# Patient Record
Sex: FEMALE | Race: WHITE | NOT HISPANIC OR LATINO
[De-identification: names, ages, dates, MRNs, and addresses within clinical notes are randomized per-mention and may not be internally consistent; named-entity substitution may affect disease eponyms.]

---

## 2016-02-01 RX ORDER — METOPROLOL TARTRATE 50 MG
1 TABLET ORAL
Qty: 0 | Refills: 0 | DISCHARGE
Start: 2016-02-01

## 2017-03-06 ENCOUNTER — APPOINTMENT (OUTPATIENT)
Dept: DERMATOLOGY | Facility: CLINIC | Age: 82
End: 2017-03-06

## 2017-03-21 ENCOUNTER — APPOINTMENT (OUTPATIENT)
Dept: UROGYNECOLOGY | Facility: CLINIC | Age: 82
End: 2017-03-21

## 2017-03-21 ENCOUNTER — RESULT CHARGE (OUTPATIENT)
Age: 82
End: 2017-03-21

## 2017-03-21 VITALS
SYSTOLIC BLOOD PRESSURE: 173 MMHG | HEIGHT: 60 IN | WEIGHT: 147 LBS | BODY MASS INDEX: 28.86 KG/M2 | DIASTOLIC BLOOD PRESSURE: 72 MMHG

## 2017-06-12 ENCOUNTER — APPOINTMENT (OUTPATIENT)
Dept: UROGYNECOLOGY | Facility: CLINIC | Age: 82
End: 2017-06-12

## 2017-06-12 VITALS
HEIGHT: 62 IN | BODY MASS INDEX: 27.05 KG/M2 | SYSTOLIC BLOOD PRESSURE: 172 MMHG | WEIGHT: 147 LBS | DIASTOLIC BLOOD PRESSURE: 71 MMHG

## 2017-06-12 LAB
BILIRUB UR QL STRIP: NEGATIVE
CLARITY UR: CLEAR
COLLECTION METHOD: NORMAL
GLUCOSE UR-MCNC: NEGATIVE
HCG UR QL: 1 EU/DL
HGB UR QL STRIP.AUTO: NORMAL
KETONES UR-MCNC: NEGATIVE
LEUKOCYTE ESTERASE UR QL STRIP: NORMAL
NITRITE UR QL STRIP: NEGATIVE
PH UR STRIP: 6
PROT UR STRIP-MCNC: NEGATIVE
SP GR UR STRIP: 1.01

## 2017-09-06 ENCOUNTER — APPOINTMENT (OUTPATIENT)
Dept: DERMATOLOGY | Facility: CLINIC | Age: 82
End: 2017-09-06
Payer: MEDICARE

## 2017-09-06 PROCEDURE — 17003 DESTRUCT PREMALG LES 2-14: CPT

## 2017-09-06 PROCEDURE — 17000 DESTRUCT PREMALG LESION: CPT

## 2017-09-06 PROCEDURE — 99214 OFFICE O/P EST MOD 30 MIN: CPT | Mod: 25

## 2017-09-12 ENCOUNTER — APPOINTMENT (OUTPATIENT)
Dept: UROGYNECOLOGY | Facility: CLINIC | Age: 82
End: 2017-09-12
Payer: MEDICARE

## 2017-09-12 LAB
BILIRUB UR QL STRIP: NORMAL
CLARITY UR: CLEAR
COLLECTION METHOD: NORMAL
GLUCOSE UR-MCNC: NEGATIVE
HCG UR QL: 0.2 EU/DL
HGB UR QL STRIP.AUTO: NORMAL
KETONES UR-MCNC: NEGATIVE
LEUKOCYTE ESTERASE UR QL STRIP: NORMAL
NITRITE UR QL STRIP: NEGATIVE
PH UR STRIP: 6
PROT UR STRIP-MCNC: NEGATIVE
SP GR UR STRIP: 1.01

## 2017-09-12 PROCEDURE — 51798 US URINE CAPACITY MEASURE: CPT

## 2017-09-12 PROCEDURE — 81003 URINALYSIS AUTO W/O SCOPE: CPT | Mod: QW

## 2017-09-12 PROCEDURE — 99213 OFFICE O/P EST LOW 20 MIN: CPT

## 2017-09-19 ENCOUNTER — APPOINTMENT (OUTPATIENT)
Dept: CARDIOTHORACIC SURGERY | Facility: CLINIC | Age: 82
End: 2017-09-19

## 2017-12-12 ENCOUNTER — APPOINTMENT (OUTPATIENT)
Dept: UROGYNECOLOGY | Facility: CLINIC | Age: 82
End: 2017-12-12

## 2017-12-21 ENCOUNTER — APPOINTMENT (OUTPATIENT)
Dept: CARDIOLOGY | Facility: CLINIC | Age: 82
End: 2017-12-21

## 2018-02-28 ENCOUNTER — APPOINTMENT (OUTPATIENT)
Dept: UROGYNECOLOGY | Facility: CLINIC | Age: 83
End: 2018-02-28
Payer: COMMERCIAL

## 2018-02-28 VITALS
WEIGHT: 147 LBS | SYSTOLIC BLOOD PRESSURE: 137 MMHG | HEIGHT: 62 IN | BODY MASS INDEX: 27.05 KG/M2 | DIASTOLIC BLOOD PRESSURE: 77 MMHG

## 2018-02-28 PROCEDURE — 51798 US URINE CAPACITY MEASURE: CPT

## 2018-02-28 PROCEDURE — 99213 OFFICE O/P EST LOW 20 MIN: CPT

## 2018-03-06 ENCOUNTER — APPOINTMENT (OUTPATIENT)
Dept: DERMATOLOGY | Facility: CLINIC | Age: 83
End: 2018-03-06

## 2018-03-29 ENCOUNTER — APPOINTMENT (OUTPATIENT)
Dept: CARDIOLOGY | Facility: CLINIC | Age: 83
End: 2018-03-29
Payer: MEDICARE

## 2018-03-29 PROCEDURE — 93280 PM DEVICE PROGR EVAL DUAL: CPT

## 2018-03-30 ENCOUNTER — APPOINTMENT (OUTPATIENT)
Dept: CARDIOLOGY | Facility: CLINIC | Age: 83
End: 2018-03-30
Payer: MEDICARE

## 2018-03-30 VITALS
HEART RATE: 60 BPM | WEIGHT: 119 LBS | BODY MASS INDEX: 23.36 KG/M2 | HEIGHT: 60 IN | RESPIRATION RATE: 16 BRPM | DIASTOLIC BLOOD PRESSURE: 64 MMHG | SYSTOLIC BLOOD PRESSURE: 106 MMHG

## 2018-03-30 PROCEDURE — 99214 OFFICE O/P EST MOD 30 MIN: CPT

## 2018-03-30 PROCEDURE — 93000 ELECTROCARDIOGRAM COMPLETE: CPT

## 2018-03-30 RX ORDER — NYSTATIN 100000 1/G
100000 POWDER TOPICAL
Qty: 60 | Refills: 0 | Status: COMPLETED | COMMUNITY
Start: 2018-03-04

## 2018-03-30 RX ORDER — AMOXICILLIN 250 MG/1
250 CAPSULE ORAL
Qty: 30 | Refills: 0 | Status: COMPLETED | COMMUNITY
Start: 2017-10-30

## 2018-03-30 RX ORDER — ALBUTEROL SULFATE 90 UG/1
108 (90 BASE) AEROSOL, METERED RESPIRATORY (INHALATION)
Qty: 85 | Refills: 0 | Status: COMPLETED | COMMUNITY
Start: 2018-03-04

## 2018-03-30 RX ORDER — LEVETIRACETAM 100 MG/ML
100 SOLUTION ORAL
Qty: 300 | Refills: 0 | Status: COMPLETED | COMMUNITY
Start: 2018-03-04

## 2018-05-21 ENCOUNTER — APPOINTMENT (OUTPATIENT)
Dept: ELECTROPHYSIOLOGY | Facility: CLINIC | Age: 83
End: 2018-05-21

## 2018-05-23 ENCOUNTER — APPOINTMENT (OUTPATIENT)
Dept: UROGYNECOLOGY | Facility: CLINIC | Age: 83
End: 2018-05-23

## 2018-06-07 ENCOUNTER — RECORD ABSTRACTING (OUTPATIENT)
Age: 83
End: 2018-06-07

## 2018-06-07 RX ORDER — AZITHROMYCIN 250 MG/1
250 TABLET, FILM COATED ORAL
Qty: 6 | Refills: 0 | Status: COMPLETED | COMMUNITY
Start: 2018-04-03

## 2018-06-07 RX ORDER — LEVETIRACETAM 500 MG/1
500 TABLET, FILM COATED ORAL
Qty: 60 | Refills: 0 | Status: COMPLETED | COMMUNITY
Start: 2018-04-26

## 2018-06-07 RX ORDER — LOPERAMIDE HYDROCHLORIDE 2 MG/1
2 CAPSULE ORAL
Qty: 2 | Refills: 0 | Status: COMPLETED | COMMUNITY
Start: 2018-04-08

## 2018-06-07 RX ORDER — ATORVASTATIN CALCIUM 20 MG/1
20 TABLET, FILM COATED ORAL
Qty: 90 | Refills: 0 | Status: COMPLETED | COMMUNITY
Start: 2018-05-21

## 2018-06-07 RX ORDER — MIRTAZAPINE 7.5 MG/1
7.5 TABLET, FILM COATED ORAL
Qty: 30 | Refills: 0 | Status: COMPLETED | COMMUNITY
Start: 2018-04-27

## 2018-06-07 RX ORDER — ONDANSETRON 8 MG/1
8 TABLET ORAL
Qty: 3 | Refills: 0 | Status: COMPLETED | COMMUNITY
Start: 2018-04-08

## 2018-06-08 ENCOUNTER — APPOINTMENT (OUTPATIENT)
Dept: CARDIOLOGY | Facility: CLINIC | Age: 83
End: 2018-06-08
Payer: MEDICARE

## 2018-06-08 ENCOUNTER — APPOINTMENT (OUTPATIENT)
Dept: DERMATOLOGY | Facility: CLINIC | Age: 83
End: 2018-06-08

## 2018-06-08 VITALS
HEART RATE: 78 BPM | DIASTOLIC BLOOD PRESSURE: 82 MMHG | BODY MASS INDEX: 23.56 KG/M2 | HEIGHT: 60 IN | WEIGHT: 120 LBS | RESPIRATION RATE: 16 BRPM | SYSTOLIC BLOOD PRESSURE: 149 MMHG

## 2018-06-08 PROCEDURE — 93000 ELECTROCARDIOGRAM COMPLETE: CPT

## 2018-06-08 PROCEDURE — 99214 OFFICE O/P EST MOD 30 MIN: CPT

## 2018-09-28 ENCOUNTER — APPOINTMENT (OUTPATIENT)
Dept: CARDIOLOGY | Facility: CLINIC | Age: 83
End: 2018-09-28
Payer: MEDICARE

## 2018-09-28 VITALS
BODY MASS INDEX: 25.13 KG/M2 | DIASTOLIC BLOOD PRESSURE: 74 MMHG | RESPIRATION RATE: 16 BRPM | SYSTOLIC BLOOD PRESSURE: 120 MMHG | HEART RATE: 60 BPM | WEIGHT: 128 LBS | HEIGHT: 60 IN

## 2018-09-28 DIAGNOSIS — Z86.39 PERSONAL HISTORY OF OTHER ENDOCRINE, NUTRITIONAL AND METABOLIC DISEASE: ICD-10-CM

## 2018-09-28 DIAGNOSIS — Z86.79 PERSONAL HISTORY OF OTHER DISEASES OF THE CIRCULATORY SYSTEM: ICD-10-CM

## 2018-09-28 PROCEDURE — 99214 OFFICE O/P EST MOD 30 MIN: CPT

## 2018-09-28 PROCEDURE — 93280 PM DEVICE PROGR EVAL DUAL: CPT

## 2018-09-28 PROCEDURE — 93000 ELECTROCARDIOGRAM COMPLETE: CPT

## 2018-09-28 RX ORDER — MIRTAZAPINE 15 MG/1
15 TABLET, FILM COATED ORAL
Refills: 0 | Status: COMPLETED | COMMUNITY

## 2018-09-28 RX ORDER — LEVOTHYROXINE SODIUM 0.17 MG/1
TABLET ORAL DAILY
Refills: 0 | Status: DISCONTINUED | COMMUNITY
Start: 2017-04-17 | End: 2018-09-28

## 2018-09-28 RX ORDER — CHROMIUM 200 MCG
1000 TABLET ORAL DAILY
Refills: 0 | Status: ACTIVE | COMMUNITY

## 2018-09-28 RX ORDER — ASPIRIN 81 MG
81 TABLET, DELAYED RELEASE (ENTERIC COATED) ORAL DAILY
Refills: 3 | Status: ACTIVE | COMMUNITY

## 2018-09-28 RX ORDER — LEVETIRACETAM 100 MG/ML
100 SOLUTION ORAL
Refills: 0 | Status: COMPLETED | COMMUNITY

## 2018-10-05 ENCOUNTER — OTHER (OUTPATIENT)
Age: 83
End: 2018-10-05

## 2018-10-05 ENCOUNTER — APPOINTMENT (OUTPATIENT)
Dept: UROGYNECOLOGY | Facility: CLINIC | Age: 83
End: 2018-10-05

## 2018-10-08 ENCOUNTER — APPOINTMENT (OUTPATIENT)
Dept: UROGYNECOLOGY | Facility: CLINIC | Age: 83
End: 2018-10-08
Payer: MEDICARE

## 2018-10-08 ENCOUNTER — RESULT REVIEW (OUTPATIENT)
Age: 83
End: 2018-10-08

## 2018-10-08 VITALS
HEIGHT: 60 IN | DIASTOLIC BLOOD PRESSURE: 78 MMHG | BODY MASS INDEX: 25.13 KG/M2 | SYSTOLIC BLOOD PRESSURE: 148 MMHG | WEIGHT: 128 LBS

## 2018-10-08 LAB — BACTERIA UR CULT: NORMAL

## 2018-10-08 PROCEDURE — 99213 OFFICE O/P EST LOW 20 MIN: CPT | Mod: 25

## 2018-10-08 PROCEDURE — 51701 INSERT BLADDER CATHETER: CPT

## 2018-10-09 ENCOUNTER — RESULT REVIEW (OUTPATIENT)
Age: 83
End: 2018-10-09

## 2018-10-09 LAB
APPEARANCE: CLEAR
BACTERIA: NEGATIVE
BILIRUBIN URINE: NEGATIVE
BLOOD URINE: NEGATIVE
COLOR: YELLOW
GLUCOSE QUALITATIVE U: NEGATIVE MG/DL
HYALINE CASTS: 1 /LPF
KETONES URINE: NEGATIVE
LEUKOCYTE ESTERASE URINE: NEGATIVE
MICROSCOPIC-UA: NORMAL
NITRITE URINE: NEGATIVE
PH URINE: 5.5
PROTEIN URINE: NEGATIVE MG/DL
RED BLOOD CELLS URINE: 2 /HPF
SPECIFIC GRAVITY URINE: 1.02
SQUAMOUS EPITHELIAL CELLS: 0 /HPF
UROBILINOGEN URINE: NEGATIVE MG/DL
WHITE BLOOD CELLS URINE: 0 /HPF

## 2018-10-10 ENCOUNTER — RESULT REVIEW (OUTPATIENT)
Age: 83
End: 2018-10-10

## 2018-10-10 LAB — BACTERIA UR CULT: NORMAL

## 2018-10-18 LAB
APPEARANCE: CLEAR
BACTERIA: NEGATIVE
BILIRUBIN URINE: NEGATIVE
BLOOD URINE: ABNORMAL
COLOR: YELLOW
GLUCOSE QUALITATIVE U: NEGATIVE MG/DL
HYALINE CASTS: 0 /LPF
KETONES URINE: NEGATIVE
LEUKOCYTE ESTERASE URINE: ABNORMAL
MICROSCOPIC-UA: NORMAL
NITRITE URINE: NEGATIVE
PH URINE: 7.5
PROTEIN URINE: NEGATIVE MG/DL
RED BLOOD CELLS URINE: 3 /HPF
SPECIFIC GRAVITY URINE: 1.01
SQUAMOUS EPITHELIAL CELLS: 1 /HPF
UROBILINOGEN URINE: NEGATIVE MG/DL
WHITE BLOOD CELLS URINE: 133 /HPF

## 2018-12-03 ENCOUNTER — RX RENEWAL (OUTPATIENT)
Age: 83
End: 2018-12-03

## 2019-01-01 ENCOUNTER — RESULT REVIEW (OUTPATIENT)
Age: 84
End: 2019-01-01

## 2019-01-01 ENCOUNTER — RX RENEWAL (OUTPATIENT)
Age: 84
End: 2019-01-01

## 2019-01-01 ENCOUNTER — APPOINTMENT (OUTPATIENT)
Dept: UROGYNECOLOGY | Facility: CLINIC | Age: 84
End: 2019-01-01

## 2019-01-01 ENCOUNTER — APPOINTMENT (OUTPATIENT)
Dept: CARDIOLOGY | Facility: CLINIC | Age: 84
End: 2019-01-01
Payer: MEDICARE

## 2019-01-01 ENCOUNTER — APPOINTMENT (OUTPATIENT)
Dept: UROGYNECOLOGY | Facility: CLINIC | Age: 84
End: 2019-01-01
Payer: MEDICARE

## 2019-01-01 ENCOUNTER — APPOINTMENT (OUTPATIENT)
Dept: DERMATOLOGY | Facility: CLINIC | Age: 84
End: 2019-01-01
Payer: MEDICARE

## 2019-01-01 ENCOUNTER — NON-APPOINTMENT (OUTPATIENT)
Age: 84
End: 2019-01-01

## 2019-01-01 ENCOUNTER — INPATIENT (INPATIENT)
Facility: HOSPITAL | Age: 84
LOS: 13 days | Discharge: HOME | DRG: 291 | End: 2019-12-24
Attending: INTERNAL MEDICINE | Admitting: HOSPITALIST
Payer: MEDICARE

## 2019-01-01 ENCOUNTER — TRANSCRIPTION ENCOUNTER (OUTPATIENT)
Age: 84
End: 2019-01-01

## 2019-01-01 ENCOUNTER — APPOINTMENT (OUTPATIENT)
Dept: CARDIOLOGY | Facility: CLINIC | Age: 84
End: 2019-01-01

## 2019-01-01 ENCOUNTER — RESULT CHARGE (OUTPATIENT)
Age: 84
End: 2019-01-01

## 2019-01-01 ENCOUNTER — MEDICATION RENEWAL (OUTPATIENT)
Age: 84
End: 2019-01-01

## 2019-01-01 VITALS
WEIGHT: 150 LBS | RESPIRATION RATE: 12 BRPM | HEIGHT: 60 IN | SYSTOLIC BLOOD PRESSURE: 133 MMHG | HEART RATE: 92 BPM | BODY MASS INDEX: 29.45 KG/M2 | DIASTOLIC BLOOD PRESSURE: 80 MMHG

## 2019-01-01 VITALS
BODY MASS INDEX: 30.43 KG/M2 | HEIGHT: 60 IN | WEIGHT: 155 LBS | DIASTOLIC BLOOD PRESSURE: 75 MMHG | SYSTOLIC BLOOD PRESSURE: 142 MMHG

## 2019-01-01 VITALS
DIASTOLIC BLOOD PRESSURE: 85 MMHG | SYSTOLIC BLOOD PRESSURE: 118 MMHG | TEMPERATURE: 98 F | HEART RATE: 89 BPM | RESPIRATION RATE: 18 BRPM | OXYGEN SATURATION: 93 %

## 2019-01-01 VITALS
OXYGEN SATURATION: 94 % | RESPIRATION RATE: 16 BRPM | TEMPERATURE: 98 F | HEART RATE: 80 BPM | DIASTOLIC BLOOD PRESSURE: 68 MMHG | SYSTOLIC BLOOD PRESSURE: 120 MMHG

## 2019-01-01 VITALS
WEIGHT: 144 LBS | HEIGHT: 60 IN | DIASTOLIC BLOOD PRESSURE: 75 MMHG | SYSTOLIC BLOOD PRESSURE: 146 MMHG | BODY MASS INDEX: 28.27 KG/M2

## 2019-01-01 DIAGNOSIS — Z95.0 PRESENCE OF CARDIAC PACEMAKER: Chronic | ICD-10-CM

## 2019-01-01 DIAGNOSIS — R53.82 CHRONIC FATIGUE, UNSPECIFIED: ICD-10-CM

## 2019-01-01 DIAGNOSIS — E78.5 HYPERLIPIDEMIA, UNSPECIFIED: ICD-10-CM

## 2019-01-01 DIAGNOSIS — N39.0 URINARY TRACT INFECTION, SITE NOT SPECIFIED: ICD-10-CM

## 2019-01-01 DIAGNOSIS — I25.10 ATHEROSCLEROTIC HEART DISEASE OF NATIVE CORONARY ARTERY W/OUT ANGINA PECTORIS: ICD-10-CM

## 2019-01-01 DIAGNOSIS — I65.29 OCCLUSION AND STENOSIS OF UNSPECIFIED CAROTID ARTERY: ICD-10-CM

## 2019-01-01 DIAGNOSIS — I50.9 HEART FAILURE, UNSPECIFIED: ICD-10-CM

## 2019-01-01 DIAGNOSIS — I48.91 UNSPECIFIED ATRIAL FIBRILLATION: ICD-10-CM

## 2019-01-01 DIAGNOSIS — R39.9 UNSPECIFIED SYMPTOMS AND SIGNS INVOLVING THE GENITOURINARY SYSTEM: ICD-10-CM

## 2019-01-01 DIAGNOSIS — I35.0 NONRHEUMATIC AORTIC (VALVE) STENOSIS: ICD-10-CM

## 2019-01-01 DIAGNOSIS — H26.9 UNSPECIFIED CATARACT: Chronic | ICD-10-CM

## 2019-01-01 DIAGNOSIS — Z29.9 ENCOUNTER FOR PROPHYLACTIC MEASURES, UNSPECIFIED: ICD-10-CM

## 2019-01-01 DIAGNOSIS — Z95.2 PRESENCE OF PROSTHETIC HEART VALVE: Chronic | ICD-10-CM

## 2019-01-01 LAB
-  AMPICILLIN/SULBACTAM: SIGNIFICANT CHANGE UP
-  CEFAZOLIN: SIGNIFICANT CHANGE UP
-  DAPTOMYCIN: SIGNIFICANT CHANGE UP
-  GENTAMICIN: SIGNIFICANT CHANGE UP
-  LINEZOLID: SIGNIFICANT CHANGE UP
-  OXACILLIN: SIGNIFICANT CHANGE UP
-  PENICILLIN: SIGNIFICANT CHANGE UP
-  RIFAMPIN: SIGNIFICANT CHANGE UP
-  TETRACYCLINE: SIGNIFICANT CHANGE UP
-  TRIMETHOPRIM/SULFAMETHOXAZOLE: SIGNIFICANT CHANGE UP
-  VANCOMYCIN: SIGNIFICANT CHANGE UP
ALBUMIN SERPL ELPH-MCNC: 2.9 G/DL — LOW (ref 3.3–5.2)
ALBUMIN SERPL ELPH-MCNC: 3 G/DL — LOW (ref 3.3–5.2)
ALP SERPL-CCNC: 85 U/L — SIGNIFICANT CHANGE UP (ref 40–120)
ALP SERPL-CCNC: 90 U/L — SIGNIFICANT CHANGE UP (ref 40–120)
ALT FLD-CCNC: 20 U/L — SIGNIFICANT CHANGE UP
ALT FLD-CCNC: 25 U/L — SIGNIFICANT CHANGE UP
ANION GAP SERPL CALC-SCNC: 10 MMOL/L — SIGNIFICANT CHANGE UP (ref 5–17)
ANION GAP SERPL CALC-SCNC: 12 MMOL/L — SIGNIFICANT CHANGE UP (ref 5–17)
ANION GAP SERPL CALC-SCNC: 13 MMOL/L — SIGNIFICANT CHANGE UP (ref 5–17)
ANION GAP SERPL CALC-SCNC: 14 MMOL/L — SIGNIFICANT CHANGE UP (ref 5–17)
ANION GAP SERPL CALC-SCNC: 15 MMOL/L — SIGNIFICANT CHANGE UP (ref 5–17)
ANION GAP SERPL CALC-SCNC: 17 MMOL/L — SIGNIFICANT CHANGE UP (ref 5–17)
ANION GAP SERPL CALC-SCNC: 17 MMOL/L — SIGNIFICANT CHANGE UP (ref 5–17)
ANION GAP SERPL CALC-SCNC: 18 MMOL/L — HIGH (ref 5–17)
ANION GAP SERPL CALC-SCNC: 18 MMOL/L — HIGH (ref 5–17)
APPEARANCE UR: ABNORMAL
APPEARANCE: CLEAR
APPEARANCE: CLEAR
APTT BLD: 29 SEC — SIGNIFICANT CHANGE UP (ref 27.5–36.3)
APTT BLD: 30.2 SEC — SIGNIFICANT CHANGE UP (ref 27.5–36.3)
AST SERPL-CCNC: 30 U/L — SIGNIFICANT CHANGE UP
AST SERPL-CCNC: 41 U/L — HIGH
BACTERIA # UR AUTO: ABNORMAL
BACTERIA UR CULT: NORMAL
BACTERIA UR CULT: NORMAL
BACTERIA: NEGATIVE
BACTERIA: NEGATIVE
BASOPHILS # BLD AUTO: 0.01 K/UL — SIGNIFICANT CHANGE UP (ref 0–0.2)
BASOPHILS # BLD AUTO: 0.03 K/UL — SIGNIFICANT CHANGE UP (ref 0–0.2)
BASOPHILS # BLD AUTO: 0.04 K/UL — SIGNIFICANT CHANGE UP (ref 0–0.2)
BASOPHILS # BLD AUTO: 0.05 K/UL — SIGNIFICANT CHANGE UP (ref 0–0.2)
BASOPHILS # BLD AUTO: 0.06 K/UL — SIGNIFICANT CHANGE UP (ref 0–0.2)
BASOPHILS # BLD AUTO: 0.2 K/UL — SIGNIFICANT CHANGE UP (ref 0–0.2)
BASOPHILS NFR BLD AUTO: 0.1 % — SIGNIFICANT CHANGE UP (ref 0–2)
BASOPHILS NFR BLD AUTO: 0.3 % — SIGNIFICANT CHANGE UP (ref 0–2)
BASOPHILS NFR BLD AUTO: 0.4 % — SIGNIFICANT CHANGE UP (ref 0–2)
BASOPHILS NFR BLD AUTO: 0.5 % — SIGNIFICANT CHANGE UP (ref 0–2)
BASOPHILS NFR BLD AUTO: 0.6 % — SIGNIFICANT CHANGE UP (ref 0–2)
BASOPHILS NFR BLD AUTO: 1.7 % — SIGNIFICANT CHANGE UP (ref 0–2)
BILIRUB SERPL-MCNC: 0.7 MG/DL — SIGNIFICANT CHANGE UP (ref 0.4–2)
BILIRUB SERPL-MCNC: 0.8 MG/DL — SIGNIFICANT CHANGE UP (ref 0.4–2)
BILIRUB UR QL STRIP: NEGATIVE
BILIRUB UR-MCNC: NEGATIVE — SIGNIFICANT CHANGE UP
BILIRUBIN URINE: NEGATIVE
BILIRUBIN URINE: NEGATIVE
BLOOD URINE: ABNORMAL
BLOOD URINE: NEGATIVE
BUN SERPL-MCNC: 16 MG/DL — SIGNIFICANT CHANGE UP (ref 8–20)
BUN SERPL-MCNC: 17 MG/DL — SIGNIFICANT CHANGE UP (ref 8–20)
BUN SERPL-MCNC: 22 MG/DL — HIGH (ref 8–20)
BUN SERPL-MCNC: 23 MG/DL — HIGH (ref 8–20)
BUN SERPL-MCNC: 24 MG/DL — HIGH (ref 8–20)
BUN SERPL-MCNC: 26 MG/DL — HIGH (ref 8–20)
BUN SERPL-MCNC: 27 MG/DL — HIGH (ref 8–20)
BUN SERPL-MCNC: 37 MG/DL — HIGH (ref 8–20)
CALCIUM SERPL-MCNC: 8.8 MG/DL — SIGNIFICANT CHANGE UP (ref 8.6–10.2)
CALCIUM SERPL-MCNC: 8.9 MG/DL — SIGNIFICANT CHANGE UP (ref 8.6–10.2)
CALCIUM SERPL-MCNC: 9 MG/DL — SIGNIFICANT CHANGE UP (ref 8.6–10.2)
CALCIUM SERPL-MCNC: 9.1 MG/DL — SIGNIFICANT CHANGE UP (ref 8.6–10.2)
CALCIUM SERPL-MCNC: 9.2 MG/DL — SIGNIFICANT CHANGE UP (ref 8.6–10.2)
CALCIUM SERPL-MCNC: 9.2 MG/DL — SIGNIFICANT CHANGE UP (ref 8.6–10.2)
CALCIUM SERPL-MCNC: 9.3 MG/DL — SIGNIFICANT CHANGE UP (ref 8.6–10.2)
CALCIUM SERPL-MCNC: 9.4 MG/DL — SIGNIFICANT CHANGE UP (ref 8.6–10.2)
CALCIUM SERPL-MCNC: 9.4 MG/DL — SIGNIFICANT CHANGE UP (ref 8.6–10.2)
CHLORIDE SERPL-SCNC: 89 MMOL/L — LOW (ref 98–107)
CHLORIDE SERPL-SCNC: 90 MMOL/L — LOW (ref 98–107)
CHLORIDE SERPL-SCNC: 91 MMOL/L — LOW (ref 98–107)
CHLORIDE SERPL-SCNC: 91 MMOL/L — LOW (ref 98–107)
CHLORIDE SERPL-SCNC: 92 MMOL/L — LOW (ref 98–107)
CHLORIDE SERPL-SCNC: 92 MMOL/L — LOW (ref 98–107)
CHLORIDE SERPL-SCNC: 95 MMOL/L — LOW (ref 98–107)
CHLORIDE SERPL-SCNC: 95 MMOL/L — LOW (ref 98–107)
CHLORIDE SERPL-SCNC: 96 MMOL/L — LOW (ref 98–107)
CHLORIDE SERPL-SCNC: 98 MMOL/L — SIGNIFICANT CHANGE UP (ref 98–107)
CLARITY UR: CLEAR
CO2 SERPL-SCNC: 23 MMOL/L — SIGNIFICANT CHANGE UP (ref 22–29)
CO2 SERPL-SCNC: 26 MMOL/L — SIGNIFICANT CHANGE UP (ref 22–29)
CO2 SERPL-SCNC: 27 MMOL/L — SIGNIFICANT CHANGE UP (ref 22–29)
CO2 SERPL-SCNC: 28 MMOL/L — SIGNIFICANT CHANGE UP (ref 22–29)
CO2 SERPL-SCNC: 29 MMOL/L — SIGNIFICANT CHANGE UP (ref 22–29)
CO2 SERPL-SCNC: 30 MMOL/L — HIGH (ref 22–29)
CO2 SERPL-SCNC: 31 MMOL/L — HIGH (ref 22–29)
CO2 SERPL-SCNC: 33 MMOL/L — HIGH (ref 22–29)
COLLECTION METHOD: NORMAL
COLOR SPEC: YELLOW — SIGNIFICANT CHANGE UP
COLOR: YELLOW
COLOR: YELLOW
CREAT SERPL-MCNC: 0.76 MG/DL — SIGNIFICANT CHANGE UP (ref 0.5–1.3)
CREAT SERPL-MCNC: 0.78 MG/DL — SIGNIFICANT CHANGE UP (ref 0.5–1.3)
CREAT SERPL-MCNC: 0.82 MG/DL — SIGNIFICANT CHANGE UP (ref 0.5–1.3)
CREAT SERPL-MCNC: 0.84 MG/DL — SIGNIFICANT CHANGE UP (ref 0.5–1.3)
CREAT SERPL-MCNC: 0.88 MG/DL — SIGNIFICANT CHANGE UP (ref 0.5–1.3)
CREAT SERPL-MCNC: 0.89 MG/DL — SIGNIFICANT CHANGE UP (ref 0.5–1.3)
CREAT SERPL-MCNC: 0.91 MG/DL — SIGNIFICANT CHANGE UP (ref 0.5–1.3)
CREAT SERPL-MCNC: 0.96 MG/DL — SIGNIFICANT CHANGE UP (ref 0.5–1.3)
CREAT SERPL-MCNC: 0.98 MG/DL — SIGNIFICANT CHANGE UP (ref 0.5–1.3)
CREAT SERPL-MCNC: 1.03 MG/DL — SIGNIFICANT CHANGE UP (ref 0.5–1.3)
CULTURE RESULTS: SIGNIFICANT CHANGE UP
DIFF PNL FLD: ABNORMAL
DIGOXIN SERPL-MCNC: 1.8 NG/ML — SIGNIFICANT CHANGE UP (ref 0.8–2)
EOSINOPHIL # BLD AUTO: 0 K/UL — SIGNIFICANT CHANGE UP (ref 0–0.5)
EOSINOPHIL # BLD AUTO: 0.01 K/UL — SIGNIFICANT CHANGE UP (ref 0–0.5)
EOSINOPHIL # BLD AUTO: 0.16 K/UL — SIGNIFICANT CHANGE UP (ref 0–0.5)
EOSINOPHIL # BLD AUTO: 0.2 K/UL — SIGNIFICANT CHANGE UP (ref 0–0.5)
EOSINOPHIL # BLD AUTO: 0.32 K/UL — SIGNIFICANT CHANGE UP (ref 0–0.5)
EOSINOPHIL # BLD AUTO: 0.6 K/UL — HIGH (ref 0–0.5)
EOSINOPHIL NFR BLD AUTO: 0 % — SIGNIFICANT CHANGE UP (ref 0–6)
EOSINOPHIL NFR BLD AUTO: 0.1 % — SIGNIFICANT CHANGE UP (ref 0–6)
EOSINOPHIL NFR BLD AUTO: 1.6 % — SIGNIFICANT CHANGE UP (ref 0–6)
EOSINOPHIL NFR BLD AUTO: 1.8 % — SIGNIFICANT CHANGE UP (ref 0–6)
EOSINOPHIL NFR BLD AUTO: 3.4 % — SIGNIFICANT CHANGE UP (ref 0–6)
EOSINOPHIL NFR BLD AUTO: 5.2 % — SIGNIFICANT CHANGE UP (ref 0–6)
EPI CELLS # UR: SIGNIFICANT CHANGE UP
GIANT PLATELETS BLD QL SMEAR: PRESENT — SIGNIFICANT CHANGE UP
GLUCOSE QUALITATIVE U: NEGATIVE
GLUCOSE QUALITATIVE U: NEGATIVE
GLUCOSE SERPL-MCNC: 110 MG/DL — SIGNIFICANT CHANGE UP (ref 70–115)
GLUCOSE SERPL-MCNC: 121 MG/DL — HIGH (ref 70–115)
GLUCOSE SERPL-MCNC: 156 MG/DL — HIGH (ref 70–115)
GLUCOSE SERPL-MCNC: 81 MG/DL — SIGNIFICANT CHANGE UP (ref 70–115)
GLUCOSE SERPL-MCNC: 86 MG/DL — SIGNIFICANT CHANGE UP (ref 70–115)
GLUCOSE SERPL-MCNC: 90 MG/DL — SIGNIFICANT CHANGE UP (ref 70–115)
GLUCOSE SERPL-MCNC: 93 MG/DL — SIGNIFICANT CHANGE UP (ref 70–115)
GLUCOSE SERPL-MCNC: 95 MG/DL — SIGNIFICANT CHANGE UP (ref 70–115)
GLUCOSE SERPL-MCNC: 96 MG/DL — SIGNIFICANT CHANGE UP (ref 70–115)
GLUCOSE SERPL-MCNC: 97 MG/DL — SIGNIFICANT CHANGE UP (ref 70–115)
GLUCOSE UR QL: NEGATIVE — SIGNIFICANT CHANGE UP
GLUCOSE UR-MCNC: NEGATIVE
HCG UR QL: 1 EU/DL
HCT VFR BLD CALC: 42.2 % — SIGNIFICANT CHANGE UP (ref 34.5–45)
HCT VFR BLD CALC: 42.3 % — SIGNIFICANT CHANGE UP (ref 34.5–45)
HCT VFR BLD CALC: 42.5 % — SIGNIFICANT CHANGE UP (ref 34.5–45)
HCT VFR BLD CALC: 43.1 % — SIGNIFICANT CHANGE UP (ref 34.5–45)
HCT VFR BLD CALC: 45.9 % — HIGH (ref 34.5–45)
HCT VFR BLD CALC: 46.6 % — HIGH (ref 34.5–45)
HCT VFR BLD CALC: 48.4 % — HIGH (ref 34.5–45)
HCT VFR BLD CALC: 48.8 % — HIGH (ref 34.5–45)
HCT VFR BLD CALC: 51 % — HIGH (ref 34.5–45)
HGB BLD-MCNC: 13.6 G/DL — SIGNIFICANT CHANGE UP (ref 11.5–15.5)
HGB BLD-MCNC: 13.7 G/DL — SIGNIFICANT CHANGE UP (ref 11.5–15.5)
HGB BLD-MCNC: 13.7 G/DL — SIGNIFICANT CHANGE UP (ref 11.5–15.5)
HGB BLD-MCNC: 13.9 G/DL — SIGNIFICANT CHANGE UP (ref 11.5–15.5)
HGB BLD-MCNC: 14.9 G/DL — SIGNIFICANT CHANGE UP (ref 11.5–15.5)
HGB BLD-MCNC: 15.1 G/DL — SIGNIFICANT CHANGE UP (ref 11.5–15.5)
HGB BLD-MCNC: 15.5 G/DL — SIGNIFICANT CHANGE UP (ref 11.5–15.5)
HGB BLD-MCNC: 15.7 G/DL — HIGH (ref 11.5–15.5)
HGB BLD-MCNC: 16.5 G/DL — HIGH (ref 11.5–15.5)
HGB UR QL STRIP.AUTO: 1
HYALINE CASTS: 0 /LPF
HYALINE CASTS: 2 /LPF
IMM GRANULOCYTES NFR BLD AUTO: 0.6 % — SIGNIFICANT CHANGE UP (ref 0–1.5)
IMM GRANULOCYTES NFR BLD AUTO: 0.7 % — SIGNIFICANT CHANGE UP (ref 0–1.5)
IMM GRANULOCYTES NFR BLD AUTO: 0.7 % — SIGNIFICANT CHANGE UP (ref 0–1.5)
IMM GRANULOCYTES NFR BLD AUTO: 1.1 % — SIGNIFICANT CHANGE UP (ref 0–1.5)
IMM GRANULOCYTES NFR BLD AUTO: 2.5 % — HIGH (ref 0–1.5)
INR BLD: 1.27 RATIO — HIGH (ref 0.88–1.16)
INR BLD: 1.3 RATIO — HIGH (ref 0.88–1.16)
KETONES UR-MCNC: NEGATIVE
KETONES UR-MCNC: NEGATIVE — SIGNIFICANT CHANGE UP
KETONES URINE: NEGATIVE
KETONES URINE: NEGATIVE
LACTATE BLDV-MCNC: 2.2 MMOL/L — HIGH (ref 0.5–2)
LACTATE SERPL-SCNC: 1.5 MMOL/L — SIGNIFICANT CHANGE UP (ref 0.5–2)
LEUKOCYTE ESTERASE UR QL STRIP: NEGATIVE
LEUKOCYTE ESTERASE UR-ACNC: ABNORMAL
LEUKOCYTE ESTERASE URINE: NEGATIVE
LEUKOCYTE ESTERASE URINE: NEGATIVE
LIDOCAIN IGE QN: 9 U/L — LOW (ref 22–51)
LYMPHOCYTES # BLD AUTO: 0.63 K/UL — LOW (ref 1–3.3)
LYMPHOCYTES # BLD AUTO: 0.9 K/UL — LOW (ref 1–3.3)
LYMPHOCYTES # BLD AUTO: 0.92 K/UL — LOW (ref 1–3.3)
LYMPHOCYTES # BLD AUTO: 1.22 K/UL — SIGNIFICANT CHANGE UP (ref 1–3.3)
LYMPHOCYTES # BLD AUTO: 1.84 K/UL — SIGNIFICANT CHANGE UP (ref 1–3.3)
LYMPHOCYTES # BLD AUTO: 11.2 % — LOW (ref 13–44)
LYMPHOCYTES # BLD AUTO: 19.4 % — SIGNIFICANT CHANGE UP (ref 13–44)
LYMPHOCYTES # BLD AUTO: 2.14 K/UL — SIGNIFICANT CHANGE UP (ref 1–3.3)
LYMPHOCYTES # BLD AUTO: 22.1 % — SIGNIFICANT CHANGE UP (ref 13–44)
LYMPHOCYTES # BLD AUTO: 5.4 % — LOW (ref 13–44)
LYMPHOCYTES # BLD AUTO: 7.7 % — LOW (ref 13–44)
LYMPHOCYTES # BLD AUTO: 7.8 % — LOW (ref 13–44)
MAGNESIUM SERPL-MCNC: 1.7 MG/DL — SIGNIFICANT CHANGE UP (ref 1.6–2.6)
MAGNESIUM SERPL-MCNC: 1.8 MG/DL — SIGNIFICANT CHANGE UP (ref 1.6–2.6)
MAGNESIUM SERPL-MCNC: 1.8 MG/DL — SIGNIFICANT CHANGE UP (ref 1.6–2.6)
MAGNESIUM SERPL-MCNC: 1.9 MG/DL — SIGNIFICANT CHANGE UP (ref 1.6–2.6)
MAGNESIUM SERPL-MCNC: 1.9 MG/DL — SIGNIFICANT CHANGE UP (ref 1.8–2.6)
MAGNESIUM SERPL-MCNC: 2 MG/DL — SIGNIFICANT CHANGE UP (ref 1.8–2.6)
MANUAL SMEAR VERIFICATION: SIGNIFICANT CHANGE UP
MCHC RBC-ENTMCNC: 28.9 PG — SIGNIFICANT CHANGE UP (ref 27–34)
MCHC RBC-ENTMCNC: 29.1 PG — SIGNIFICANT CHANGE UP (ref 27–34)
MCHC RBC-ENTMCNC: 29.4 PG — SIGNIFICANT CHANGE UP (ref 27–34)
MCHC RBC-ENTMCNC: 29.5 PG — SIGNIFICANT CHANGE UP (ref 27–34)
MCHC RBC-ENTMCNC: 29.7 PG — SIGNIFICANT CHANGE UP (ref 27–34)
MCHC RBC-ENTMCNC: 29.9 PG — SIGNIFICANT CHANGE UP (ref 27–34)
MCHC RBC-ENTMCNC: 32 GM/DL — SIGNIFICANT CHANGE UP (ref 32–36)
MCHC RBC-ENTMCNC: 32 GM/DL — SIGNIFICANT CHANGE UP (ref 32–36)
MCHC RBC-ENTMCNC: 32.2 GM/DL — SIGNIFICANT CHANGE UP (ref 32–36)
MCHC RBC-ENTMCNC: 32.3 GM/DL — SIGNIFICANT CHANGE UP (ref 32–36)
MCHC RBC-ENTMCNC: 32.4 GM/DL — SIGNIFICANT CHANGE UP (ref 32–36)
MCHC RBC-ENTMCNC: 32.5 GM/DL — SIGNIFICANT CHANGE UP (ref 32–36)
MCHC RBC-ENTMCNC: 32.9 GM/DL — SIGNIFICANT CHANGE UP (ref 32–36)
MCV RBC AUTO: 90.3 FL — SIGNIFICANT CHANGE UP (ref 80–100)
MCV RBC AUTO: 90.5 FL — SIGNIFICANT CHANGE UP (ref 80–100)
MCV RBC AUTO: 90.8 FL — SIGNIFICANT CHANGE UP (ref 80–100)
MCV RBC AUTO: 90.9 FL — SIGNIFICANT CHANGE UP (ref 80–100)
MCV RBC AUTO: 91.2 FL — SIGNIFICANT CHANGE UP (ref 80–100)
MCV RBC AUTO: 91.9 FL — SIGNIFICANT CHANGE UP (ref 80–100)
MCV RBC AUTO: 92.1 FL — SIGNIFICANT CHANGE UP (ref 80–100)
MCV RBC AUTO: 92.4 FL — SIGNIFICANT CHANGE UP (ref 80–100)
MCV RBC AUTO: 92.7 FL — SIGNIFICANT CHANGE UP (ref 80–100)
METAMYELOCYTES # FLD: 0.9 % — HIGH (ref 0–0)
METHOD TYPE: SIGNIFICANT CHANGE UP
MICROSCOPIC-UA: NORMAL
MICROSCOPIC-UA: NORMAL
MONOCYTES # BLD AUTO: 0.27 K/UL — SIGNIFICANT CHANGE UP (ref 0–0.9)
MONOCYTES # BLD AUTO: 0.71 K/UL — SIGNIFICANT CHANGE UP (ref 0–0.9)
MONOCYTES # BLD AUTO: 0.88 K/UL — SIGNIFICANT CHANGE UP (ref 0–0.9)
MONOCYTES # BLD AUTO: 1 K/UL — HIGH (ref 0–0.9)
MONOCYTES # BLD AUTO: 1.17 K/UL — HIGH (ref 0–0.9)
MONOCYTES # BLD AUTO: 1.27 K/UL — HIGH (ref 0–0.9)
MONOCYTES NFR BLD AUTO: 12.1 % — SIGNIFICANT CHANGE UP (ref 2–14)
MONOCYTES NFR BLD AUTO: 13.4 % — SIGNIFICANT CHANGE UP (ref 2–14)
MONOCYTES NFR BLD AUTO: 2.3 % — SIGNIFICANT CHANGE UP (ref 2–14)
MONOCYTES NFR BLD AUTO: 5.9 % — SIGNIFICANT CHANGE UP (ref 2–14)
MONOCYTES NFR BLD AUTO: 8.1 % — SIGNIFICANT CHANGE UP (ref 2–14)
MONOCYTES NFR BLD AUTO: 8.7 % — SIGNIFICANT CHANGE UP (ref 2–14)
MRSA PCR RESULT.: DETECTED
MYELOCYTES NFR BLD: 0.9 % — HIGH (ref 0–0)
NEUTROPHILS # BLD AUTO: 10.22 K/UL — HIGH (ref 1.8–7.4)
NEUTROPHILS # BLD AUTO: 10.63 K/UL — HIGH (ref 1.8–7.4)
NEUTROPHILS # BLD AUTO: 5.75 K/UL — SIGNIFICANT CHANGE UP (ref 1.8–7.4)
NEUTROPHILS # BLD AUTO: 6.07 K/UL — SIGNIFICANT CHANGE UP (ref 1.8–7.4)
NEUTROPHILS # BLD AUTO: 8.09 K/UL — HIGH (ref 1.8–7.4)
NEUTROPHILS # BLD AUTO: 8.47 K/UL — HIGH (ref 1.8–7.4)
NEUTROPHILS NFR BLD AUTO: 60.7 % — SIGNIFICANT CHANGE UP (ref 43–77)
NEUTROPHILS NFR BLD AUTO: 62.6 % — SIGNIFICANT CHANGE UP (ref 43–77)
NEUTROPHILS NFR BLD AUTO: 65.2 % — SIGNIFICANT CHANGE UP (ref 43–77)
NEUTROPHILS NFR BLD AUTO: 77.8 % — HIGH (ref 43–77)
NEUTROPHILS NFR BLD AUTO: 85.4 % — HIGH (ref 43–77)
NEUTROPHILS NFR BLD AUTO: 91.5 % — HIGH (ref 43–77)
NEUTS BAND # BLD: 5.2 % — SIGNIFICANT CHANGE UP (ref 0–8)
NITRITE UR QL STRIP: NEGATIVE
NITRITE UR-MCNC: POSITIVE
NITRITE URINE: NEGATIVE
NITRITE URINE: NEGATIVE
NT-PROBNP SERPL-SCNC: 2169 PG/ML — HIGH (ref 0–300)
NT-PROBNP SERPL-SCNC: 3756 PG/ML — HIGH (ref 0–300)
NT-PROBNP SERPL-SCNC: 6824 PG/ML — HIGH (ref 0–300)
ORGANISM # SPEC MICROSCOPIC CNT: SIGNIFICANT CHANGE UP
ORGANISM # SPEC MICROSCOPIC CNT: SIGNIFICANT CHANGE UP
PH UR STRIP: 5.5
PH UR: 6.5 — SIGNIFICANT CHANGE UP (ref 5–8)
PH URINE: 6
PH URINE: 6
PHOSPHATE SERPL-MCNC: 2.9 MG/DL — SIGNIFICANT CHANGE UP (ref 2.4–4.7)
PHOSPHATE SERPL-MCNC: 3.2 MG/DL — SIGNIFICANT CHANGE UP (ref 2.4–4.7)
PHOSPHATE SERPL-MCNC: 3.3 MG/DL — SIGNIFICANT CHANGE UP (ref 2.4–4.7)
PLAT MORPH BLD: NORMAL — SIGNIFICANT CHANGE UP
PLATELET # BLD AUTO: 261 K/UL — SIGNIFICANT CHANGE UP (ref 150–400)
PLATELET # BLD AUTO: 274 K/UL — SIGNIFICANT CHANGE UP (ref 150–400)
PLATELET # BLD AUTO: 285 K/UL — SIGNIFICANT CHANGE UP (ref 150–400)
PLATELET # BLD AUTO: 318 K/UL — SIGNIFICANT CHANGE UP (ref 150–400)
PLATELET # BLD AUTO: 326 K/UL — SIGNIFICANT CHANGE UP (ref 150–400)
PLATELET # BLD AUTO: 333 K/UL — SIGNIFICANT CHANGE UP (ref 150–400)
PLATELET # BLD AUTO: 334 K/UL — SIGNIFICANT CHANGE UP (ref 150–400)
PLATELET # BLD AUTO: 342 K/UL — SIGNIFICANT CHANGE UP (ref 150–400)
PLATELET # BLD AUTO: 383 K/UL — SIGNIFICANT CHANGE UP (ref 150–400)
POTASSIUM SERPL-MCNC: 3.2 MMOL/L — LOW (ref 3.5–5.3)
POTASSIUM SERPL-MCNC: 3.5 MMOL/L — SIGNIFICANT CHANGE UP (ref 3.5–5.3)
POTASSIUM SERPL-MCNC: 3.6 MMOL/L — SIGNIFICANT CHANGE UP (ref 3.5–5.3)
POTASSIUM SERPL-MCNC: 3.6 MMOL/L — SIGNIFICANT CHANGE UP (ref 3.5–5.3)
POTASSIUM SERPL-MCNC: 3.7 MMOL/L — SIGNIFICANT CHANGE UP (ref 3.5–5.3)
POTASSIUM SERPL-MCNC: 3.9 MMOL/L — SIGNIFICANT CHANGE UP (ref 3.5–5.3)
POTASSIUM SERPL-MCNC: 4 MMOL/L — SIGNIFICANT CHANGE UP (ref 3.5–5.3)
POTASSIUM SERPL-MCNC: 4.1 MMOL/L — SIGNIFICANT CHANGE UP (ref 3.5–5.3)
POTASSIUM SERPL-MCNC: 4.2 MMOL/L — SIGNIFICANT CHANGE UP (ref 3.5–5.3)
POTASSIUM SERPL-MCNC: 4.4 MMOL/L — SIGNIFICANT CHANGE UP (ref 3.5–5.3)
POTASSIUM SERPL-MCNC: 4.5 MMOL/L — SIGNIFICANT CHANGE UP (ref 3.5–5.3)
POTASSIUM SERPL-MCNC: 4.5 MMOL/L — SIGNIFICANT CHANGE UP (ref 3.5–5.3)
POTASSIUM SERPL-SCNC: 3.2 MMOL/L — LOW (ref 3.5–5.3)
POTASSIUM SERPL-SCNC: 3.5 MMOL/L — SIGNIFICANT CHANGE UP (ref 3.5–5.3)
POTASSIUM SERPL-SCNC: 3.6 MMOL/L — SIGNIFICANT CHANGE UP (ref 3.5–5.3)
POTASSIUM SERPL-SCNC: 3.6 MMOL/L — SIGNIFICANT CHANGE UP (ref 3.5–5.3)
POTASSIUM SERPL-SCNC: 3.7 MMOL/L — SIGNIFICANT CHANGE UP (ref 3.5–5.3)
POTASSIUM SERPL-SCNC: 3.9 MMOL/L — SIGNIFICANT CHANGE UP (ref 3.5–5.3)
POTASSIUM SERPL-SCNC: 4 MMOL/L — SIGNIFICANT CHANGE UP (ref 3.5–5.3)
POTASSIUM SERPL-SCNC: 4.1 MMOL/L — SIGNIFICANT CHANGE UP (ref 3.5–5.3)
POTASSIUM SERPL-SCNC: 4.2 MMOL/L — SIGNIFICANT CHANGE UP (ref 3.5–5.3)
POTASSIUM SERPL-SCNC: 4.4 MMOL/L — SIGNIFICANT CHANGE UP (ref 3.5–5.3)
POTASSIUM SERPL-SCNC: 4.5 MMOL/L — SIGNIFICANT CHANGE UP (ref 3.5–5.3)
POTASSIUM SERPL-SCNC: 4.5 MMOL/L — SIGNIFICANT CHANGE UP (ref 3.5–5.3)
PROCALCITONIN SERPL-MCNC: 0.11 NG/ML — HIGH (ref 0.02–0.1)
PROMYELOCYTES # FLD: 0.9 % — HIGH (ref 0–0)
PROT SERPL-MCNC: 8.1 G/DL — SIGNIFICANT CHANGE UP (ref 6.6–8.7)
PROT SERPL-MCNC: 8.3 G/DL — SIGNIFICANT CHANGE UP (ref 6.6–8.7)
PROT UR STRIP-MCNC: NEGATIVE
PROT UR-MCNC: 30 MG/DL
PROTEIN URINE: NEGATIVE
PROTEIN URINE: NORMAL
PROTHROM AB SERPL-ACNC: 14.7 SEC — HIGH (ref 10–12.9)
PROTHROM AB SERPL-ACNC: 15.1 SEC — HIGH (ref 10–12.9)
RAPID RVP RESULT: SIGNIFICANT CHANGE UP
RBC # BLD: 4.58 M/UL — SIGNIFICANT CHANGE UP (ref 3.8–5.2)
RBC # BLD: 4.65 M/UL — SIGNIFICANT CHANGE UP (ref 3.8–5.2)
RBC # BLD: 4.66 M/UL — SIGNIFICANT CHANGE UP (ref 3.8–5.2)
RBC # BLD: 4.68 M/UL — SIGNIFICANT CHANGE UP (ref 3.8–5.2)
RBC # BLD: 5.05 M/UL — SIGNIFICANT CHANGE UP (ref 3.8–5.2)
RBC # BLD: 5.16 M/UL — SIGNIFICANT CHANGE UP (ref 3.8–5.2)
RBC # BLD: 5.22 M/UL — HIGH (ref 3.8–5.2)
RBC # BLD: 5.39 M/UL — HIGH (ref 3.8–5.2)
RBC # BLD: 5.55 M/UL — HIGH (ref 3.8–5.2)
RBC # FLD: 14.4 % — SIGNIFICANT CHANGE UP (ref 10.3–14.5)
RBC # FLD: 14.5 % — SIGNIFICANT CHANGE UP (ref 10.3–14.5)
RBC # FLD: 14.6 % — HIGH (ref 10.3–14.5)
RBC # FLD: 14.7 % — HIGH (ref 10.3–14.5)
RBC # FLD: 14.7 % — HIGH (ref 10.3–14.5)
RBC # FLD: 14.8 % — HIGH (ref 10.3–14.5)
RBC # FLD: 14.9 % — HIGH (ref 10.3–14.5)
RBC BLD AUTO: NORMAL — SIGNIFICANT CHANGE UP
RBC CASTS # UR COMP ASSIST: ABNORMAL /HPF (ref 0–4)
RED BLOOD CELLS URINE: 0 /HPF
RED BLOOD CELLS URINE: 3 /HPF
S AUREUS DNA NOSE QL NAA+PROBE: DETECTED
SODIUM SERPL-SCNC: 132 MMOL/L — LOW (ref 135–145)
SODIUM SERPL-SCNC: 134 MMOL/L — LOW (ref 135–145)
SODIUM SERPL-SCNC: 134 MMOL/L — LOW (ref 135–145)
SODIUM SERPL-SCNC: 135 MMOL/L — SIGNIFICANT CHANGE UP (ref 135–145)
SODIUM SERPL-SCNC: 136 MMOL/L — SIGNIFICANT CHANGE UP (ref 135–145)
SODIUM SERPL-SCNC: 137 MMOL/L — SIGNIFICANT CHANGE UP (ref 135–145)
SODIUM SERPL-SCNC: 138 MMOL/L — SIGNIFICANT CHANGE UP (ref 135–145)
SODIUM SERPL-SCNC: 138 MMOL/L — SIGNIFICANT CHANGE UP (ref 135–145)
SODIUM SERPL-SCNC: 139 MMOL/L — SIGNIFICANT CHANGE UP (ref 135–145)
SODIUM SERPL-SCNC: 140 MMOL/L — SIGNIFICANT CHANGE UP (ref 135–145)
SP GR SPEC: 1.01 — SIGNIFICANT CHANGE UP (ref 1.01–1.02)
SP GR UR STRIP: 1.01
SPECIFIC GRAVITY URINE: 1.01
SPECIFIC GRAVITY URINE: 1.02
SPECIMEN SOURCE: SIGNIFICANT CHANGE UP
SQUAMOUS EPITHELIAL CELLS: 1 /HPF
SQUAMOUS EPITHELIAL CELLS: 1 /HPF
T4 AB SER-ACNC: 8 UG/DL — SIGNIFICANT CHANGE UP (ref 4.5–12)
TROPONIN T SERPL-MCNC: <0.01 NG/ML — SIGNIFICANT CHANGE UP (ref 0–0.06)
TSH SERPL-MCNC: 2.21 UIU/ML — SIGNIFICANT CHANGE UP (ref 0.27–4.2)
UROBILINOGEN FLD QL: NEGATIVE — SIGNIFICANT CHANGE UP
UROBILINOGEN URINE: NORMAL
UROBILINOGEN URINE: NORMAL
VANCOMYCIN FLD-MCNC: 14 UG/ML — SIGNIFICANT CHANGE UP
VANCOMYCIN TROUGH SERPL-MCNC: 23.6 UG/ML — HIGH (ref 10–20)
VARIANT LYMPHS # BLD: 3.5 % — SIGNIFICANT CHANGE UP (ref 0–6)
WBC # BLD: 10.48 K/UL — SIGNIFICANT CHANGE UP (ref 3.8–10.5)
WBC # BLD: 10.63 K/UL — HIGH (ref 3.8–10.5)
WBC # BLD: 10.89 K/UL — HIGH (ref 3.8–10.5)
WBC # BLD: 11.49 K/UL — HIGH (ref 3.8–10.5)
WBC # BLD: 11.62 K/UL — HIGH (ref 3.8–10.5)
WBC # BLD: 11.96 K/UL — HIGH (ref 3.8–10.5)
WBC # BLD: 15.22 K/UL — HIGH (ref 3.8–10.5)
WBC # BLD: 9.48 K/UL — SIGNIFICANT CHANGE UP (ref 3.8–10.5)
WBC # BLD: 9.7 K/UL — SIGNIFICANT CHANGE UP (ref 3.8–10.5)
WBC # FLD AUTO: 10.48 K/UL — SIGNIFICANT CHANGE UP (ref 3.8–10.5)
WBC # FLD AUTO: 10.63 K/UL — HIGH (ref 3.8–10.5)
WBC # FLD AUTO: 10.89 K/UL — HIGH (ref 3.8–10.5)
WBC # FLD AUTO: 11.49 K/UL — HIGH (ref 3.8–10.5)
WBC # FLD AUTO: 11.62 K/UL — HIGH (ref 3.8–10.5)
WBC # FLD AUTO: 11.96 K/UL — HIGH (ref 3.8–10.5)
WBC # FLD AUTO: 15.22 K/UL — HIGH (ref 3.8–10.5)
WBC # FLD AUTO: 9.48 K/UL — SIGNIFICANT CHANGE UP (ref 3.8–10.5)
WBC # FLD AUTO: 9.7 K/UL — SIGNIFICANT CHANGE UP (ref 3.8–10.5)
WBC UR QL: >50
WHITE BLOOD CELLS URINE: 0 /HPF
WHITE BLOOD CELLS URINE: 1 /HPF

## 2019-01-01 PROCEDURE — 93970 EXTREMITY STUDY: CPT | Mod: 26

## 2019-01-01 PROCEDURE — 80048 BASIC METABOLIC PNL TOTAL CA: CPT

## 2019-01-01 PROCEDURE — 99232 SBSQ HOSP IP/OBS MODERATE 35: CPT

## 2019-01-01 PROCEDURE — 87798 DETECT AGENT NOS DNA AMP: CPT

## 2019-01-01 PROCEDURE — 99214 OFFICE O/P EST MOD 30 MIN: CPT

## 2019-01-01 PROCEDURE — 80162 ASSAY OF DIGOXIN TOTAL: CPT

## 2019-01-01 PROCEDURE — 71045 X-RAY EXAM CHEST 1 VIEW: CPT | Mod: 26

## 2019-01-01 PROCEDURE — 71250 CT THORAX DX C-: CPT | Mod: 26

## 2019-01-01 PROCEDURE — 81001 URINALYSIS AUTO W/SCOPE: CPT

## 2019-01-01 PROCEDURE — 87186 SC STD MICRODIL/AGAR DIL: CPT

## 2019-01-01 PROCEDURE — 97116 GAIT TRAINING THERAPY: CPT

## 2019-01-01 PROCEDURE — 17003 DESTRUCT PREMALG LES 2-14: CPT

## 2019-01-01 PROCEDURE — 99233 SBSQ HOSP IP/OBS HIGH 50: CPT

## 2019-01-01 PROCEDURE — 99223 1ST HOSP IP/OBS HIGH 75: CPT

## 2019-01-01 PROCEDURE — 81003 URINALYSIS AUTO W/O SCOPE: CPT | Mod: QW

## 2019-01-01 PROCEDURE — 99222 1ST HOSP IP/OBS MODERATE 55: CPT

## 2019-01-01 PROCEDURE — 93306 TTE W/DOPPLER COMPLETE: CPT

## 2019-01-01 PROCEDURE — 84100 ASSAY OF PHOSPHORUS: CPT

## 2019-01-01 PROCEDURE — 96375 TX/PRO/DX INJ NEW DRUG ADDON: CPT

## 2019-01-01 PROCEDURE — 83735 ASSAY OF MAGNESIUM: CPT

## 2019-01-01 PROCEDURE — 84443 ASSAY THYROID STIM HORMONE: CPT

## 2019-01-01 PROCEDURE — 36415 COLL VENOUS BLD VENIPUNCTURE: CPT

## 2019-01-01 PROCEDURE — 93880 EXTRACRANIAL BILAT STUDY: CPT

## 2019-01-01 PROCEDURE — 83880 ASSAY OF NATRIURETIC PEPTIDE: CPT

## 2019-01-01 PROCEDURE — 92610 EVALUATE SWALLOWING FUNCTION: CPT

## 2019-01-01 PROCEDURE — 80053 COMPREHEN METABOLIC PANEL: CPT

## 2019-01-01 PROCEDURE — 97163 PT EVAL HIGH COMPLEX 45 MIN: CPT

## 2019-01-01 PROCEDURE — 84484 ASSAY OF TROPONIN QUANT: CPT

## 2019-01-01 PROCEDURE — 93970 EXTREMITY STUDY: CPT

## 2019-01-01 PROCEDURE — 99213 OFFICE O/P EST LOW 20 MIN: CPT | Mod: 25

## 2019-01-01 PROCEDURE — 76775 US EXAM ABDO BACK WALL LIM: CPT

## 2019-01-01 PROCEDURE — 85730 THROMBOPLASTIN TIME PARTIAL: CPT

## 2019-01-01 PROCEDURE — 96374 THER/PROPH/DIAG INJ IV PUSH: CPT

## 2019-01-01 PROCEDURE — 87040 BLOOD CULTURE FOR BACTERIA: CPT

## 2019-01-01 PROCEDURE — 80202 ASSAY OF VANCOMYCIN: CPT

## 2019-01-01 PROCEDURE — 85027 COMPLETE CBC AUTOMATED: CPT

## 2019-01-01 PROCEDURE — 87633 RESP VIRUS 12-25 TARGETS: CPT

## 2019-01-01 PROCEDURE — 83690 ASSAY OF LIPASE: CPT

## 2019-01-01 PROCEDURE — 83605 ASSAY OF LACTIC ACID: CPT

## 2019-01-01 PROCEDURE — 51701 INSERT BLADDER CATHETER: CPT

## 2019-01-01 PROCEDURE — 93000 ELECTROCARDIOGRAM COMPLETE: CPT | Mod: 59

## 2019-01-01 PROCEDURE — 85610 PROTHROMBIN TIME: CPT

## 2019-01-01 PROCEDURE — 93005 ELECTROCARDIOGRAM TRACING: CPT

## 2019-01-01 PROCEDURE — 87486 CHLMYD PNEUM DNA AMP PROBE: CPT

## 2019-01-01 PROCEDURE — 99285 EMERGENCY DEPT VISIT HI MDM: CPT | Mod: 25

## 2019-01-01 PROCEDURE — 93288 INTERROG EVL PM/LDLS PM IP: CPT

## 2019-01-01 PROCEDURE — 94640 AIRWAY INHALATION TREATMENT: CPT

## 2019-01-01 PROCEDURE — 87641 MR-STAPH DNA AMP PROBE: CPT

## 2019-01-01 PROCEDURE — 93306 TTE W/DOPPLER COMPLETE: CPT | Mod: 26

## 2019-01-01 PROCEDURE — 87581 M.PNEUMON DNA AMP PROBE: CPT

## 2019-01-01 PROCEDURE — 93010 ELECTROCARDIOGRAM REPORT: CPT

## 2019-01-01 PROCEDURE — 71250 CT THORAX DX C-: CPT

## 2019-01-01 PROCEDURE — 87640 STAPH A DNA AMP PROBE: CPT

## 2019-01-01 PROCEDURE — 71045 X-RAY EXAM CHEST 1 VIEW: CPT

## 2019-01-01 PROCEDURE — 94760 N-INVAS EAR/PLS OXIMETRY 1: CPT

## 2019-01-01 PROCEDURE — 17000 DESTRUCT PREMALG LESION: CPT

## 2019-01-01 PROCEDURE — 99239 HOSP IP/OBS DSCHRG MGMT >30: CPT

## 2019-01-01 PROCEDURE — 76775 US EXAM ABDO BACK WALL LIM: CPT | Mod: 26

## 2019-01-01 PROCEDURE — 99285 EMERGENCY DEPT VISIT HI MDM: CPT

## 2019-01-01 PROCEDURE — 84436 ASSAY OF TOTAL THYROXINE: CPT

## 2019-01-01 PROCEDURE — 97110 THERAPEUTIC EXERCISES: CPT

## 2019-01-01 PROCEDURE — 87086 URINE CULTURE/COLONY COUNT: CPT

## 2019-01-01 PROCEDURE — 97530 THERAPEUTIC ACTIVITIES: CPT

## 2019-01-01 PROCEDURE — 84145 PROCALCITONIN (PCT): CPT

## 2019-01-01 RX ORDER — MINERAL OIL
133 OIL (ML) MISCELLANEOUS ONCE
Refills: 0 | Status: COMPLETED | OUTPATIENT
Start: 2019-01-01 | End: 2019-01-01

## 2019-01-01 RX ORDER — FUROSEMIDE 40 MG
1 TABLET ORAL
Qty: 0 | Refills: 0 | DISCHARGE
Start: 2019-01-01

## 2019-01-01 RX ORDER — SENNA PLUS 8.6 MG/1
2 TABLET ORAL AT BEDTIME
Refills: 0 | Status: DISCONTINUED | OUTPATIENT
Start: 2019-01-01 | End: 2019-01-01

## 2019-01-01 RX ORDER — FUROSEMIDE 40 MG
40 TABLET ORAL ONCE
Refills: 0 | Status: COMPLETED | OUTPATIENT
Start: 2019-01-01 | End: 2019-01-01

## 2019-01-01 RX ORDER — ONDANSETRON 8 MG/1
4 TABLET, FILM COATED ORAL ONCE
Refills: 0 | Status: COMPLETED | OUTPATIENT
Start: 2019-01-01 | End: 2019-01-01

## 2019-01-01 RX ORDER — POTASSIUM CHLORIDE 20 MEQ
40 PACKET (EA) ORAL ONCE
Refills: 0 | Status: COMPLETED | OUTPATIENT
Start: 2019-01-01 | End: 2019-01-01

## 2019-01-01 RX ORDER — METOPROLOL SUCCINATE 50 MG/1
50 TABLET, EXTENDED RELEASE ORAL DAILY
Qty: 90 | Refills: 3 | Status: ACTIVE | COMMUNITY
Start: 2018-03-04 | End: 1900-01-01

## 2019-01-01 RX ORDER — POLYETHYLENE GLYCOL 3350 17 G/17G
17 POWDER, FOR SOLUTION ORAL DAILY
Refills: 0 | Status: DISCONTINUED | OUTPATIENT
Start: 2019-01-01 | End: 2019-01-01

## 2019-01-01 RX ORDER — SODIUM CHLORIDE 9 MG/ML
250 INJECTION INTRAMUSCULAR; INTRAVENOUS; SUBCUTANEOUS ONCE
Refills: 0 | Status: COMPLETED | OUTPATIENT
Start: 2019-01-01 | End: 2019-01-01

## 2019-01-01 RX ORDER — CHLORHEXIDINE GLUCONATE, 0.12% ORAL RINSE 1.2 MG/ML
0.12 SOLUTION DENTAL
Qty: 473 | Refills: 0 | Status: ACTIVE | COMMUNITY
Start: 2019-01-01

## 2019-01-01 RX ORDER — FUROSEMIDE 40 MG
40 TABLET ORAL
Refills: 0 | Status: DISCONTINUED | OUTPATIENT
Start: 2019-01-01 | End: 2019-01-01

## 2019-01-01 RX ORDER — POTASSIUM CHLORIDE 20 MEQ
1 PACKET (EA) ORAL
Qty: 0 | Refills: 0 | DISCHARGE
Start: 2019-01-01

## 2019-01-01 RX ORDER — METOPROLOL TARTRATE 50 MG
25 TABLET ORAL AT BEDTIME
Refills: 0 | Status: DISCONTINUED | OUTPATIENT
Start: 2019-01-01 | End: 2019-01-01

## 2019-01-01 RX ORDER — DIGOXIN 250 MCG
0.12 TABLET ORAL ONCE
Refills: 0 | Status: COMPLETED | OUTPATIENT
Start: 2019-01-01 | End: 2019-01-01

## 2019-01-01 RX ORDER — METOPROLOL TARTRATE 50 MG
25 TABLET ORAL ONCE
Refills: 0 | Status: COMPLETED | OUTPATIENT
Start: 2019-01-01 | End: 2019-01-01

## 2019-01-01 RX ORDER — METOPROLOL TARTRATE 50 MG
50 TABLET ORAL ONCE
Refills: 0 | Status: COMPLETED | OUTPATIENT
Start: 2019-01-01 | End: 2019-01-01

## 2019-01-01 RX ORDER — POLYETHYLENE GLYCOL 3350 17 G/17G
17 POWDER, FOR SOLUTION ORAL ONCE
Refills: 0 | Status: COMPLETED | OUTPATIENT
Start: 2019-01-01 | End: 2019-01-01

## 2019-01-01 RX ORDER — AZITHROMYCIN 500 MG/1
500 TABLET, FILM COATED ORAL DAILY
Refills: 0 | Status: DISCONTINUED | OUTPATIENT
Start: 2019-01-01 | End: 2019-01-01

## 2019-01-01 RX ORDER — METOPROLOL TARTRATE 50 MG
1 TABLET ORAL
Qty: 0 | Refills: 0 | DISCHARGE
Start: 2019-01-01

## 2019-01-01 RX ORDER — BUDESONIDE AND FORMOTEROL FUMARATE DIHYDRATE 160; 4.5 UG/1; UG/1
2 AEROSOL RESPIRATORY (INHALATION)
Refills: 0 | Status: DISCONTINUED | OUTPATIENT
Start: 2019-01-01 | End: 2019-01-01

## 2019-01-01 RX ORDER — VANCOMYCIN HCL 1 G
1000 VIAL (EA) INTRAVENOUS ONCE
Refills: 0 | Status: COMPLETED | OUTPATIENT
Start: 2019-01-01 | End: 2019-01-01

## 2019-01-01 RX ORDER — METOPROLOL TARTRATE 50 MG
25 TABLET ORAL EVERY 8 HOURS
Refills: 0 | Status: DISCONTINUED | OUTPATIENT
Start: 2019-01-01 | End: 2019-01-01

## 2019-01-01 RX ORDER — LEVETIRACETAM 250 MG/1
500 TABLET, FILM COATED ORAL
Refills: 0 | Status: DISCONTINUED | OUTPATIENT
Start: 2019-01-01 | End: 2019-01-01

## 2019-01-01 RX ORDER — FUROSEMIDE 40 MG
20 TABLET ORAL DAILY
Refills: 0 | Status: DISCONTINUED | OUTPATIENT
Start: 2019-01-01 | End: 2019-01-01

## 2019-01-01 RX ORDER — CHOLECALCIFEROL (VITAMIN D3) 125 MCG
1000 CAPSULE ORAL DAILY
Refills: 0 | Status: DISCONTINUED | OUTPATIENT
Start: 2019-01-01 | End: 2019-01-01

## 2019-01-01 RX ORDER — DIGOXIN 250 MCG
0.5 TABLET ORAL ONCE
Refills: 0 | Status: COMPLETED | OUTPATIENT
Start: 2019-01-01 | End: 2019-01-01

## 2019-01-01 RX ORDER — METOPROLOL TARTRATE 50 MG
50 TABLET ORAL DAILY
Refills: 0 | Status: DISCONTINUED | OUTPATIENT
Start: 2019-01-01 | End: 2019-01-01

## 2019-01-01 RX ORDER — VANCOMYCIN HCL 1 G
750 VIAL (EA) INTRAVENOUS EVERY 12 HOURS
Refills: 0 | Status: DISCONTINUED | OUTPATIENT
Start: 2019-01-01 | End: 2019-01-01

## 2019-01-01 RX ORDER — CEFTRIAXONE 500 MG/1
INJECTION, POWDER, FOR SOLUTION INTRAMUSCULAR; INTRAVENOUS
Refills: 0 | Status: DISCONTINUED | OUTPATIENT
Start: 2019-01-01 | End: 2019-01-01

## 2019-01-01 RX ORDER — SENNA PLUS 8.6 MG/1
2 TABLET ORAL
Qty: 0 | Refills: 0 | DISCHARGE
Start: 2019-01-01

## 2019-01-01 RX ORDER — DILTIAZEM HCL 120 MG
10 CAPSULE, EXT RELEASE 24 HR ORAL ONCE
Refills: 0 | Status: COMPLETED | OUTPATIENT
Start: 2019-01-01 | End: 2019-01-01

## 2019-01-01 RX ORDER — FUROSEMIDE 40 MG
1 TABLET ORAL
Qty: 0 | Refills: 0 | DISCHARGE

## 2019-01-01 RX ORDER — FUROSEMIDE 40 MG
40 TABLET ORAL DAILY
Refills: 0 | Status: DISCONTINUED | OUTPATIENT
Start: 2019-01-01 | End: 2019-01-01

## 2019-01-01 RX ORDER — METOPROLOL TARTRATE 50 MG
5 TABLET ORAL ONCE
Refills: 0 | Status: COMPLETED | OUTPATIENT
Start: 2019-01-01 | End: 2019-01-01

## 2019-01-01 RX ORDER — CEFTRIAXONE 500 MG/1
1000 INJECTION, POWDER, FOR SOLUTION INTRAMUSCULAR; INTRAVENOUS EVERY 24 HOURS
Refills: 0 | Status: DISCONTINUED | OUTPATIENT
Start: 2019-01-01 | End: 2019-01-01

## 2019-01-01 RX ORDER — CEFTRIAXONE 500 MG/1
1000 INJECTION, POWDER, FOR SOLUTION INTRAMUSCULAR; INTRAVENOUS ONCE
Refills: 0 | Status: COMPLETED | OUTPATIENT
Start: 2019-01-01 | End: 2019-01-01

## 2019-01-01 RX ORDER — FUROSEMIDE 40 MG
20 TABLET ORAL ONCE
Refills: 0 | Status: COMPLETED | OUTPATIENT
Start: 2019-01-01 | End: 2019-01-01

## 2019-01-01 RX ORDER — MUPIROCIN 20 MG/G
1 OINTMENT TOPICAL
Refills: 0 | Status: COMPLETED | OUTPATIENT
Start: 2019-01-01 | End: 2019-01-01

## 2019-01-01 RX ORDER — POTASSIUM CHLORIDE 20 MEQ
20 PACKET (EA) ORAL DAILY
Refills: 0 | Status: DISCONTINUED | OUTPATIENT
Start: 2019-01-01 | End: 2019-01-01

## 2019-01-01 RX ORDER — MAGNESIUM HYDROXIDE 400 MG/1
30 TABLET, CHEWABLE ORAL DAILY
Refills: 0 | Status: DISCONTINUED | OUTPATIENT
Start: 2019-01-01 | End: 2019-01-01

## 2019-01-01 RX ORDER — IPRATROPIUM/ALBUTEROL SULFATE 18-103MCG
3 AEROSOL WITH ADAPTER (GRAM) INHALATION EVERY 6 HOURS
Refills: 0 | Status: COMPLETED | OUTPATIENT
Start: 2019-01-01 | End: 2019-01-01

## 2019-01-01 RX ORDER — HEPARIN SODIUM 5000 [USP'U]/ML
5000 INJECTION INTRAVENOUS; SUBCUTANEOUS EVERY 12 HOURS
Refills: 0 | Status: DISCONTINUED | OUTPATIENT
Start: 2019-01-01 | End: 2019-01-01

## 2019-01-01 RX ORDER — FLUTICASONE FUROATE AND VILANTEROL TRIFENATATE 200; 25 UG/1; UG/1
200-25 POWDER RESPIRATORY (INHALATION)
Qty: 60 | Refills: 0 | Status: ACTIVE | COMMUNITY
Start: 2019-03-21

## 2019-01-01 RX ORDER — INFLUENZA VIRUS VACCINE 15; 15; 15; 15 UG/.5ML; UG/.5ML; UG/.5ML; UG/.5ML
0.5 SUSPENSION INTRAMUSCULAR ONCE
Refills: 0 | Status: COMPLETED | OUTPATIENT
Start: 2019-01-01 | End: 2019-01-01

## 2019-01-01 RX ORDER — FOLIC ACID 0.8 MG
1 TABLET ORAL DAILY
Refills: 0 | Status: DISCONTINUED | OUTPATIENT
Start: 2019-01-01 | End: 2019-01-01

## 2019-01-01 RX ORDER — ASCORBIC ACID 60 MG
500 TABLET,CHEWABLE ORAL DAILY
Refills: 0 | Status: DISCONTINUED | OUTPATIENT
Start: 2019-01-01 | End: 2019-01-01

## 2019-01-01 RX ORDER — VANCOMYCIN HCL 1 G
500 VIAL (EA) INTRAVENOUS EVERY 12 HOURS
Refills: 0 | Status: DISCONTINUED | OUTPATIENT
Start: 2019-01-01 | End: 2019-01-01

## 2019-01-01 RX ORDER — DIGOXIN 250 MCG
1 TABLET ORAL
Qty: 0 | Refills: 0 | DISCHARGE
Start: 2019-01-01

## 2019-01-01 RX ORDER — DIGOXIN 250 MCG
0.12 TABLET ORAL DAILY
Refills: 0 | Status: DISCONTINUED | OUTPATIENT
Start: 2019-01-01 | End: 2019-01-01

## 2019-01-01 RX ORDER — ASPIRIN/CALCIUM CARB/MAGNESIUM 324 MG
81 TABLET ORAL DAILY
Refills: 0 | Status: DISCONTINUED | OUTPATIENT
Start: 2019-01-01 | End: 2019-01-01

## 2019-01-01 RX ORDER — ATORVASTATIN CALCIUM 80 MG/1
20 TABLET, FILM COATED ORAL AT BEDTIME
Refills: 0 | Status: DISCONTINUED | OUTPATIENT
Start: 2019-01-01 | End: 2019-01-01

## 2019-01-01 RX ORDER — POTASSIUM CHLORIDE 20 MEQ
10 PACKET (EA) ORAL DAILY
Refills: 0 | Status: DISCONTINUED | OUTPATIENT
Start: 2019-01-01 | End: 2019-01-01

## 2019-01-01 RX ORDER — SACCHAROMYCES BOULARDII 250 MG
250 POWDER IN PACKET (EA) ORAL
Refills: 0 | Status: DISCONTINUED | OUTPATIENT
Start: 2019-01-01 | End: 2019-01-01

## 2019-01-01 RX ORDER — BENZOCAINE AND MENTHOL 5; 1 G/100ML; G/100ML
1 LIQUID ORAL EVERY 4 HOURS
Refills: 0 | Status: DISCONTINUED | OUTPATIENT
Start: 2019-01-01 | End: 2019-01-01

## 2019-01-01 RX ORDER — SODIUM CHLORIDE 9 MG/ML
500 INJECTION, SOLUTION INTRAVENOUS
Refills: 0 | Status: COMPLETED | OUTPATIENT
Start: 2019-01-01 | End: 2019-01-01

## 2019-01-01 RX ORDER — LEVETIRACETAM 500 MG/1
500 TABLET, FILM COATED ORAL DAILY
Qty: 14 | Refills: 0 | Status: ACTIVE | COMMUNITY
Start: 1900-01-01 | End: 1900-01-01

## 2019-01-01 RX ORDER — LEVOFLOXACIN 250 MG/1
250 TABLET, FILM COATED ORAL
Qty: 7 | Refills: 0 | Status: ACTIVE | COMMUNITY
Start: 2019-01-01

## 2019-01-01 RX ADMIN — Medication 250 MILLIGRAM(S): at 16:02

## 2019-01-01 RX ADMIN — Medication 50 MILLIGRAM(S): at 05:09

## 2019-01-01 RX ADMIN — Medication 0.12 MILLIGRAM(S): at 11:16

## 2019-01-01 RX ADMIN — Medication 1000 UNIT(S): at 09:49

## 2019-01-01 RX ADMIN — Medication 1000 UNIT(S): at 12:24

## 2019-01-01 RX ADMIN — HEPARIN SODIUM 5000 UNIT(S): 5000 INJECTION INTRAVENOUS; SUBCUTANEOUS at 21:43

## 2019-01-01 RX ADMIN — Medication 1000 UNIT(S): at 11:43

## 2019-01-01 RX ADMIN — LEVETIRACETAM 500 MILLIGRAM(S): 250 TABLET, FILM COATED ORAL at 17:33

## 2019-01-01 RX ADMIN — Medication 100 MILLIGRAM(S): at 06:22

## 2019-01-01 RX ADMIN — HEPARIN SODIUM 5000 UNIT(S): 5000 INJECTION INTRAVENOUS; SUBCUTANEOUS at 22:33

## 2019-01-01 RX ADMIN — HEPARIN SODIUM 5000 UNIT(S): 5000 INJECTION INTRAVENOUS; SUBCUTANEOUS at 16:01

## 2019-01-01 RX ADMIN — HEPARIN SODIUM 5000 UNIT(S): 5000 INJECTION INTRAVENOUS; SUBCUTANEOUS at 21:02

## 2019-01-01 RX ADMIN — Medication 3 MILLILITER(S): at 17:35

## 2019-01-01 RX ADMIN — Medication 10 MILLIEQUIVALENT(S): at 11:16

## 2019-01-01 RX ADMIN — Medication 1000 UNIT(S): at 12:41

## 2019-01-01 RX ADMIN — Medication 40 MILLIGRAM(S): at 06:45

## 2019-01-01 RX ADMIN — MAGNESIUM HYDROXIDE 30 MILLILITER(S): 400 TABLET, CHEWABLE ORAL at 18:08

## 2019-01-01 RX ADMIN — Medication 500 MILLIGRAM(S): at 12:17

## 2019-01-01 RX ADMIN — Medication 20 MILLIGRAM(S): at 17:28

## 2019-01-01 RX ADMIN — Medication 25 MILLIGRAM(S): at 22:51

## 2019-01-01 RX ADMIN — LEVETIRACETAM 500 MILLIGRAM(S): 250 TABLET, FILM COATED ORAL at 17:21

## 2019-01-01 RX ADMIN — MUPIROCIN 1 APPLICATION(S): 20 OINTMENT TOPICAL at 18:35

## 2019-01-01 RX ADMIN — LEVETIRACETAM 500 MILLIGRAM(S): 250 TABLET, FILM COATED ORAL at 06:30

## 2019-01-01 RX ADMIN — Medication 250 MILLIGRAM(S): at 05:54

## 2019-01-01 RX ADMIN — ATORVASTATIN CALCIUM 20 MILLIGRAM(S): 80 TABLET, FILM COATED ORAL at 22:39

## 2019-01-01 RX ADMIN — ATORVASTATIN CALCIUM 20 MILLIGRAM(S): 80 TABLET, FILM COATED ORAL at 22:42

## 2019-01-01 RX ADMIN — HEPARIN SODIUM 5000 UNIT(S): 5000 INJECTION INTRAVENOUS; SUBCUTANEOUS at 21:26

## 2019-01-01 RX ADMIN — Medication 81 MILLIGRAM(S): at 12:06

## 2019-01-01 RX ADMIN — Medication 25 MILLIGRAM(S): at 22:42

## 2019-01-01 RX ADMIN — Medication 25 MILLIGRAM(S): at 22:49

## 2019-01-01 RX ADMIN — LEVETIRACETAM 500 MILLIGRAM(S): 250 TABLET, FILM COATED ORAL at 17:28

## 2019-01-01 RX ADMIN — Medication 1 MILLIGRAM(S): at 11:50

## 2019-01-01 RX ADMIN — LEVETIRACETAM 500 MILLIGRAM(S): 250 TABLET, FILM COATED ORAL at 17:38

## 2019-01-01 RX ADMIN — Medication 1000 UNIT(S): at 11:30

## 2019-01-01 RX ADMIN — BUDESONIDE AND FORMOTEROL FUMARATE DIHYDRATE 2 PUFF(S): 160; 4.5 AEROSOL RESPIRATORY (INHALATION) at 08:37

## 2019-01-01 RX ADMIN — ATORVASTATIN CALCIUM 20 MILLIGRAM(S): 80 TABLET, FILM COATED ORAL at 21:02

## 2019-01-01 RX ADMIN — SENNA PLUS 2 TABLET(S): 8.6 TABLET ORAL at 22:39

## 2019-01-01 RX ADMIN — Medication 250 MILLIGRAM(S): at 21:01

## 2019-01-01 RX ADMIN — SENNA PLUS 2 TABLET(S): 8.6 TABLET ORAL at 22:33

## 2019-01-01 RX ADMIN — LEVETIRACETAM 500 MILLIGRAM(S): 250 TABLET, FILM COATED ORAL at 18:08

## 2019-01-01 RX ADMIN — HEPARIN SODIUM 5000 UNIT(S): 5000 INJECTION INTRAVENOUS; SUBCUTANEOUS at 05:34

## 2019-01-01 RX ADMIN — Medication 40 MILLIGRAM(S): at 06:30

## 2019-01-01 RX ADMIN — HEPARIN SODIUM 5000 UNIT(S): 5000 INJECTION INTRAVENOUS; SUBCUTANEOUS at 22:04

## 2019-01-01 RX ADMIN — Medication 1 MILLIGRAM(S): at 12:41

## 2019-01-01 RX ADMIN — Medication 40 MILLIEQUIVALENT(S): at 16:00

## 2019-01-01 RX ADMIN — Medication 20 MILLIGRAM(S): at 06:06

## 2019-01-01 RX ADMIN — Medication 40 MILLIGRAM(S): at 06:42

## 2019-01-01 RX ADMIN — Medication 25 MILLIGRAM(S): at 14:15

## 2019-01-01 RX ADMIN — LEVETIRACETAM 500 MILLIGRAM(S): 250 TABLET, FILM COATED ORAL at 17:06

## 2019-01-01 RX ADMIN — LEVETIRACETAM 500 MILLIGRAM(S): 250 TABLET, FILM COATED ORAL at 17:42

## 2019-01-01 RX ADMIN — SODIUM CHLORIDE 80 MILLILITER(S): 9 INJECTION, SOLUTION INTRAVENOUS at 11:50

## 2019-01-01 RX ADMIN — Medication 250 MILLIGRAM(S): at 17:42

## 2019-01-01 RX ADMIN — Medication 1000 UNIT(S): at 14:08

## 2019-01-01 RX ADMIN — Medication 20 MILLIGRAM(S): at 05:53

## 2019-01-01 RX ADMIN — Medication 500 MILLIGRAM(S): at 11:16

## 2019-01-01 RX ADMIN — LEVETIRACETAM 500 MILLIGRAM(S): 250 TABLET, FILM COATED ORAL at 05:54

## 2019-01-01 RX ADMIN — Medication 1 MILLIGRAM(S): at 11:30

## 2019-01-01 RX ADMIN — Medication 250 MILLIGRAM(S): at 06:30

## 2019-01-01 RX ADMIN — Medication 40 MILLIGRAM(S): at 06:22

## 2019-01-01 RX ADMIN — Medication 500 MILLIGRAM(S): at 12:06

## 2019-01-01 RX ADMIN — Medication 20 MILLIEQUIVALENT(S): at 12:24

## 2019-01-01 RX ADMIN — Medication 20 MILLIGRAM(S): at 05:06

## 2019-01-01 RX ADMIN — Medication 0.12 MILLIGRAM(S): at 06:16

## 2019-01-01 RX ADMIN — Medication 81 MILLIGRAM(S): at 08:58

## 2019-01-01 RX ADMIN — Medication 81 MILLIGRAM(S): at 14:08

## 2019-01-01 RX ADMIN — LEVETIRACETAM 500 MILLIGRAM(S): 250 TABLET, FILM COATED ORAL at 17:14

## 2019-01-01 RX ADMIN — Medication 500 MILLIGRAM(S): at 13:12

## 2019-01-01 RX ADMIN — Medication 50 MILLIGRAM(S): at 09:47

## 2019-01-01 RX ADMIN — Medication 100 MILLIGRAM(S): at 17:28

## 2019-01-01 RX ADMIN — HEPARIN SODIUM 5000 UNIT(S): 5000 INJECTION INTRAVENOUS; SUBCUTANEOUS at 22:42

## 2019-01-01 RX ADMIN — Medication 100 MILLIGRAM(S): at 06:11

## 2019-01-01 RX ADMIN — Medication 40 MILLIGRAM(S): at 18:27

## 2019-01-01 RX ADMIN — Medication 25 MILLIGRAM(S): at 07:44

## 2019-01-01 RX ADMIN — LEVETIRACETAM 500 MILLIGRAM(S): 250 TABLET, FILM COATED ORAL at 06:24

## 2019-01-01 RX ADMIN — Medication 50 MILLIGRAM(S): at 12:32

## 2019-01-01 RX ADMIN — ONDANSETRON 4 MILLIGRAM(S): 8 TABLET, FILM COATED ORAL at 22:38

## 2019-01-01 RX ADMIN — ATORVASTATIN CALCIUM 20 MILLIGRAM(S): 80 TABLET, FILM COATED ORAL at 21:26

## 2019-01-01 RX ADMIN — CEFTRIAXONE 100 MILLIGRAM(S): 500 INJECTION, POWDER, FOR SOLUTION INTRAMUSCULAR; INTRAVENOUS at 08:15

## 2019-01-01 RX ADMIN — Medication 20 MILLIGRAM(S): at 06:24

## 2019-01-01 RX ADMIN — Medication 10 MILLIEQUIVALENT(S): at 12:41

## 2019-01-01 RX ADMIN — LEVETIRACETAM 500 MILLIGRAM(S): 250 TABLET, FILM COATED ORAL at 05:57

## 2019-01-01 RX ADMIN — Medication 250 MILLIGRAM(S): at 06:06

## 2019-01-01 RX ADMIN — ATORVASTATIN CALCIUM 20 MILLIGRAM(S): 80 TABLET, FILM COATED ORAL at 21:43

## 2019-01-01 RX ADMIN — Medication 5 MILLIGRAM(S): at 18:22

## 2019-01-01 RX ADMIN — Medication 250 MILLIGRAM(S): at 17:21

## 2019-01-01 RX ADMIN — Medication 250 MILLIGRAM(S): at 18:08

## 2019-01-01 RX ADMIN — BENZOCAINE AND MENTHOL 1 LOZENGE: 5; 1 LIQUID ORAL at 01:15

## 2019-01-01 RX ADMIN — HEPARIN SODIUM 5000 UNIT(S): 5000 INJECTION INTRAVENOUS; SUBCUTANEOUS at 22:39

## 2019-01-01 RX ADMIN — LEVETIRACETAM 500 MILLIGRAM(S): 250 TABLET, FILM COATED ORAL at 16:03

## 2019-01-01 RX ADMIN — ATORVASTATIN CALCIUM 20 MILLIGRAM(S): 80 TABLET, FILM COATED ORAL at 22:49

## 2019-01-01 RX ADMIN — Medication 81 MILLIGRAM(S): at 09:49

## 2019-01-01 RX ADMIN — Medication 1 MILLIGRAM(S): at 09:49

## 2019-01-01 RX ADMIN — Medication 500 MILLIGRAM(S): at 09:49

## 2019-01-01 RX ADMIN — LEVETIRACETAM 500 MILLIGRAM(S): 250 TABLET, FILM COATED ORAL at 16:00

## 2019-01-01 RX ADMIN — ATORVASTATIN CALCIUM 20 MILLIGRAM(S): 80 TABLET, FILM COATED ORAL at 23:10

## 2019-01-01 RX ADMIN — Medication 250 MILLIGRAM(S): at 06:11

## 2019-01-01 RX ADMIN — ATORVASTATIN CALCIUM 20 MILLIGRAM(S): 80 TABLET, FILM COATED ORAL at 22:33

## 2019-01-01 RX ADMIN — Medication 1 MILLIGRAM(S): at 14:08

## 2019-01-01 RX ADMIN — MUPIROCIN 1 APPLICATION(S): 20 OINTMENT TOPICAL at 17:42

## 2019-01-01 RX ADMIN — Medication 500 MILLIGRAM(S): at 08:33

## 2019-01-01 RX ADMIN — Medication 0.12 MILLIGRAM(S): at 11:43

## 2019-01-01 RX ADMIN — Medication 20 MILLIEQUIVALENT(S): at 11:30

## 2019-01-01 RX ADMIN — ATORVASTATIN CALCIUM 20 MILLIGRAM(S): 80 TABLET, FILM COATED ORAL at 22:05

## 2019-01-01 RX ADMIN — Medication 1 MILLIGRAM(S): at 11:43

## 2019-01-01 RX ADMIN — LEVETIRACETAM 500 MILLIGRAM(S): 250 TABLET, FILM COATED ORAL at 18:48

## 2019-01-01 RX ADMIN — Medication 500 MILLIGRAM(S): at 14:08

## 2019-01-01 RX ADMIN — MUPIROCIN 1 APPLICATION(S): 20 OINTMENT TOPICAL at 06:11

## 2019-01-01 RX ADMIN — Medication 250 MILLIGRAM(S): at 06:17

## 2019-01-01 RX ADMIN — Medication 81 MILLIGRAM(S): at 12:24

## 2019-01-01 RX ADMIN — MUPIROCIN 1 APPLICATION(S): 20 OINTMENT TOPICAL at 06:53

## 2019-01-01 RX ADMIN — ATORVASTATIN CALCIUM 20 MILLIGRAM(S): 80 TABLET, FILM COATED ORAL at 21:27

## 2019-01-01 RX ADMIN — Medication 1 MILLIGRAM(S): at 08:32

## 2019-01-01 RX ADMIN — SENNA PLUS 2 TABLET(S): 8.6 TABLET ORAL at 21:01

## 2019-01-01 RX ADMIN — Medication 81 MILLIGRAM(S): at 08:32

## 2019-01-01 RX ADMIN — Medication 20 MILLIEQUIVALENT(S): at 08:58

## 2019-01-01 RX ADMIN — Medication 0.12 MILLIGRAM(S): at 13:49

## 2019-01-01 RX ADMIN — Medication 250 MILLIGRAM(S): at 06:21

## 2019-01-01 RX ADMIN — HEPARIN SODIUM 5000 UNIT(S): 5000 INJECTION INTRAVENOUS; SUBCUTANEOUS at 23:10

## 2019-01-01 RX ADMIN — Medication 25 MILLIGRAM(S): at 14:53

## 2019-01-01 RX ADMIN — LEVETIRACETAM 500 MILLIGRAM(S): 250 TABLET, FILM COATED ORAL at 09:46

## 2019-01-01 RX ADMIN — ATORVASTATIN CALCIUM 20 MILLIGRAM(S): 80 TABLET, FILM COATED ORAL at 00:21

## 2019-01-01 RX ADMIN — HEPARIN SODIUM 5000 UNIT(S): 5000 INJECTION INTRAVENOUS; SUBCUTANEOUS at 12:07

## 2019-01-01 RX ADMIN — Medication 500 MILLIGRAM(S): at 11:51

## 2019-01-01 RX ADMIN — Medication 3 MILLILITER(S): at 20:43

## 2019-01-01 RX ADMIN — Medication 50 MILLIGRAM(S): at 20:06

## 2019-01-01 RX ADMIN — Medication 250 MILLIGRAM(S): at 12:37

## 2019-01-01 RX ADMIN — ATORVASTATIN CALCIUM 20 MILLIGRAM(S): 80 TABLET, FILM COATED ORAL at 22:26

## 2019-01-01 RX ADMIN — Medication 250 MILLIGRAM(S): at 18:48

## 2019-01-01 RX ADMIN — Medication 500 MILLIGRAM(S): at 12:41

## 2019-01-01 RX ADMIN — Medication 1000 UNIT(S): at 11:50

## 2019-01-01 RX ADMIN — BENZOCAINE AND MENTHOL 1 LOZENGE: 5; 1 LIQUID ORAL at 06:22

## 2019-01-01 RX ADMIN — LEVETIRACETAM 500 MILLIGRAM(S): 250 TABLET, FILM COATED ORAL at 06:21

## 2019-01-01 RX ADMIN — Medication 40 MILLIGRAM(S): at 06:20

## 2019-01-01 RX ADMIN — Medication 1000 UNIT(S): at 12:06

## 2019-01-01 RX ADMIN — POLYETHYLENE GLYCOL 3350 17 GRAM(S): 17 POWDER, FOR SOLUTION ORAL at 22:39

## 2019-01-01 RX ADMIN — Medication 81 MILLIGRAM(S): at 11:30

## 2019-01-01 RX ADMIN — LEVETIRACETAM 500 MILLIGRAM(S): 250 TABLET, FILM COATED ORAL at 05:06

## 2019-01-01 RX ADMIN — SODIUM CHLORIDE 250 MILLILITER(S): 9 INJECTION INTRAMUSCULAR; INTRAVENOUS; SUBCUTANEOUS at 04:43

## 2019-01-01 RX ADMIN — Medication 100 MILLIGRAM(S): at 17:38

## 2019-01-01 RX ADMIN — Medication 81 MILLIGRAM(S): at 10:00

## 2019-01-01 RX ADMIN — Medication 500 MILLIGRAM(S): at 08:58

## 2019-01-01 RX ADMIN — Medication 40 MILLIGRAM(S): at 16:00

## 2019-01-01 RX ADMIN — LEVETIRACETAM 500 MILLIGRAM(S): 250 TABLET, FILM COATED ORAL at 18:27

## 2019-01-01 RX ADMIN — Medication 20 MILLIGRAM(S): at 11:51

## 2019-01-01 RX ADMIN — HEPARIN SODIUM 5000 UNIT(S): 5000 INJECTION INTRAVENOUS; SUBCUTANEOUS at 11:16

## 2019-01-01 RX ADMIN — POLYETHYLENE GLYCOL 3350 17 GRAM(S): 17 POWDER, FOR SOLUTION ORAL at 08:58

## 2019-01-01 RX ADMIN — Medication 1 MILLIGRAM(S): at 11:16

## 2019-01-01 RX ADMIN — Medication 500 MILLIGRAM(S): at 11:43

## 2019-01-01 RX ADMIN — Medication 500 MILLIGRAM(S): at 10:01

## 2019-01-01 RX ADMIN — Medication 3 MILLILITER(S): at 08:13

## 2019-01-01 RX ADMIN — Medication 20 MILLIGRAM(S): at 05:57

## 2019-01-01 RX ADMIN — Medication 10 MILLIGRAM(S): at 04:37

## 2019-01-01 RX ADMIN — SENNA PLUS 2 TABLET(S): 8.6 TABLET ORAL at 22:49

## 2019-01-01 RX ADMIN — Medication 1 MILLIGRAM(S): at 08:58

## 2019-01-01 RX ADMIN — Medication 500 MILLIGRAM(S): at 12:24

## 2019-01-01 RX ADMIN — HEPARIN SODIUM 5000 UNIT(S): 5000 INJECTION INTRAVENOUS; SUBCUTANEOUS at 10:45

## 2019-01-01 RX ADMIN — HEPARIN SODIUM 5000 UNIT(S): 5000 INJECTION INTRAVENOUS; SUBCUTANEOUS at 13:12

## 2019-01-01 RX ADMIN — MUPIROCIN 1 APPLICATION(S): 20 OINTMENT TOPICAL at 18:29

## 2019-01-01 RX ADMIN — HEPARIN SODIUM 5000 UNIT(S): 5000 INJECTION INTRAVENOUS; SUBCUTANEOUS at 00:22

## 2019-01-01 RX ADMIN — Medication 20 MILLIEQUIVALENT(S): at 09:49

## 2019-01-01 RX ADMIN — Medication 1 MILLIGRAM(S): at 13:12

## 2019-01-01 RX ADMIN — Medication 100 MILLIGRAM(S): at 06:21

## 2019-01-01 RX ADMIN — LEVETIRACETAM 500 MILLIGRAM(S): 250 TABLET, FILM COATED ORAL at 06:17

## 2019-01-01 RX ADMIN — LEVETIRACETAM 500 MILLIGRAM(S): 250 TABLET, FILM COATED ORAL at 06:22

## 2019-01-01 RX ADMIN — HEPARIN SODIUM 5000 UNIT(S): 5000 INJECTION INTRAVENOUS; SUBCUTANEOUS at 05:57

## 2019-01-01 RX ADMIN — Medication 100 MILLIGRAM(S): at 17:42

## 2019-01-01 RX ADMIN — Medication 20 MILLIEQUIVALENT(S): at 11:50

## 2019-01-01 RX ADMIN — HEPARIN SODIUM 5000 UNIT(S): 5000 INJECTION INTRAVENOUS; SUBCUTANEOUS at 11:30

## 2019-01-01 RX ADMIN — Medication 81 MILLIGRAM(S): at 12:41

## 2019-01-01 RX ADMIN — Medication 1000 UNIT(S): at 08:58

## 2019-01-01 RX ADMIN — Medication 133 MILLILITER(S): at 09:48

## 2019-01-01 RX ADMIN — Medication 20 MILLIEQUIVALENT(S): at 12:07

## 2019-01-01 RX ADMIN — SENNA PLUS 2 TABLET(S): 8.6 TABLET ORAL at 23:01

## 2019-01-01 RX ADMIN — BENZOCAINE AND MENTHOL 1 LOZENGE: 5; 1 LIQUID ORAL at 06:17

## 2019-01-01 RX ADMIN — Medication 81 MILLIGRAM(S): at 12:17

## 2019-01-01 RX ADMIN — Medication 1 MILLIGRAM(S): at 12:17

## 2019-01-01 RX ADMIN — Medication 20 MILLIEQUIVALENT(S): at 12:17

## 2019-01-01 RX ADMIN — Medication 100 MILLIGRAM(S): at 18:08

## 2019-01-01 RX ADMIN — Medication 250 MILLIGRAM(S): at 17:28

## 2019-01-01 RX ADMIN — AZITHROMYCIN 500 MILLIGRAM(S): 500 TABLET, FILM COATED ORAL at 11:50

## 2019-01-01 RX ADMIN — LEVETIRACETAM 500 MILLIGRAM(S): 250 TABLET, FILM COATED ORAL at 06:06

## 2019-01-01 RX ADMIN — Medication 250 MILLIGRAM(S): at 14:35

## 2019-01-01 RX ADMIN — HEPARIN SODIUM 5000 UNIT(S): 5000 INJECTION INTRAVENOUS; SUBCUTANEOUS at 18:27

## 2019-01-01 RX ADMIN — Medication 50 MILLIGRAM(S): at 06:24

## 2019-01-01 RX ADMIN — Medication 20 MILLIGRAM(S): at 06:33

## 2019-01-01 RX ADMIN — Medication 250 MILLIGRAM(S): at 17:33

## 2019-01-01 RX ADMIN — Medication 3 MILLILITER(S): at 21:50

## 2019-01-01 RX ADMIN — Medication 500 MILLIGRAM(S): at 11:30

## 2019-01-01 RX ADMIN — HEPARIN SODIUM 5000 UNIT(S): 5000 INJECTION INTRAVENOUS; SUBCUTANEOUS at 23:01

## 2019-01-01 RX ADMIN — Medication 40 MILLIGRAM(S): at 21:58

## 2019-01-01 RX ADMIN — Medication 10 MILLIEQUIVALENT(S): at 13:12

## 2019-01-01 RX ADMIN — Medication 250 MILLIGRAM(S): at 05:06

## 2019-01-01 RX ADMIN — Medication 1 MILLIGRAM(S): at 12:07

## 2019-01-01 RX ADMIN — Medication 1000 UNIT(S): at 12:18

## 2019-01-01 RX ADMIN — MUPIROCIN 1 APPLICATION(S): 20 OINTMENT TOPICAL at 06:21

## 2019-01-01 RX ADMIN — Medication 25 MILLIGRAM(S): at 13:19

## 2019-01-01 RX ADMIN — CEFTRIAXONE 100 MILLIGRAM(S): 500 INJECTION, POWDER, FOR SOLUTION INTRAMUSCULAR; INTRAVENOUS at 08:34

## 2019-01-01 RX ADMIN — Medication 50 MILLIGRAM(S): at 06:22

## 2019-01-01 RX ADMIN — Medication 1000 UNIT(S): at 13:12

## 2019-01-01 RX ADMIN — Medication 0.12 MILLIGRAM(S): at 03:05

## 2019-01-01 RX ADMIN — Medication 250 MILLIGRAM(S): at 17:06

## 2019-01-01 RX ADMIN — Medication 250 MILLIGRAM(S): at 06:24

## 2019-01-01 RX ADMIN — Medication 50 MILLIGRAM(S): at 06:30

## 2019-01-01 RX ADMIN — HEPARIN SODIUM 5000 UNIT(S): 5000 INJECTION INTRAVENOUS; SUBCUTANEOUS at 22:49

## 2019-01-01 RX ADMIN — Medication 1000 UNIT(S): at 08:33

## 2019-01-01 RX ADMIN — HEPARIN SODIUM 5000 UNIT(S): 5000 INJECTION INTRAVENOUS; SUBCUTANEOUS at 09:48

## 2019-01-01 RX ADMIN — Medication 40 MILLIGRAM(S): at 16:01

## 2019-01-01 RX ADMIN — Medication 10 MILLIGRAM(S): at 16:22

## 2019-01-01 RX ADMIN — Medication 100 MILLIGRAM(S): at 06:17

## 2019-01-01 RX ADMIN — LEVETIRACETAM 500 MILLIGRAM(S): 250 TABLET, FILM COATED ORAL at 05:36

## 2019-01-01 RX ADMIN — Medication 250 MILLIGRAM(S): at 17:14

## 2019-01-01 RX ADMIN — Medication 81 MILLIGRAM(S): at 13:12

## 2019-01-01 RX ADMIN — MUPIROCIN 1 APPLICATION(S): 20 OINTMENT TOPICAL at 22:04

## 2019-01-01 RX ADMIN — HEPARIN SODIUM 5000 UNIT(S): 5000 INJECTION INTRAVENOUS; SUBCUTANEOUS at 09:46

## 2019-01-01 RX ADMIN — HEPARIN SODIUM 5000 UNIT(S): 5000 INJECTION INTRAVENOUS; SUBCUTANEOUS at 10:01

## 2019-01-01 RX ADMIN — Medication 10 MILLIEQUIVALENT(S): at 10:00

## 2019-01-01 RX ADMIN — ONDANSETRON 4 MILLIGRAM(S): 8 TABLET, FILM COATED ORAL at 07:58

## 2019-01-01 RX ADMIN — Medication 40 MILLIGRAM(S): at 05:35

## 2019-01-01 RX ADMIN — ATORVASTATIN CALCIUM 20 MILLIGRAM(S): 80 TABLET, FILM COATED ORAL at 23:01

## 2019-01-01 RX ADMIN — Medication 250 MILLIGRAM(S): at 09:47

## 2019-01-01 RX ADMIN — Medication 1 MILLIGRAM(S): at 12:24

## 2019-01-01 RX ADMIN — Medication 1000 UNIT(S): at 17:06

## 2019-01-01 RX ADMIN — LEVETIRACETAM 500 MILLIGRAM(S): 250 TABLET, FILM COATED ORAL at 06:11

## 2019-01-01 RX ADMIN — Medication 10 MILLIEQUIVALENT(S): at 11:43

## 2019-01-01 RX ADMIN — Medication 10 MILLIEQUIVALENT(S): at 14:05

## 2019-01-01 RX ADMIN — Medication 25 MILLIGRAM(S): at 16:12

## 2019-01-01 RX ADMIN — Medication 10 MILLIGRAM(S): at 22:25

## 2019-01-01 RX ADMIN — Medication 1 MILLIGRAM(S): at 10:00

## 2019-01-01 RX ADMIN — Medication 0.5 MILLIGRAM(S): at 19:05

## 2019-01-01 RX ADMIN — SENNA PLUS 2 TABLET(S): 8.6 TABLET ORAL at 22:05

## 2019-01-01 RX ADMIN — Medication 50 MILLIGRAM(S): at 05:36

## 2019-01-01 RX ADMIN — Medication 25 MILLIGRAM(S): at 00:22

## 2019-01-01 RX ADMIN — POLYETHYLENE GLYCOL 3350 17 GRAM(S): 17 POWDER, FOR SOLUTION ORAL at 09:53

## 2019-01-01 RX ADMIN — Medication 0.12 MILLIGRAM(S): at 09:46

## 2019-01-01 RX ADMIN — Medication 250 MILLIGRAM(S): at 17:38

## 2019-01-01 RX ADMIN — Medication 3 MILLILITER(S): at 08:42

## 2019-01-01 RX ADMIN — SENNA PLUS 2 TABLET(S): 8.6 TABLET ORAL at 22:42

## 2019-01-01 RX ADMIN — HEPARIN SODIUM 5000 UNIT(S): 5000 INJECTION INTRAVENOUS; SUBCUTANEOUS at 12:18

## 2019-01-01 RX ADMIN — Medication 100 MILLIGRAM(S): at 07:13

## 2019-01-01 RX ADMIN — Medication 40 MILLIGRAM(S): at 17:13

## 2019-01-01 RX ADMIN — Medication 81 MILLIGRAM(S): at 11:51

## 2019-01-01 RX ADMIN — Medication 81 MILLIGRAM(S): at 11:43

## 2019-01-01 RX ADMIN — Medication 81 MILLIGRAM(S): at 11:15

## 2019-01-01 RX ADMIN — HEPARIN SODIUM 5000 UNIT(S): 5000 INJECTION INTRAVENOUS; SUBCUTANEOUS at 11:43

## 2019-01-01 RX ADMIN — Medication 3 MILLILITER(S): at 03:40

## 2019-01-01 RX ADMIN — Medication 1000 UNIT(S): at 10:00

## 2019-01-01 RX ADMIN — Medication 40 MILLIGRAM(S): at 13:49

## 2019-01-01 RX ADMIN — HEPARIN SODIUM 5000 UNIT(S): 5000 INJECTION INTRAVENOUS; SUBCUTANEOUS at 08:59

## 2019-02-01 ENCOUNTER — NON-APPOINTMENT (OUTPATIENT)
Age: 84
End: 2019-02-01

## 2019-02-01 ENCOUNTER — APPOINTMENT (OUTPATIENT)
Dept: CARDIOLOGY | Facility: CLINIC | Age: 84
End: 2019-02-01
Payer: MEDICARE

## 2019-02-01 VITALS
HEART RATE: 60 BPM | BODY MASS INDEX: 27.48 KG/M2 | HEIGHT: 60 IN | SYSTOLIC BLOOD PRESSURE: 136 MMHG | RESPIRATION RATE: 12 BRPM | DIASTOLIC BLOOD PRESSURE: 84 MMHG | WEIGHT: 140 LBS

## 2019-02-01 PROCEDURE — 93000 ELECTROCARDIOGRAM COMPLETE: CPT

## 2019-02-01 PROCEDURE — 99214 OFFICE O/P EST MOD 30 MIN: CPT

## 2019-02-01 NOTE — PHYSICAL EXAM
[Normal Conjunctiva] : the conjunctiva exhibited no abnormalities [Eyelids - No Xanthelasma] : the eyelids demonstrated no xanthelasmas [Normal Oral Mucosa] : normal oral mucosa [No Oral Pallor] : no oral pallor [No Oral Cyanosis] : no oral cyanosis [Normal Jugular Venous A Waves Present] : normal jugular venous A waves present [Normal Jugular Venous V Waves Present] : normal jugular venous V waves present [No Jugular Venous Rendon A Waves] : no jugular venous rendon A waves [Abdomen Soft] : soft [Abdomen Tenderness] : non-tender [Abdomen Mass (___ Cm)] : no abdominal mass palpated [Nail Clubbing] : no clubbing of the fingernails [Cyanosis, Localized] : no localized cyanosis [Petechial Hemorrhages (___cm)] : no petechial hemorrhages [] : no ischemic changes [FreeTextEntry1] : Petechial-like rash in the left lower face and neck [Oriented To Time, Place, And Person] : oriented to person, place, and time [Affect] : the affect was normal [Mood] : the mood was normal [No Anxiety] : not feeling anxious

## 2019-02-01 NOTE — ASSESSMENT
[FreeTextEntry1] : EKG shows likely DDD paced rhythm at a rate of 60;\par \par Summary the patient is an 83-year-old with history of TAVR aortic valve replacement and prior coronary stents, paroxysmal atrial fibrillation and permanent pacemaker;\par She has had a stable cardiac panel;\par \par Plan:\par \par No additional cardiac workup indicated at this time;\par \par Recommend recheck pacemaker interrogation by next visit within 3 months\par \par Patient to report any untoward chest symptoms between then and now;

## 2019-02-01 NOTE — HISTORY OF PRESENT ILLNESS
[FreeTextEntry1] : She is tolerating her current medical regimen without difficulty\par \par Her last dual-chamber pacemaker interrogation was this past fall and battery was good but there was a slight "oversensing" problem and a slight adjustment was made;\par ;

## 2019-02-01 NOTE — REASON FOR VISIT
[Follow-Up - Clinic] : a clinic follow-up of [FreeTextEntry1] : The patient is a 83-year-old white female who presents back to the office today for general cardiac checkup with known history of aortic stenosis status post stent valve replacement and coronary stents dating back to 2016. She also has paroxysmal atrial fibrillation and permanent pacemaker;\par \par The patient presents back to the office today for general cardiac checkup;\par \par She reports that she manages to continue to ambulate with a cane and walker but denies any significant chest pain, shortness of breath, palpitations or dizziness;

## 2019-02-07 ENCOUNTER — APPOINTMENT (OUTPATIENT)
Dept: UROGYNECOLOGY | Facility: CLINIC | Age: 84
End: 2019-02-07
Payer: MEDICARE

## 2019-02-07 VITALS — DIASTOLIC BLOOD PRESSURE: 78 MMHG | SYSTOLIC BLOOD PRESSURE: 138 MMHG | HEART RATE: 59 BPM

## 2019-02-07 PROCEDURE — 99213 OFFICE O/P EST LOW 20 MIN: CPT

## 2019-02-07 PROCEDURE — 51798 US URINE CAPACITY MEASURE: CPT

## 2019-04-19 ENCOUNTER — APPOINTMENT (OUTPATIENT)
Dept: CARDIOLOGY | Facility: CLINIC | Age: 84
End: 2019-04-19
Payer: MEDICARE

## 2019-04-19 ENCOUNTER — NON-APPOINTMENT (OUTPATIENT)
Age: 84
End: 2019-04-19

## 2019-04-19 VITALS
BODY MASS INDEX: 28.27 KG/M2 | HEIGHT: 60 IN | RESPIRATION RATE: 12 BRPM | WEIGHT: 144 LBS | SYSTOLIC BLOOD PRESSURE: 167 MMHG | DIASTOLIC BLOOD PRESSURE: 87 MMHG | HEART RATE: 68 BPM

## 2019-04-19 DIAGNOSIS — I10 ESSENTIAL (PRIMARY) HYPERTENSION: ICD-10-CM

## 2019-04-19 PROCEDURE — 93288 INTERROG EVL PM/LDLS PM IP: CPT

## 2019-04-19 PROCEDURE — 99214 OFFICE O/P EST MOD 30 MIN: CPT | Mod: 25

## 2019-04-19 PROCEDURE — 93000 ELECTROCARDIOGRAM COMPLETE: CPT | Mod: 59

## 2019-04-19 RX ORDER — ATORVASTATIN CALCIUM 20 MG/1
20 TABLET, FILM COATED ORAL
Qty: 90 | Refills: 3 | Status: ACTIVE | COMMUNITY
Start: 2019-04-19

## 2019-04-19 NOTE — REASON FOR VISIT
[FreeTextEntry1] : The patient is a 84-year-old white female who presents back to the office today for general cardiac checkup with known history of aortic stenosis status post stent valve replacement and coronary stents dating back to 2016. She also has paroxysmal atrial fibrillation and permanent pacemaker.  Mrs. Ravi reports feeing overall well and without cardiac complaints.  She denies CP, SOB, FOREMAN, PND, orthopnea, palpitations, presyncope, syncope.

## 2019-04-19 NOTE — DISCUSSION/SUMMARY
[FreeTextEntry1] : 1).  Echocardiogram and Carotid Doppler to be completed prior to next office visit to assess valvulopathy / cardiac function as well as carotid plaquing.  \par \par 2).  Follow up with PCP at her assisted living facility (Dr. Kessler) regarding routine checkups and blood work, have copy faxed to our office. \par \par 3).  Diet and lifestyle modification discussed including low fat and low carbohydrate diet.\par \par 4).  Recommend patient report any untoward symptoms. \par \par 5).  Follow up with our [place] office (Jose Ryder) regarding device interrogation as directed.\par \par 6).  Follow up with our office in 3 to 4 months or PRN.

## 2019-04-19 NOTE — ASSESSMENT
[FreeTextEntry1] : EKG 4/19/2019:  The EKG illustrates AV dual paced rhythm with prolongation of AV conduction, rate of 60.\par \par Dual Chamber Pacemaker interrogation (4/19/2019):  There were 40 mode switches for AF recorded since last interrogation.  However, the recordings were noted to appear to be intermittently oversensing R-waves in the atrial channel.\par \par Blood work (3/29/2019):  Potassium (4.2), Sodium (139), BUN (27), creatinine (1.1), Cholesterol (145), LDL (72), Triglycerides (123), HDL (48).

## 2019-04-19 NOTE — PHYSICAL EXAM
[General Appearance - Well Developed] : well developed [Normal Appearance] : normal appearance [General Appearance - Well Nourished] : well nourished [Normal Oral Mucosa] : normal oral mucosa [Normal Conjunctiva] : the conjunctiva exhibited no abnormalities [General Appearance - In No Acute Distress] : no acute distress [Normal Oropharynx] : normal oropharynx [No Jugular Venous Rendon A Waves] : no jugular venous rendon A waves [Normal Jugular Venous A Waves Present] : normal jugular venous A waves present [Normal Jugular Venous V Waves Present] : normal jugular venous V waves present [] : no respiratory distress [Auscultation Breath Sounds / Voice Sounds] : lungs were clear to auscultation bilaterally [Heart Rate And Rhythm] : heart rate and rhythm were normal [Edema] : no peripheral edema present [Heart Sounds] : normal S1 and S2 [Bowel Sounds] : normal bowel sounds [Abnormal Walk] : normal gait [Nail Clubbing] : no clubbing of the fingernails [Cyanosis, Localized] : no localized cyanosis [Oriented To Time, Place, And Person] : oriented to person, place, and time [Skin Color & Pigmentation] : normal skin color and pigmentation [FreeTextEntry1] : Grade I/VI systolic murmur

## 2019-04-19 NOTE — HISTORY OF PRESENT ILLNESS
[FreeTextEntry1] : Apparently, her assisted living physician recently started her back on Synthroid 50 mcg daily due to an elevated TSH (14.09).\par \par Patient is tolerating cardiac medications without negative side effects including Metoprolol 50 mg QD, Atorvastatin 20 mg, ASA 81 mg QD.\par \par Once again, her dual-chamber pacemaker interrogation today (4/19/19) illustrated there was a slight "oversensing" problem but no adjustment was made at this visit. She will follow up with a subsequent device interrogation in 3 to 4 months.

## 2019-05-09 PROBLEM — R39.9 UTI SYMPTOMS: Status: ACTIVE | Noted: 2019-01-01

## 2019-09-03 PROBLEM — R53.82 CHRONIC FATIGUE: Status: ACTIVE | Noted: 2019-01-01

## 2019-09-03 PROBLEM — I65.29 CAROTID ARTERY PLAQUE: Status: ACTIVE | Noted: 2019-04-19

## 2019-09-03 NOTE — HISTORY OF PRESENT ILLNESS
[FreeTextEntry1] : Patient has not been a candidate for anticoagulation for atrial fibrillation because of previous falling episodes and intracerebral bleed with fractures of her cervical spine as well;\par \par Interrogation of her pacemaker today shows that she seems to be more often now in atrial fibrillation and is becoming more persistent/chronic;

## 2019-09-03 NOTE — REVIEW OF SYSTEMS
[Feeling Fatigued] : feeling fatigued [Joint Pain] : joint pain [Dyspnea on exertion] : dyspnea during exertion [Joint Stiffness] : joint stiffness [Dizziness] : dizziness [Negative] : Heme/Lymph

## 2019-09-03 NOTE — REASON FOR VISIT
[Follow-Up - Clinic] : a clinic follow-up of [FreeTextEntry1] : The patient is an 84-year-old white female with a known history of severe aortic stenosis status post TAVR/stent valve replacement and history of coronary stents (dating back to 2016) patient also has paroxysmal atrial fibrillation and permanent pacemaker;\par \par She is currently residing at the [facility] living [place] ([place]), and states she has had some excessive fatigue lately despite going on thyroid medication for hypothyroid.;\par \par She states she has interrupted sleeping at night waking up frequently to urinate which could be contributing to some of her fatigue;\par \par There has been no chest pain, palpitations with dizziness but she does have an imbalance with walking and has a history of some exertional dyspnea due to some underlying COPD/bronchitis history;

## 2019-09-03 NOTE — ASSESSMENT
[FreeTextEntry1] : EKG demonstrates likely atrial fibrillation with a moderate ventricular rate and rhythm ventricular paced beats;\par \par Recent carotid duplex study demonstrating bilateral heterogeneous plaquing which was mild without any significant obstructive flow\par \par Recent transthoracic echocardiogram (8/2/19 demonstrating severe left atrial enlargement, preserved LV size and systolic function with normal ejection fraction 60-65%, normally functioning bioprosthetic aortic valve with trace trivial paravalvular leak. Mild to moderate TR mildly elevated PA pressures no pericardial effusion;\par \par \par In summary the patient is an 84-year-old woman with a history of mild carotid plaquing stenosis, severe aortic stenosis status post stent valve replacement and coronary artery disease with prior history of coronary stents as well with paroxysmal/persistent atrial fibrillation and permanent pacemaker;\par \par Plan:\par \par Discuss with patient increase risk of going on anticoagulation for atrial fibrillation and will continue with enteric-coated aspirin for now\par \par Recommend check CBC, comprehensive medical panel and lipid profile in the near future\par \par Followup within 3 months for checkup and pacemaker interrogation for A. fib burden\par \par Continue other medications the same at this time\par \par Modest walking exercise as tolerated;\par \par ;;;;

## 2019-09-03 NOTE — PHYSICAL EXAM
[Normal Conjunctiva] : the conjunctiva exhibited no abnormalities [Eyelids - No Xanthelasma] : the eyelids demonstrated no xanthelasmas [No Oral Pallor] : no oral pallor [Normal Oral Mucosa] : normal oral mucosa [No Oral Cyanosis] : no oral cyanosis [Normal Jugular Venous A Waves Present] : normal jugular venous A waves present [Normal Jugular Venous V Waves Present] : normal jugular venous V waves present [No Jugular Venous Rendon A Waves] : no jugular venous rendon A waves [Abdomen Tenderness] : non-tender [Abdomen Soft] : soft [Abdomen Mass (___ Cm)] : no abdominal mass palpated [Nail Clubbing] : no clubbing of the fingernails [Cyanosis, Localized] : no localized cyanosis [] : no ischemic changes [Petechial Hemorrhages (___cm)] : no petechial hemorrhages [Oriented To Time, Place, And Person] : oriented to person, place, and time [Affect] : the affect was normal [Mood] : the mood was normal [No Anxiety] : not feeling anxious [FreeTextEntry1] : Pallor;

## 2019-12-10 NOTE — ED ADULT NURSE NOTE - NSIMPLEMENTINTERV_GEN_ALL_ED
Implemented All Fall Risk Interventions:  [place] to call system. Call bell, personal items and telephone within reach. Instruct patient to call for assistance. Room bathroom lighting operational. Non-slip footwear when patient is off stretcher. Physically safe environment: no spills, clutter or unnecessary equipment. Stretcher in lowest position, wheels locked, appropriate side rails in place. Provide visual cue, wrist band, yellow gown, etc. Monitor gait and stability. Monitor for mental status changes and reorient to person, place, and time. Review medications for side effects contributing to fall risk. Reinforce activity limits and safety measures with patient and family.

## 2019-12-10 NOTE — ED ADULT NURSE REASSESSMENT NOTE - NS ED NURSE REASSESS COMMENT FT1
Pt going to ED [facility] at this time with transport staff on ventimask as per MD - no RN needed to accompany pt.

## 2019-12-10 NOTE — ED ADULT NURSE NOTE - OBJECTIVE STATEMENT
pt presents to the ed a&ox3 c/o palpitations and sob that began yesterday night, b/l pitting edema to lower extremities x2 weeks, pt states she stopped her water pill when put on antibiotic 2 wks ago for bronchitis.

## 2019-12-10 NOTE — ED ADULT NURSE REASSESSMENT NOTE - NS ED NURSE REASSESS COMMENT FT1
Pt back safely from ED [facility], back on cardiac monitor and vevntimask. Call bell within reach. Awaiting results, pt aware of plan of care. No acute pain or distress noted.

## 2019-12-10 NOTE — H&P ADULT - HISTORY OF PRESENT ILLNESS
83yo F with pmh of AS s/p TAVR, CAD s/p stents, paroxysmal atrial fibrillation, PPM, UTIs presented from [place] c/o increased b/l LE swelling with associated fatigue and decreased ambulation x 1.5 weeks. Pt states that she has not taken her lasix b/l she is unable to ambulate independently and unable to make to the bathroom since she has increasing sob as well as sob. Pt sees Dr. Melendez and as per chart review pt saw him on in 9/19 had PPM Interrogation which showed pt more often atrial fibrillation and more persistent. Pt also c/o nonspecific lower abdominal quadrant pain and dysuria, denies fever, chills, palpitations, focal weakness. In the ED tachycardia 122, EKG showed afib rvr, pt placed on venti mask for increased work of breathing with improvement, received lasix 40mg IV x 1dose, Cardizem 10mg IV push and Metroprolol 50mg x 1 dose with improved rate and good urine output. 85yo F with pmh of AS s/p TAVR, CAD s/p stents, paroxysmal atrial fibrillation, PPM, UTIs presented from [place] c/o increased b/l LE swelling with associated fatigue and decreased ambulation x 1.5 weeks. Pt states that she has not taken her lasix b/l she is unable to ambulate independently and unable to make to the bathroom since she has increasing sob as well as sob. Pt sees Dr. Melendez and as per chart review pt saw him on in 9/19 had PPM Interrogation which showed persistent atrial fibrillation. Pt also c/o nonspecific lower abdominal quadrant pain and dysuria, denies fever, chills, palpitations, focal weakness. In the ED tachycardia 122, EKG showed afib rvr, pt placed on venti mask for increased work of breathing with improvement, received lasix 40mg IV x 1dose, Cardizem 10mg IV push and Metroprolol 50mg x 1 dose with improved rate and good urine output.

## 2019-12-10 NOTE — H&P ADULT - NSICDXPASTMEDICALHX_GEN_ALL_CORE_FT
PAST MEDICAL HISTORY:  Aortic stenosis     CHF exacerbation     Hyperlipidemia     PAF (paroxysmal atrial fibrillation)     Skin cancer

## 2019-12-10 NOTE — ED PROVIDER NOTE - NS ED ROS FT
Review of Systems:  	•	CONSTITUTIONAL - no fever, no diaphoresis, no weight change  	•	SKIN - no rash  	•	HEMATOLOGIC - no bleeding, no bruising  	•	EYES - no eye pain, no blurred vision  	•	ENT - no change in hearing, no pain  	•	RESPIRATORY - +shortness of breath, no cough  	•	CARDIAC - no chest pain, no palpitations, +LE edema  	•	GI - no abd pain, no nausea, no vomiting, no diarrhea, no constipation, no bleeding  	•	GENITO-URINARY - no vaginal bleeding, no discharge, no dysuria; no hematuria  	•	ENDO - no polydypsia, no polyurea, no heat/no cold intolerance  	•	MUSCULOSKELETAL - no joint paint, no swelling, no redness  	•	NEUROLOGIC - + weakness, no headache, no anesthesia, no paresthesias  	•	PSYCH - no anxiety, non suicidal, non homicidal, no hallucination, no depression  	•	ALLERGY - no rhinitis

## 2019-12-10 NOTE — ED PROVIDER NOTE - OBJECTIVE STATEMENT
85yo female with pmh of afib on ac, HTN, HLD, diastolic HF presents with LE edema. Pt states for the past 2 weeks with increasing b/l LE edema, now making it difficult for her to walk. PT also states she was told her HR was fast, but denies palpitations. Pt reports mild sob. Pt denies fevers/chills, ha, loc, focal neuro deficits, chest pain, cough, abd pain/n/v/d, urinary symptoms.

## 2019-12-10 NOTE — H&P ADULT - PROBLEM SELECTOR PROBLEM 3
Hyperlipidemia, unspecified hyperlipidemia type Urinary tract infection without hematuria, site unspecified

## 2019-12-10 NOTE — H&P ADULT - ASSESSMENT
83yo F with pmh of AS s/p TAVR, CAD s/p stents, paroxysmal atrial fibrillation(no AC due to fall), PPM, UTIs, ICH presented from [place] c/o increased b/l LE swelling with associated fatigue and decreased ambulation x 1.5 weeks admitted for decompensated HF. 85yo F with pmh of AS s/p TAVR, CAD s/p stents, paroxysmal atrial fibrillation(no AC due to fall), PPM, UTIs, ICH presented from [place] c/o increased b/l LE swelling with associated fatigue and decreased ambulation x 1.5 weeks admitted for decompensated HF, afib avr, UTI. 83yo F with pmh of AS s/p TAVR, CAD s/p stents, paroxysmal atrial fibrillation(no AC due to fall), PPM, UTIs, ICH presented from [place] c/o increased b/l LE swelling with associated fatigue and decreased ambulation x 1.5 weeks admitted for decompensated HF, afib avr, suspected UTI.

## 2019-12-10 NOTE — ED ADULT TRIAGE NOTE - CHIEF COMPLAINT QUOTE
Patient BIBA, sent from the [facility] for new onset a-fib, patient in no apparent distress at this time, denies chestpain, swelling to ankles bilateral

## 2019-12-10 NOTE — H&P ADULT - NSHPPHYSICALEXAM_GEN_ALL_CORE
T(C): 37.7 (12-11-19 @ 02:26), Max: 37.7 (12-11-19 @ 02:26)  HR: 102 (12-11-19 @ 02:26) (89 - 121)  BP: 151/86 (12-11-19 @ 02:26) (116/77 - 165/126)  RR: 22 (12-11-19 @ 02:26) (18 - 22)  SpO2: 92% (12-11-19 @ 02:26) (92% - 96%)    GENERAL: on venti mask   EYES: sclera clear, no exudates  ENMT: oropharynx clear without erythema, no exudates, moist mucous membranes  NECK: supple, soft, no thyromegaly noted  LUNGS: scattered rhonchi, rales appreciated  HEART: soft S1/S2, regular rate and rhythm, no murmurs noted, +2 pitting lower extremity edema  GASTROINTESTINAL: abdomen is soft, nontender, nondistended, normoactive bowel sounds, no palpable masses  INTEGUMENT: good skin turgor, warm skin, appears well perfused  MUSCULOSKELETAL: no clubbing or cyanosis, no obvious deformity  NEUROLOGIC: awake, alert, oriented x3, good muscle tone in 4 extremities, no obvious sensory deficits  PSYCHIATRIC: mood is good, affect is congruent, linear and logical thought process  HEME/LYMPH: no palpable supraclavicular nodules, no obvious ecchymosis or petechiae

## 2019-12-10 NOTE — ED PROVIDER NOTE - PHYSICAL EXAMINATION
Const: Awake, alert and oriented. In no acute distress. Well appearing.  HEENT: NC/AT. Moist mucous membranes.  Eyes: No scleral icterus. EOMI.  Neck:. Soft and supple. Full ROM without pain.  Cardiac: tachy irregular. +S1/S2. Peripheral pulses 2+ and symmetric. 3+ bilateral LE edema  Resp: Speaking in full sentences. No evidence of respiratory distress. decreased BS at the bases  Abd: Soft, non-tender, non-distended. Normal bowel sounds in all 4 quadrants. No guarding or rebound.  Back: Spine midline and non-tender. No CVAT.  Skin: No rashes, abrasions or lacerations.  Lymph: No cervical lymphadenopathy.  Neuro: Awake, alert & oriented x 3. Moves all extremities symmetrically.

## 2019-12-10 NOTE — H&P ADULT - PROBLEM SELECTOR PROBLEM 4
Aortic valve stenosis, etiology of cardiac valve disease unspecified Hyperlipidemia, unspecified hyperlipidemia type

## 2019-12-10 NOTE — H&P ADULT - PROBLEM SELECTOR PLAN 2
EKG with afib rvr   s/p cardizem 10mg IV push and metoprolol 50mg po with good rate control   cont with metoprolol 50mg daily  f/u further cardio recs   not on AC due to fall and hx of ICH, cont asa

## 2019-12-10 NOTE — H&P ADULT - NSHPSOCIALHISTORY_GEN_ALL_CORE
former smoker   Denies etoh use   Amrit substance abuse   lives at [facility]   ambulate with rolling waker

## 2019-12-10 NOTE — ED PROVIDER NOTE - CLINICAL SUMMARY MEDICAL DECISION MAKING FREE TEXT BOX
83yo female with pmh of afib on ac, HTN, HLD, diastolic HF presents with LE edema - pt with afib with rvr initially was able to convert to normal rate, pt also with chf exacerbation requiring supp O2, no DVT on US, admit for further management

## 2019-12-10 NOTE — ED ADULT NURSE REASSESSMENT NOTE - NS ED NURSE REASSESS COMMENT FT1
Pt placed on bedpan for BM, pt unable to go -states "its only gas" Repositioned pt for comfort,. On cardiac monitor. Awaiting bed assignment on 4T, ventimask on and 02 sat WNL. Perma cath on. Call bell within reach.

## 2019-12-10 NOTE — H&P ADULT - NSICDXPASTSURGICALHX_GEN_ALL_CORE_FT
PAST SURGICAL HISTORY:  Bilateral cataracts     Pacemaker     S/P TAVR (transcatheter aortic valve replacement)

## 2019-12-10 NOTE — ED ADULT NURSE NOTE - CHIEF COMPLAINT QUOTE
Patient BIBA, sent from the [facility] for new onset a-fib, patient in no apparent distress at this time, denies chestpain, swelling to ankles bilateral

## 2019-12-10 NOTE — H&P ADULT - PROBLEM SELECTOR PLAN 1
due to medication noncompliance  LE edema, cxr with mild vascular congestion  s/p IV lasix 40mg x 1 in the Ed with good urine output   cont with lasix 40mg BID   daily weight, strict I/O  unknown EF, no previous TTE in the chart  cardio consult placed with Dr. Al [facility]

## 2019-12-10 NOTE — H&P ADULT - PROBLEM SELECTOR PLAN 3
cont with atorvastatin 20mg daily nonspecific lower quadrant abdominal pain   hx of recurrent UTIs   afebrile, mild leukocytosis, UA dirt  started on ceftriaxone  pending urine culture

## 2019-12-10 NOTE — ED PROVIDER NOTE - CHIEF COMPLAINT
The patient is a 84y Female complaining of palpitations.
General Sunscreen Counseling: I recommended a broad spectrum sunscreen with a SPF of 30 or higher.  I explained that SPF 30 sunscreens block approximately 97 percent of the sun's harmful rays.  Sunscreens should be applied at least 15 minutes prior to expected sun exposure and then every 2 hours after that as long as sun exposure continues. If swimming or exercising sunscreen should be reapplied every 45 minutes to an hour after getting wet or sweating.  One ounce, or the equivalent of a shot glass full of sunscreen, is adequate to protect the skin not covered by a bathing suit. I also recommended a lip balm with a sunscreen as well. Sun protective clothing can be used in lieu of sunscreen but must be worn the entire time you are exposed to the sun's rays.
Detail Level: Zone

## 2019-12-11 NOTE — PROGRESS NOTE ADULT - SUBJECTIVE AND OBJECTIVE BOX
Internal Medicine Hospitalist Progress Note    follow up for SOB , CHF   pt is seen earlier today , feels better   on oxygen via VM   no distress awake alert oriented     Chart reviewed                   ROS: as above, all remaining ROS are negative.       BACKGROUND:  MEDICATIONS  (STANDING):  ascorbic acid 500 milliGRAM(s) Oral daily  aspirin  chewable 81 milliGRAM(s) Oral daily  atorvastatin 20 milliGRAM(s) Oral at bedtime  budesonide 160 MICROgram(s)/formoterol 4.5 MICROgram(s) Inhaler 2 Puff(s) Inhalation two times a day  cefTRIAXone   IVPB      cholecalciferol 1000 Unit(s) Oral daily  folic acid 1 milliGRAM(s) Oral daily  furosemide   Injectable 40 milliGRAM(s) IV Push two times a day  heparin  Injectable 5000 Unit(s) SubCutaneous every 12 hours  levETIRAcetam 500 milliGRAM(s) Oral two times a day  metoprolol succinate ER 50 milliGRAM(s) Oral daily    MEDICATIONS  (PRN):    Allergies    No Known Allergies    Intolerances            VITALS:  Vital Signs Last 24 Hrs  T(C): 36.9 (11 Dec 2019 10:15), Max: 37.7 (11 Dec 2019 02:26)  T(F): 98.4 (11 Dec 2019 10:15), Max: 99.9 (11 Dec 2019 02:26)  HR: 95 (11 Dec 2019 10:15) (80 - 121)  BP: 134/78 (11 Dec 2019 10:15) (116/77 - 165/126)  BP(mean): 90 (11 Dec 2019 00:57) (90 - 141)  RR: 20 (11 Dec 2019 10:15) (18 - 22)  SpO2: 97% (11 Dec 2019 10:15) (92% - 100%) Daily Height in cm: 152.4 (10 Dec 2019 16:13)    Daily   CAPILLARY BLOOD GLUCOSE        I&O's Summary      PHYSICAL EXAM:      Constitutional: awake alert no resp distress     Respiratory: bibasilar rales     Cardiovascular: regular S1 /s2 ,     Gastrointestinal: soft no tenderness , BS positive     Extremities: no pretibial edema         LABS:                        13.9   11.96 )-----------( 274      ( 11 Dec 2019 10:27 )             43.1     12-11    139  |  95<L>  |  16.0  ----------------------------<  121<H>  3.2<L>   |  26.0  |  0.84    Ca    8.8      11 Dec 2019 10:27  Mg     1.7     12-11    TPro  8.3  /  Alb  3.0<L>  /  TBili  0.8  /  DBili  x   /  AST  41<H>  /  ALT  25  /  AlkPhos  90  12-10    PT/INR - ( 10 Dec 2019 17:36 )   PT: 15.1 sec;   INR: 1.30 ratio         PTT - ( 10 Dec 2019 17:36 )  PTT:30.2 sec    Radiology :    < from: Xray Chest 1 View-PORTABLE IMMEDIATE (12.10.19 @ 18:46) >  Right AICD reidentified in situ. No change heart mediastinum. Bilateral   diffuse nonspecific groundglass attenuation likely congestive. Clinically   for concomitant infection. No pleural effusion or pneumothorax.    Impression: Pulmonary congestion as described     Result communicated to ED via PACS discrepancy work flow.        < end of copied text >

## 2019-12-11 NOTE — CONSULT NOTE ADULT - SUBJECTIVE AND OBJECTIVE BOX
VERENICE HUDSON  [id number]        HPI:  83yo F PMHx AS s/p TAVR, CAD s/p stents, paroxysmal atrial fibrillation s/p PPM, UTIs presented from [place] c/o increased b/l LE swelling with associated fatigue and decreased ambulation x 1.5 weeks. Pt states that she has not taken her lasix b/l she is unable to ambulate independently and unable to make to the bathroom since she has increasing sob as well as sob. Pt sees Dr. Melendez and as per chart review pt saw him on in 9/19 had PPM Interrogation which showed pt more often atrial fibrillation and more persistent. Pt also c/o nonspecific lower abdominal quadrant pain and dysuria, denies fever, chills, palpitations, focal weakness. In the ED tachycardia 122, EKG showed afib rvr, pt placed on venti mask for increased work of breathing with improvement, received lasix 40mg IV x 1dose, Cardizem 10mg IV push and Metroprolol 50mg x 1 dose with improved rate and good urine output. (10 Dec 2019 23:54)        ALLERGIES:  No Known Allergies      PAST MEDICAL & SURGICAL HISTORY:  CHF exacerbation  PAF (paroxysmal atrial fibrillation)  Skin cancer  Hyperlipidemia  Aortic stenosis  Bilateral cataracts  S/P TAVR (transcatheter aortic valve replacement)  Pacemaker    Otherwise, as noted above    MEDICATIONS (HOME):  ascorbic acid 500 milliGRAM(s) Oral daily  aspirin  chewable 81 milliGRAM(s) Oral daily  atorvastatin 20 milliGRAM(s) Oral at bedtime  budesonide 160 MICROgram(s)/formoterol 4.5 MICROgram(s) Inhaler 2 Puff(s) Inhalation two times a day  cefTRIAXone   IVPB      cholecalciferol 1000 Unit(s) Oral daily  folic acid 1 milliGRAM(s) Oral daily  furosemide   Injectable 40 milliGRAM(s) IV Push two times a day  heparin  Injectable 5000 Unit(s) SubCutaneous every 12 hours  levETIRAcetam 500 milliGRAM(s) Oral two times a day  metoprolol succinate ER 50 milliGRAM(s) Oral daily      SOCIAL HISTORY:  Patient denies alcohol, tobacco, drug use    FAMILY HISTORY:  No pertinent family history in first degree relatives      ROS:  Patient denies cough, and other than noted above full ROS is unremarkable      PHYSICAL EXAM:  Vital Signs Last 24 Hrs  T(C): 37.7 (11 Dec 2019 02:26), Max: 37.7 (11 Dec 2019 02:26)  T(F): 99.9 (11 Dec 2019 02:26), Max: 99.9 (11 Dec 2019 02:26)  HR: 80 (11 Dec 2019 08:38) (80 - 121)  BP: 151/86 (11 Dec 2019 02:26) (116/77 - 165/126)  BP(mean): 90 (11 Dec 2019 00:57) (90 - 141)  RR: 22 (11 Dec 2019 02:26) (18 - 22)  SpO2: 100% (11 Dec 2019 08:38) (92% - 100%)  General: Patient comfortable in NAD  HEENT: NCAT, mmm, EOMI  Neck: no JVD, no carotid bruits  CVS: nl s1, split s2, no s3, no s4, no murmur or rubs, RRR  Chest: CTA b/l  Abdomen: soft, nt/nd  Extremities: No c/c/e  Neuro: A&O x3  Psych: Normal affect      ECG:      LABS:                        13.7   10.89 )-----------( 318      ( 10 Dec 2019 17:36 )             42.5     12-10    135  |  95<L>  |  17.0  ----------------------------<  93  3.9   |  23.0  |  0.82    Ca    9.1      10 Dec 2019 17:36  Mg     1.8     12-10    TPro  8.3  /  Alb  3.0<L>  /  TBili  0.8  /  DBili  x   /  AST  41<H>  /  ALT  25  /  AlkPhos  90  12-10    CARDIAC MARKERS ( 10 Dec 2019 17:36 )  x     / <0.01 ng/mL / x     / x     / x          PT/INR - ( 10 Dec 2019 17:36 )   PT: 15.1 sec;   INR: 1.30 ratio         PTT - ( 10 Dec 2019 17:36 )  PTT:30.2 sec      RADIOLOGY:        Assessment:  84yFemale with PMHx p/w    Plan:  1. VERENICE HUDSON  [id number]        HPI:  83yo F PMHx AS s/p TAVR, CAD s/p stents, paroxysmal atrial fibrillation s/p PPM, COPD, UTIs presented from [place] c/o increased b/l LE swelling with associated fatigue and decreased ambulation along with SOB for last 2 weeks. Pt states that she has not taken her lasix b/c she is unable to ambulate independently and unable to make to the bathroom and since she has increasing sob as well as edema.  Patient saw her PCP 2 w/a when this started and was given abx but continued to feel worse.  She is concerned the abx made her feel worse.  Swelling has gotten worse, as has SOB.  Denies cough, f/c.  Denies orthopnea.  No CP/palps.      ALLERGIES:  No Known Allergies      PAST MEDICAL & SURGICAL HISTORY:  Skin cancer  Hyperlipidemia  Bilateral cataracts  Otherwise, as noted above      MEDICATIONS (HOME):  · 	aspirin 81 mg oral tablet, chewable: Last Dose Taken:  , 1 tab(s) orally once a day  · 	metoprolol succinate 50 mg oral tablet, extended release: Last Dose Taken:  , 1 tab(s) orally once a day  · 	ascorbic acid 500 mg oral tablet: Last Dose Taken:  , 1 tab(s) orally once a day  · 	folic acid 0.8 mg oral tablet: Last Dose Taken:  , 1 tab(s) orally once a day  · 	Vitamin D3 1000 intl units oral tablet: Last Dose Taken:  , 1 tab(s) orally once a day  · 	atorvastatin 20 mg oral tablet: Last Dose Taken:  , 1 tab(s) orally once a day  · 	Lasix 20 mg oral tablet: Last Dose Taken:  , 1 tab(s) orally once a day  · 	Keppra 500 mg oral tablet: Last Dose Taken:  , 1 tab(s) orally 2 times a day  · 	Breo Ellipta 200 mcg-25 mcg/inh inhalation powder: Last Dose Taken:  , 1 puff(s) inhaled once a day        SOCIAL HISTORY:  Patient denies alcohol, drug use.  Former smoker.    FAMILY HISTORY:  No pertinent CV family history in first degree relatives      ROS:  Patient denies cough, and other than noted above full ROS is unremarkable      PHYSICAL EXAM:  Vital Signs Last 24 Hrs  T(C): 37.7 (11 Dec 2019 02:26), Max: 37.7 (11 Dec 2019 02:26)  T(F): 99.9 (11 Dec 2019 02:26), Max: 99.9 (11 Dec 2019 02:26)  HR: 80 (11 Dec 2019 08:38) (80 - 121)  BP: 151/86 (11 Dec 2019 02:26) (116/77 - 165/126)  BP(mean): 90 (11 Dec 2019 00:57) (90 - 141)  RR: 22 (11 Dec 2019 02:26) (18 - 22)  SpO2: 100% (11 Dec 2019 08:38) (92% - 100%)  General: Patient mildly dyspneic on FM  HEENT: NCAT, mmm, EOMI  Neck: Mild JVD, no carotid bruits  CVS: nl s1, split s2, no s3, +ONDINA, irreg  Chest: Crackles and wheezing diffusely b/l  Abdomen: soft, nt/nd  Extremities: 1-2+ b/l LE edema  Neuro: A&O x3  Psych: Normal affect      ECG:  AF with RVR, with nonspec ST-T changes      LABS:                        13.7   10.89 )-----------( 318      ( 10 Dec 2019 17:36 )             42.5     12-10    135  |  95<L>  |  17.0  ----------------------------<  93  3.9   |  23.0  |  0.82    Ca    9.1      10 Dec 2019 17:36  Mg     1.8     12-10    TPro  8.3  /  Alb  3.0<L>  /  TBili  0.8  /  DBili  x   /  AST  41<H>  /  ALT  25  /  AlkPhos  90  12-10    CARDIAC MARKERS ( 10 Dec 2019 17:36 )  x     / <0.01 ng/mL / x     / x     / x          PT/INR - ( 10 Dec 2019 17:36 )   PT: 15.1 sec;   INR: 1.30 ratio         PTT - ( 10 Dec 2019 17:36 )  PTT:30.2 sec      RADIOLOGY:  Pending      Assessment:  83yo F PMHx AS s/p TAVR, CAD s/p stents, paroxysmal atrial fibrillation s/p PPM, COPD, UTIs presented from [place] c/o increased b/l LE swelling with associated fatigue and decreased ambulation along with SOB for last 2 weeks. Patient with edema and JVD and crackles one xam along with elevated BNP c/w acute CHF.  EF previosuly normal.  Will need diuresis.  Likely 2/2 noncompliance with Lasix.  Also ?underlying respiratory infection.    Plan:  1. Tele  2. Check echo.  3. Lasix IV BID.  4. Continue ASA/BB/statin.  5. O2 as needed.  6. Pulm eval.  Check CXR.    Will follow.  Thanks! VERENICE HUDSON  [id number]        HPI:  83yo F PMHx AS s/p TAVR, CAD s/p stents, paroxysmal atrial fibrillation s/p PPM, COPD, UTIs presented from [place] c/o increased b/l LE swelling with associated fatigue and decreased ambulation along with SOB for last 2 weeks. Pt states that she has not taken her lasix b/c she is unable to ambulate independently and unable to make to the bathroom and since she has increasing sob as well as edema.  Patient saw her PCP 2 w/a when this started and was given abx but continued to feel worse.  She is concerned the abx made her feel worse.  Swelling has gotten worse, as has SOB.  Denies cough, f/c.  Denies orthopnea.  No CP/palps.      ALLERGIES:  No Known Allergies      PAST MEDICAL & SURGICAL HISTORY:  Skin cancer  Hyperlipidemia  Bilateral cataracts  Otherwise, as noted above      MEDICATIONS (HOME):  · 	aspirin 81 mg oral tablet, chewable: Last Dose Taken:  , 1 tab(s) orally once a day  · 	metoprolol succinate 50 mg oral tablet, extended release: Last Dose Taken:  , 1 tab(s) orally once a day  · 	ascorbic acid 500 mg oral tablet: Last Dose Taken:  , 1 tab(s) orally once a day  · 	folic acid 0.8 mg oral tablet: Last Dose Taken:  , 1 tab(s) orally once a day  · 	Vitamin D3 1000 intl units oral tablet: Last Dose Taken:  , 1 tab(s) orally once a day  · 	atorvastatin 20 mg oral tablet: Last Dose Taken:  , 1 tab(s) orally once a day  · 	Lasix 20 mg oral tablet: Last Dose Taken:  , 1 tab(s) orally once a day  · 	Keppra 500 mg oral tablet: Last Dose Taken:  , 1 tab(s) orally 2 times a day  · 	Breo Ellipta 200 mcg-25 mcg/inh inhalation powder: Last Dose Taken:  , 1 puff(s) inhaled once a day        SOCIAL HISTORY:  Patient denies alcohol, drug use.  Former smoker.    FAMILY HISTORY:  No pertinent CV family history in first degree relatives      ROS:  Patient denies cough, and other than noted above full ROS is unremarkable      PHYSICAL EXAM:  Vital Signs Last 24 Hrs  T(C): 37.7 (11 Dec 2019 02:26), Max: 37.7 (11 Dec 2019 02:26)  T(F): 99.9 (11 Dec 2019 02:26), Max: 99.9 (11 Dec 2019 02:26)  HR: 80 (11 Dec 2019 08:38) (80 - 121)  BP: 151/86 (11 Dec 2019 02:26) (116/77 - 165/126)  BP(mean): 90 (11 Dec 2019 00:57) (90 - 141)  RR: 22 (11 Dec 2019 02:26) (18 - 22)  SpO2: 100% (11 Dec 2019 08:38) (92% - 100%)  General: Patient mildly dyspneic on FM  HEENT: NCAT, mmm, EOMI  Neck: Mild JVD, no carotid bruits  CVS: nl s1, split s2, no s3, +ONDINA, irreg  Chest: Crackles and wheezing diffusely b/l  Abdomen: soft, nt/nd  Extremities: 1-2+ b/l LE edema  Neuro: A&O x3  Psych: Normal affect      ECG:  AF with RVR, with nonspec ST-T changes      LABS:                        13.7   10.89 )-----------( 318      ( 10 Dec 2019 17:36 )             42.5     12-10    135  |  95<L>  |  17.0  ----------------------------<  93  3.9   |  23.0  |  0.82    Ca    9.1      10 Dec 2019 17:36  Mg     1.8     12-10    TPro  8.3  /  Alb  3.0<L>  /  TBili  0.8  /  DBili  x   /  AST  41<H>  /  ALT  25  /  AlkPhos  90  12-10    CARDIAC MARKERS ( 10 Dec 2019 17:36 )  x     / <0.01 ng/mL / x     / x     / x          PT/INR - ( 10 Dec 2019 17:36 )   PT: 15.1 sec;   INR: 1.30 ratio         PTT - ( 10 Dec 2019 17:36 )  PTT:30.2 sec      RADIOLOGY:  Pending      Assessment:  83yo F PMHx AS s/p TAVR, CAD s/p stents, paroxysmal atrial fibrillation s/p PPM, COPD, UTIs presented from [place] c/o increased b/l LE swelling with associated fatigue and decreased ambulation along with SOB for last 2 weeks. Patient with edema and JVD and crackles one xam along with elevated BNP c/w acute CHF.  EF previosuly normal.  Will need diuresis.  Likely 2/2 noncompliance with Lasix.  Also ?underlying respiratory infection.  AF rates at admit 2/2 SOB and CHF, improving.  Not on A/C 2/2 fall risk.    Plan:  1. Tele  2. Check echo.  3. Lasix IV BID.  4. Continue ASA/BB/statin.  5. Rate control AF.  6. Will continue off A/C.  Has been off 2/2 fall risk.  7. O2 as needed.  8. Pulm eval.  Check CXR.    Will follow.  Thanks!

## 2019-12-11 NOTE — ED ADULT NURSE REASSESSMENT NOTE - NS ED NURSE REASSESS COMMENT FT1
Pt care assumed 1930, report received from offgoing RN. Pt is resting in bed comfortably at this time, no apparent distress noted at this time. pt safety maintained. Pt denies any complaints at this time. pt educated on plan of care, plan of care taught back to RN. plan of care education deemed proficient through successful teach back. will continue to reeducate pt regarding plan of care.

## 2019-12-11 NOTE — PROGRESS NOTE ADULT - ASSESSMENT
83yo F with AS s/p TAVR, CAD s/p stents, paroxysmal atrial fibrillation s/p PPM, COPD presented from [place] c/o increased b/l LE swelling with associated fatigue and decreased ambulation along with SOB for last 2 weeks. she was found to be in atrial fib       1- CHF acute oncronic   cardiology consult appreciated   cont diuretics   TTE   2- Atrial fib with RVR   cont metoprolol 50 daily   not on AC due to fall risk and h/o intracranial bleed     3- possible UTI on rocephin iv   cx result is pending     4- h/o COPD - add short course of steroid   ox as needed     5- Hypokalemia replace po K ordered   monitor on diuretics

## 2019-12-12 NOTE — PHYSICAL THERAPY INITIAL EVALUATION ADULT - GENERAL OBSERVATIONS, REHAB EVAL
Pt received semi bejarano position in bed, (+) primafit, (+) cardiac monitor, (+) supplemental O2 via NC at 3L/min, (+) IV Left UE, NAD, agreeable to PT

## 2019-12-12 NOTE — PROGRESS NOTE ADULT - ASSESSMENT
83yo F with AS s/p TAVR, CAD s/p stents, paroxysmal atrial fibrillation s/p PPM, COPD presented from [place] c/o increased b/l LE swelling with associated fatigue and decreased ambulation along with SOB for last 2 weeks. she was found to be in atrial fib       1- CHF acute oncronic   cardiology consult appreciated   cont diuretics   TTE   2- Atrial fib with RVR   cont metoprolol 50 daily   not on AC due to fall risk and h/o intracranial bleed     3- sepsis possible UTI cont iv rocephin   cx result reviewed     4- h/o COPD - add short course of steroid   ox as needed

## 2019-12-12 NOTE — CHART NOTE - NSCHARTNOTEFT_GEN_A_CORE
SEPSIS FOLLOW UP NOTE    Patient seen and examined at bedside. RR called @ 4:15 for code sepsis. Pt reassessed around 6:15. Patient was sleeping comfortably with ventimask on. Upon waking, patient denied any complaints.     Vital Signs Last 24 Hrs  T(C): 36.3 (12 Dec 2019 05:26), Max: 36.9 (11 Dec 2019 10:15)  T(F): 97.4 (12 Dec 2019 05:26), Max: 98.4 (11 Dec 2019 10:15)  HR: 99 (12 Dec 2019 05:26) (66 - 109)  BP: 98/62 (12 Dec 2019 05:26) (91/65 - 134/78)  BP(mean): 73 (12 Dec 2019 04:45) (73 - 73)  RR: 20 (12 Dec 2019 05:26) (18 - 21)  SpO2: 98% (12 Dec 2019 05:) (95% - 100%) on 40% ventimask    PHYSICAL EXAM:  GENERAL: NAD, lying in bed comfortably  CHEST/LUNG: Clear to auscultation bilaterally  HEART: Regular rate and rhythm; No murmurs, rubs, or gallops  EXTREMITIES:  2+ Peripheral Pulses, brisk capillary refill      LABS:                        13.7   11.62 )-----------( 326      ( 12 Dec 2019 04:20 )             42.2       12    140  |  96<L>  |  22.0<H>  ----------------------------<  156<H>  4.1   |  27.0  |  1.03    Ca    8.9      12 Dec 2019 04:20  Mg     1.9     12-12    TPro  8.1  /  Alb  2.9<L>  /  TBili  0.7  /  DBili  x   /  AST  30  /  ALT  20  /  AlkPhos  85  12-12      LIVER FUNCTIONS - ( 12 Dec 2019 04:20 )  Alb: 2.9 g/dL / Pro: 8.1 g/dL / ALK PHOS: 85 U/L / ALT: 20 U/L / AST: 30 U/L / GGT: x             PT/INR - ( 12 Dec 2019 04:20 )   PT: 14.7 sec;   INR: 1.27 ratio         PTT - ( 12 Dec 2019 04:20 )  PTT:29.0 sec    Urinalysis Basic - ( 11 Dec 2019 00:25 )    Color: Yellow / Appearance: Slightly Turbid / S.010 / pH: x  Gluc: x / Ketone: Negative  / Bili: Negative / Urobili: Negative   Blood: x / Protein: 30 mg/dL / Nitrite: Positive   Leuk Esterase: Moderate / RBC: 3-5 /HPF / WBC >50   Sq Epi: x / Non Sq Epi: Few / Bacteria: Few      ASSESSMENT/ PLAN:  s/p Rapid Response for Code Sepsis.   Temperature, blood pressure, and HR improved.  Patient is already on Rocephin for UTI- will continue and evaluate need for additional coverage pending cultures  Continue supplemental oxygen  Repeat lactate ordered  Will Continue to monitor    DISPOSITION:  - Maintain current level of care

## 2019-12-12 NOTE — SEPSIS NOTE - ASSESSMENT
CXR- showed increased markings RLL   Lactate-2.2-gave 250ml bolus due to history of CHF  Blood cultures pending  Patient is already on Rocephin for UTI- will continue and evaluate need for additional coverage pending cultures  Cardizem 10mg given for afib-RVR  Continue supplemental oxygen  Repeat lactate ordered  Will Continue to monitor

## 2019-12-12 NOTE — PROGRESS NOTE ADULT - SUBJECTIVE AND OBJECTIVE BOX
VERENICE HUDSON  [id number]      Chief Complaint:  Acute CHF/AF    Interval History:  Patient feels better, less SOB and edema.  Denies CP/palps.    Tele:  AF, rate controlled      albuterol/ipratropium for Nebulization 3 milliLiter(s) Nebulizer every 6 hours  ascorbic acid 500 milliGRAM(s) Oral daily  aspirin  chewable 81 milliGRAM(s) Oral daily  atorvastatin 20 milliGRAM(s) Oral at bedtime  azithromycin   Tablet 500 milliGRAM(s) Oral daily  cefTRIAXone   IVPB 1000 milliGRAM(s) IV Intermittent every 24 hours  cefTRIAXone   IVPB      cholecalciferol 1000 Unit(s) Oral daily  folic acid 1 milliGRAM(s) Oral daily  heparin  Injectable 5000 Unit(s) SubCutaneous every 12 hours  influenza   Vaccine 0.5 milliLiter(s) IntraMuscular once  lactated ringers. 500 milliLiter(s) IV Continuous <Continuous>  levETIRAcetam 500 milliGRAM(s) Oral two times a day  methylPREDNISolone sodium succinate Injectable 40 milliGRAM(s) IV Push every 12 hours  metoprolol succinate ER 50 milliGRAM(s) Oral daily  potassium chloride    Tablet ER 20 milliEquivalent(s) Oral daily          Physical Exam:  T(C): 36.8 (12-12-19 @ 07:15), Max: 36.8 (12-12-19 @ 07:15)  HR: 83 (12-12-19 @ 08:45) (66 - 109)  BP: 97/60 (12-12-19 @ 07:15) (91/65 - 131/78)  RR: 20 (12-12-19 @ 05:26) (18 - 21)  SpO2: 99% (12-12-19 @ 08:45) (95% - 99%)  Wt(kg): --  General: Comfortable in NAD  Neck: No JVD  CVS: nl s1s2, no s3, +ONDINA, irreg  Pulm: CTA b/l, diminished at bases  Abd: soft, non-tender  Ext: Tr-1+ LE edema b/l  Neuro A&O x3  Psych: Normal affect      Labs:   12 Dec 2019 04:20    140    |  96     |  22.0   ----------------------------<  156    4.1     |  27.0   |  1.03     Ca    8.9        12 Dec 2019 04:20  Mg     1.9       12 Dec 2019 08:34    TPro  8.1    /  Alb  2.9    /  TBili  0.7    /  DBili  x      /  AST  30     /  ALT  20     /  AlkPhos  85     12 Dec 2019 04:20                          13.7   11.62 )-----------( 326      ( 12 Dec 2019 04:20 )             42.2     PT/INR - ( 12 Dec 2019 04:20 )   PT: 14.7 sec;   INR: 1.27 ratio         PTT - ( 12 Dec 2019 04:20 )  PTT:29.0 sec  CARDIAC MARKERS ( 10 Dec 2019 17:36 )  x     / <0.01 ng/mL / x     / x     / x          CXR:  Pulmonary congestion        Assessment:  83yo F PMHx AS s/p TAVR, CAD s/p stents, paroxysmal atrial fibrillation s/p PPM, COPD, UTIs presented from [place] c/o increased b/l LE swelling with associated fatigue and decreased ambulation along with SOB for last 2 weeks. Patient with edema and JVD and crackles one exam along with elevated BNP and CXR c/w acute CHF.  EF previously normal.  Will need diuresis.  Likely 2/2 noncompliance with Lasix.  Also ?underlying respiratory infection.  AF rates at admit 2/2 SOB and CHF, improving.  Not on A/C 2/2 fall risk.  -Improving with diuretics.    Plan:  1. Tele  2. F/u echo.  3. Lasix IV BID for another day.  4. Continue ASA/BB/statin.  5. Rate control AF.  6. Will continue off A/C.  Has been off 2/2 fall risk.  7. Pulm f/u. VERENICE HUDSON  [id number]      Chief Complaint:  Acute CHF/AF    Interval History:  Patient feels better, less SOB and edema.  Denies CP/palps.    Tele:  AF, rate controlled      albuterol/ipratropium for Nebulization 3 milliLiter(s) Nebulizer every 6 hours  ascorbic acid 500 milliGRAM(s) Oral daily  aspirin  chewable 81 milliGRAM(s) Oral daily  atorvastatin 20 milliGRAM(s) Oral at bedtime  azithromycin   Tablet 500 milliGRAM(s) Oral daily  cefTRIAXone   IVPB 1000 milliGRAM(s) IV Intermittent every 24 hours  cefTRIAXone   IVPB      cholecalciferol 1000 Unit(s) Oral daily  folic acid 1 milliGRAM(s) Oral daily  heparin  Injectable 5000 Unit(s) SubCutaneous every 12 hours  influenza   Vaccine 0.5 milliLiter(s) IntraMuscular once  lactated ringers. 500 milliLiter(s) IV Continuous <Continuous>  levETIRAcetam 500 milliGRAM(s) Oral two times a day  methylPREDNISolone sodium succinate Injectable 40 milliGRAM(s) IV Push every 12 hours  metoprolol succinate ER 50 milliGRAM(s) Oral daily  potassium chloride    Tablet ER 20 milliEquivalent(s) Oral daily          Physical Exam:  T(C): 36.8 (12-12-19 @ 07:15), Max: 36.8 (12-12-19 @ 07:15)  HR: 83 (12-12-19 @ 08:45) (66 - 109)  BP: 97/60 (12-12-19 @ 07:15) (91/65 - 131/78)  RR: 20 (12-12-19 @ 05:26) (18 - 21)  SpO2: 99% (12-12-19 @ 08:45) (95% - 99%)  Wt(kg): --  General: Comfortable in NAD  Neck: No JVD  CVS: nl s1s2, no s3, +ONDINA, irreg  Pulm: CTA b/l, diminished at bases  Abd: soft, non-tender  Ext: Tr-1+ LE edema b/l  Neuro A&O x3  Psych: Normal affect      Labs:   12 Dec 2019 04:20    140    |  96     |  22.0   ----------------------------<  156    4.1     |  27.0   |  1.03     Ca    8.9        12 Dec 2019 04:20  Mg     1.9       12 Dec 2019 08:34    TPro  8.1    /  Alb  2.9    /  TBili  0.7    /  DBili  x      /  AST  30     /  ALT  20     /  AlkPhos  85     12 Dec 2019 04:20                          13.7   11.62 )-----------( 326      ( 12 Dec 2019 04:20 )             42.2     PT/INR - ( 12 Dec 2019 04:20 )   PT: 14.7 sec;   INR: 1.27 ratio         PTT - ( 12 Dec 2019 04:20 )  PTT:29.0 sec  CARDIAC MARKERS ( 10 Dec 2019 17:36 )  x     / <0.01 ng/mL / x     / x     / x          CXR:  Pulmonary congestion        Assessment:  83yo F PMHx AS s/p TAVR, CAD s/p stents, paroxysmal atrial fibrillation s/p PPM, COPD, UTIs presented from [place] c/o increased b/l LE swelling with associated fatigue and decreased ambulation along with SOB for last 2 weeks. Patient with edema and JVD and crackles one exam along with elevated BNP and CXR c/w acute CHF.  EF previously normal.  Will need diuresis.  Likely 2/2 noncompliance with Lasix.  Also ?underlying respiratory infection.  AF rates at admit 2/2 SOB and CHF, improving.  Not on A/C 2/2 fall risk.  -Improving with diuretics, now with some hypotension.    Plan:  1. Tele  2. F/u echo.  3. Lasix to po with hypotension.  Would not hold completely.  4. Continue ASA/BB/statin.  5. Rate control AF.  6. Will continue off A/C.  Has been off 2/2 fall risk.  7. Pulm f/u.

## 2019-12-12 NOTE — PHYSICAL THERAPY INITIAL EVALUATION ADULT - GAIT DISTANCE, PT EVAL
Addended by: MENDY GAVIRIA on: 5/28/2019 03:46 PM     Modules accepted: Orders     80 feet x2 with standing rest breaks

## 2019-12-12 NOTE — PROGRESS NOTE ADULT - SUBJECTIVE AND OBJECTIVE BOX
Internal Medicine Hospitalist Progress Note    follow up for SOB , CHF   pt is seen earlier today , feels better   overnight events reported     on oxygen via NC     Vital Signs Last 24 Hrs  T(C): 36.4 (12 Dec 2019 19:00), Max: 36.9 (12 Dec 2019 12:18)  T(F): 97.6 (12 Dec 2019 19:00), Max: 98.4 (12 Dec 2019 12:18)  HR: 90 (12 Dec 2019 19:00) (66 - 109)  BP: 104/73 (12 Dec 2019 19:00) (91/65 - 114/78)  BP(mean): 73 (12 Dec 2019 04:45) (73 - 73)  RR: 18 (12 Dec 2019 19:00) (18 - 21)  SpO2: 99% (12 Dec 2019 19:00) (95% - 99%)    Constitutional: awake alert no resp distress     Respiratory: bibasilar rales , rare rhonchi     Cardiovascular: regular S1 /s2 ,     Gastrointestinal: soft no tenderness , BS positive     Extremities: no pretibial edema                             13.7   11.62 )-----------( 326      ( 12 Dec 2019 04:20 )             42.2   12-12    140  |  96<L>  |  22.0<H>  ----------------------------<  156<H>  4.1   |  27.0  |  1.03    Ca    8.9      12 Dec 2019 04:20  Mg     1.9     12-12    TPro  8.1  /  Alb  2.9<L>  /  TBili  0.7  /  DBili  x   /  AST  30  /  ALT  20  /  AlkPhos  85  12-12

## 2019-12-13 NOTE — PROGRESS NOTE ADULT - ASSESSMENT
83yo F with AS s/p TAVR, CAD s/p stents, paroxysmal atrial fibrillation s/p PPM, COPD presented from [place] c/o increased b/l LE swelling with associated fatigue and decreased ambulation along with SOB for last 2 weeks. she was found to be in atrial fib on admission , spiked fever , UA positive started on empirical iv rocephin day 1 , cx resulted MRSA positive ID called to evaluate       1- CHF acute on cronic   cardiology follow up appreciated   opn lasix , change to po , BP is low   monitor     2- MRSA positive UTI / sepsis resolving   ID called to evaluate for abx coverage   will give dose of vanco x1     3- Atrial fib with RVR - improving   cont metoprolol   not on AC due to fall risk and h/o intracranial bleed     4- h/o COPD - cont neb   oxygen as needed   incentive spirometry   5- Hypokalemia   replaced   repeat  lytes in am     out of bed   PT as tolerate

## 2019-12-13 NOTE — CONSULT NOTE ADULT - SUBJECTIVE AND OBJECTIVE BOX
INFECTIOUS DISEASES AND INTERNAL MEDICINE at [place]  =======================================================  Mason Goodman MD  Diplomates American Board of Internal Medicine and Infectious Diseases  Telephone [phone]  Fax            [phone]  =======================================================    VERENICE [id number]      HPI:  83yo F with pmh of AS s/p TAVR, CAD s/p stents, paroxysmal atrial fibrillation, PPM, UTIs presented from [facility] c/o increased b/l LE swelling with associated fatigue and decreased ambulation x 1.5 weeks. Pt states that she has not taken her lasix b/l she is unable to ambulate independently and unable to make to the bathroom since she has increasing sob as well as sob. Pt sees Dr. Melendez and as per chart review pt saw him on in 9/19 had PPM Interrogation which showed persistent atrial fibrillation. Pt also c/o nonspecific lower abdominal quadrant pain and dysuria, denies fever, chills, palpitations, focal weakness. In the ED tachycardia 122, EKG showed afib rvr, pt placed on venti mask for increased work of breathing with improvement, received lasix 40mg IV x 1dose, Cardizem 10mg IV push and Metroprolol 50mg x 1 dose with improved rate and good urine output.    URINE CX POS  [facility] AURUES MRSA  ASKED TO EVALUATE FROM ID STANDPOINT       PAST MEDICAL & SURGICAL HISTORY:  CHF exacerbation  PAF (paroxysmal atrial fibrillation)  Skin cancer  Hyperlipidemia  Aortic stenosis  Bilateral cataracts  S/P TAVR (transcatheter aortic valve replacement)  Pacemaker      ANTIBIOTICS  cefTRIAXone   IVPB 1000 milliGRAM(s) IV Intermittent every 24 hours  cefTRIAXone   IVPB          Allergies    No Known Allergies    Intolerances        SOCIAL HISTORY:     FAMILY HX   FAMILY HISTORY:  No pertinent family history in first degree relatives      Vital Signs Last 24 Hrs  T(C): 36.4 (13 Dec 2019 11:03), Max: 36.4 (12 Dec 2019 15:17)  T(F): 97.5 (13 Dec 2019 11:03), Max: 97.6 (12 Dec 2019 19:00)  HR: 103 (13 Dec 2019 11:03) (77 - 103)  BP: 103/66 (13 Dec 2019 11:03) (98/58 - 109/69)  BP(mean): --  RR: 18 (13 Dec 2019 11:03) (18 - 19)  SpO2: 92% (13 Dec 2019 11:03) (92% - 99%)  Drug Dosing Weight  Height (cm): 152.4 (12 Dec 2019 22:05)  Weight (kg): 69.5 (12 Dec 2019 22:05)  BMI (kg/m2): 29.9 (12 Dec 2019 22:05)  BSA (m2): 1.67 (12 Dec 2019 22:05)      REVIEW OF SYSTEMS:    CONSTITUTIONAL:  As per HPI.    HEENT:  Eyes:  No diplopia or blurred vision. ENT:  No earache, sore throat or runny nose.    CARDIOVASCULAR:  No pressure, squeezing, strangling, tightness, heaviness or aching about the chest, neck, axilla or epigastrium.    RESPIRATORY:  No cough, shortness of breath, PND or orthopnea.    GASTROINTESTINAL:  No nausea, vomiting or diarrhea.    GENITOURINARY:  No dysuria, frequency or urgency.    MUSCULOSKELETAL:  As per HPI.    SKIN:  No change in skin, hair or nails.    NEUROLOGIC:  No paresthesias, fasciculations, seizures or weakness.                  PHYSICAL EXAMINATION:    GENERAL: The patient is a well-developed, well-nourished __ OOB TO CHAIR    VITAL SIGNS: T(C): 36.4 (12-13-19 @ 11:03), Max: 36.4 (12-12-19 @ 15:17)  HR: 103 (12-13-19 @ 11:03) (77 - 103)  BP: 103/66 (12-13-19 @ 11:03) (98/58 - 109/69)  RR: 18 (12-13-19 @ 11:03) (18 - 19)  SpO2: 92% (12-13-19 @ 11:03) (92% - 99%)  Wt(kg): --    HEENT: Head is normocephalic and atraumatic.  ANICTERIC  NECK: Supple. No carotid bruits.  No lymphadenopathy or thyromegaly.    LUNGS:COARSE BREATH SOUNDS    HEART: Regular rate and rhythm without murmur.    ABDOMEN: Soft, nontender, and nondistended.  Positive bowel sounds.  No hepatosplenomegaly was noted. NO REBOUND NO GUARDING    EXTREMITIES: NO EDEMA NO ERYTHEMA    NEUROLOGIC: NON FOCAL      SKIN: No ulceration or induration present. NO RASH        BLOOD CULTURES  Culture Results:   >100,000 CFU/ml Methicillin resistant Staphylococcus aureus  .  TYPE: (C=Critical, N=Notification, A=Abnormal) C  TESTS:  _ MRSA  DATE/TIME CALLED: _ 12/13/2019 09:31:00  CALLED TO: Tatiana Porras RN  READ BACK (2 Patient Identifiers)(Y/N): _ Y  READ BACK VALUES (Y/N): _ Y  CALLED BY: Tatiana Gregory    Sent copy to IC and logistic. (12-11 @ 07:28)       URINE CX          LABS:                        13.7   11.62 )-----------( 326      ( 12 Dec 2019 04:20 )             42.2     12-12    140  |  96<L>  |  22.0<H>  ----------------------------<  156<H>  4.1   |  27.0  |  1.03    Ca    8.9      12 Dec 2019 04:20  Mg     1.9     12-12    TPro  8.1  /  Alb  2.9<L>  /  TBili  0.7  /  DBili  x   /  AST  30  /  ALT  20  /  AlkPhos  85  12-12    PT/INR - ( 12 Dec 2019 04:20 )   PT: 14.7 sec;   INR: 1.27 ratio         PTT - ( 12 Dec 2019 04:20 )  PTT:29.0 sec      RADIOLOGY & ADDITIONAL STUDIES:      ASSESSMENT/PLAN      83yo F with pmh of AS s/p TAVR, CAD s/p stents, paroxysmal atrial fibrillation, PPM, UTIs presented from [facility] c/o increased b/l LE swelling with associated fatigue and decreased ambulation x 1.5 weeks. Pt states that she has not taken her lasix b/l she is unable to ambulate independently and unable to make to the bathroom since she has increasing sob as well as sob. Pt sees Dr. Melendez and as per chart review pt saw him on in 9/19 had PPM Interrogation which showed persistent atrial fibrillation. Pt also c/o nonspecific lower abdominal quadrant pain and dysuria, denies fever, chills, palpitations, focal weakness. In the ED tachycardia 122, EKG showed afib rvr, pt placed on venti mask for increased work of breathing with improvement, received lasix 40mg IV x 1dose, Cardizem 10mg IV push and Metroprolol 50mg x 1 dose with improved rate and good urine output.    URINE CX POS  [facility] AURUES MRSA  [facility] NOT  A COMMON ORGANISM IN URINE   WOULD AWAIT BLOOD CX  RESULTS WOULD SUGGEST ECHOCARDIOGRAM AND RENAL SONOGRAM CAN D/C ROCEPHIN CONINTUE IV VANCO  WILL FOLLOW UP IWTH FURTHER RECOMMENDATIONSS              ELE OLIVARES MD

## 2019-12-13 NOTE — PROGRESS NOTE ADULT - SUBJECTIVE AND OBJECTIVE BOX
Internal Medicine Hospitalist Progress Note    follow up for SOB , CHF , UTI   pt is evaluated in am , she is feeling better   neb therapy in progress   no overnight events reported     labs reviewed     Vital Signs Last 24 Hrs  T(C): 36.4 (13 Dec 2019 05:55), Max: 36.9 (12 Dec 2019 12:18)  T(F): 97.5 (13 Dec 2019 05:55), Max: 98.4 (12 Dec 2019 12:18)  HR: 98 (13 Dec 2019 08:50) (77 - 98)  BP: 105/68 (13 Dec 2019 08:50) (98/58 - 114/78)  BP(mean): --  RR: 18 (13 Dec 2019 08:50) (18 - 19)  SpO2: 96% (13 Dec 2019 08:50) (92% - 99%)    Constitutional: awake alert no respiratory  distress     Respiratory: bibasilar rales , otherwise CTA     Cardiovascular: regular rate rythm S1 /s2     Gastrointestinal: soft no tenderness , BS positive     Extremities: no pretibial edema     Skin : normal color no rash                           13.7   11.62 )-----------( 326      ( 12 Dec 2019 04:20 )             42.2   12-12    140  |  96<L>  |  22.0<H>  ----------------------------<  156<H>  4.1   |  27.0  |  1.03    Ca    8.9      12 Dec 2019 04:20  Mg     1.9     12-12    TPro  8.1  /  Alb  2.9<L>  /  TBili  0.7  /  DBili  x   /  AST  30  /  ALT  20  /  AlkPhos  85  12-12    Culture - Urine (12.11.19 @ 07:28)    -  Ampicillin/Sulbactam: R <=8/4    -  Cefazolin: R >16    -  Daptomycin: S <=0.5    -  Gentamicin: S <=4 Should not be used as monotherapy    -  Linezolid: S 2    -  Oxacillin: R >2    -  Penicillin: R 8    -  RIF- Rifampin: S <=1 Should not be used as monotherapy    -  Tetra/Doxy: S <=4    -  Trimethoprim/Sulfamethoxazole: S <=0.5/9.5    -  Vancomycin: S 1    Specimen Source: .Urine    Culture Results:   >100,000 CFU/ml Methicillin resistant Staphylococcus aureus  .  TYPE: (C=Critical, N=Notification, A=Abnormal) C  TESTS:  _ MRSA  DATE/TIME CALLED: _ 12/13/2019 09:31:00  CALLED TO: Tatiana Porras RN  READ BACK (2 Patient Identifiers)(Y/N): _ Y  READ BACK VALUES (Y/N): _ Y  CALLED BY: Tatiana Gregory    Sent copy to IC and logistic.    Organism Identification: Methicillin resistant Staphylococcus aureus    Organism: Methicillin resistant Staphylococcus aureus    Method Type: DILSHAD

## 2019-12-13 NOTE — PROGRESS NOTE ADULT - SUBJECTIVE AND OBJECTIVE BOX
VERENICE HUDSON  [id number]      Chief Complaint:  Acute CHF/AF    Interval History:  Patient feels better, less SOB.  Denies CP/palps.    Tele:  AF, rate controlled          albuterol/ipratropium for Nebulization 3 milliLiter(s) Nebulizer every 6 hours  ascorbic acid 500 milliGRAM(s) Oral daily  aspirin  chewable 81 milliGRAM(s) Oral daily  atorvastatin 20 milliGRAM(s) Oral at bedtime  azithromycin   Tablet 500 milliGRAM(s) Oral daily  benzocaine 15 mG/menthol 3.6 mG (Sugar-Free) Lozenge 1 Lozenge Oral every 4 hours PRN  cefTRIAXone   IVPB 1000 milliGRAM(s) IV Intermittent every 24 hours  cefTRIAXone   IVPB      cholecalciferol 1000 Unit(s) Oral daily  folic acid 1 milliGRAM(s) Oral daily  furosemide    Tablet 20 milliGRAM(s) Oral daily  heparin  Injectable 5000 Unit(s) SubCutaneous every 12 hours  influenza   Vaccine 0.5 milliLiter(s) IntraMuscular once  levETIRAcetam 500 milliGRAM(s) Oral two times a day  metoprolol succinate ER 50 milliGRAM(s) Oral daily  potassium chloride    Tablet ER 20 milliEquivalent(s) Oral daily          Physical Exam:  T(C): 36.4 (12-13-19 @ 05:55), Max: 36.9 (12-12-19 @ 12:18)  HR: 98 (12-13-19 @ 08:50) (77 - 98)  BP: 105/68 (12-13-19 @ 08:50) (98/58 - 114/78)  RR: 18 (12-13-19 @ 08:50) (18 - 19)  SpO2: 96% (12-13-19 @ 08:50) (92% - 99%)  Wt(kg): --  General: Comfortable in NAD  Neck: No JVD  CVS: nl s1s2, no s3, +ONDINA, irreg  Pulm: CTA b/l, diminished at bases  Abd: soft, non-tender  Ext: Tr LE edema b/l  Neuro A&O x3  Psych: Normal affect          Labs:   12 Dec 2019 04:20    140    |  96     |  22.0   ----------------------------<  156    4.1     |  27.0   |  1.03     Ca    8.9        12 Dec 2019 04:20  Mg     1.9       12 Dec 2019 08:34    TPro  8.1    /  Alb  2.9    /  TBili  0.7    /  DBili  x      /  AST  30     /  ALT  20     /  AlkPhos  85     12 Dec 2019 04:20                          13.7   11.62 )-----------( 326      ( 12 Dec 2019 04:20 )             42.2     PT/INR - ( 12 Dec 2019 04:20 )   PT: 14.7 sec;   INR: 1.27 ratio         PTT - ( 12 Dec 2019 04:20 )  PTT:29.0 sec          Assessment:  85yo F PMHx AS s/p TAVR, CAD s/p stents, paroxysmal atrial fibrillation s/p PPM, COPD, UTIs presented from [place] c/o increased b/l LE swelling with associated fatigue and decreased ambulation along with SOB for last 2 weeks. Patient with edema and JVD and crackles one exam along with elevated BNP and CXR c/w acute CHF.  EF previously normal.  Will need diuresis.  Likely 2/2 noncompliance with Lasix.  Also ?underlying respiratory infection.  AF rates at admit 2/2 SOB and CHF, improving.  Not on A/C 2/2 fall risk.  -Improving with diuretics, now with some hypotension.    Plan:  1. Tele  2. F/u echo.  3. Lasix to po with hypotension.  Would not hold completely.  4. Continue ASA/BB/statin.  5. Rate control AF.  6. Will continue off A/C.  Has been off 2/2 fall risk.  7. Pulm f/u.  8. Abx per med.  9. D/c planning.

## 2019-12-14 NOTE — DISCHARGE NOTE NURSING/CASE MANAGEMENT/SOCIAL WORK - PATIENT PORTAL LINK FT
You can access the FollowMyHealth Patient Portal offered by [facility] by registering at the following website: [email or web address]. By joining [employer]’s FollowMyHealth portal, you will also be able to view your health information using other applications (apps) compatible with our system.

## 2019-12-14 NOTE — PROGRESS NOTE ADULT - SUBJECTIVE AND OBJECTIVE BOX
INFECTIOUS DISEASES AND INTERNAL MEDICINE at [place]  =======================================================  Mason Goodman MD  Diplomates American Board of Internal Medicine and Infectious Diseases  Telephone [phone]  Fax            [phone]  =======================================================    VERENICE HUDSON [id number]    Follow up: UTI    Allergies:  CHOCOLATE ENSURE ONLY (Unknown)  No Known Allergies      Medications:  ascorbic acid 500 milliGRAM(s) Oral daily  aspirin  chewable 81 milliGRAM(s) Oral daily  atorvastatin 20 milliGRAM(s) Oral at bedtime  benzocaine 15 mG/menthol 3.6 mG (Sugar-Free) Lozenge 1 Lozenge Oral every 4 hours PRN  cholecalciferol 1000 Unit(s) Oral daily  folic acid 1 milliGRAM(s) Oral daily  furosemide    Tablet 20 milliGRAM(s) Oral daily  heparin  Injectable 5000 Unit(s) SubCutaneous every 12 hours  influenza   Vaccine 0.5 milliLiter(s) IntraMuscular once  levETIRAcetam 500 milliGRAM(s) Oral two times a day  metoprolol succinate ER 50 milliGRAM(s) Oral daily  potassium chloride    Tablet ER 20 milliEquivalent(s) Oral daily  saccharomyces boulardii 250 milliGRAM(s) Oral two times a day  vancomycin  IVPB 750 milliGRAM(s) IV Intermittent every 12 hours    SOCIAL       FAMILY   FAMILY HISTORY:  No pertinent family history in first degree relatives    REVIEW OF SYSTEMS:  CONSTITUTIONAL:  No Fever or chills  HEENT:   No diplopia or blurred vision.  No earache, sore throat or runny nose.  CARDIOVASCULAR:  No pressure, squeezing, strangling, tightness, heaviness or aching about the chest, neck, axilla or epigastrium.  RESPIRATORY:  No cough, shortness of breath, PND or orthopnea.  GASTROINTESTINAL:  No nausea, vomiting or diarrhea.  GENITOURINARY:  No dysuria, frequency or urgency. No Blood in urine  MUSCULOSKELETAL:   AS PER HPI  SKIN:  No change in skin, hair or nails.  NEUROLOGIC:  No paresthesias, fasciculations, seizures or weakness.  PSYCHIATRIC:  No disorder of thought or mood.  ENDOCRINE:  No heat or cold intolerance, polyuria or polydipsia.  HEMATOLOGICAL:  No easy bruising or bleeding.            Physical Exam:  ICU Vital Signs Last 24 Hrs  T(C): 36.4 (14 Dec 2019 05:42), Max: 36.4 (14 Dec 2019 05:42)  T(F): 97.5 (14 Dec 2019 05:42), Max: 97.5 (14 Dec 2019 05:42)  HR: 85 (14 Dec 2019 05:42) (85 - 97)  BP: 104/68 (14 Dec 2019 05:42) (104/68 - 118/70)  BP(mean): --  ABP: --  ABP(mean): --  RR: 18 (14 Dec 2019 05:42) (18 - 18)  SpO2: 94% (14 Dec 2019 05:42) (94% - 98%)    GEN: NAD,   HEENT: normocephalic and atraumatic. EOMI. TIFFANY.    NECK: Supple. No carotid bruits.  No lymphadenopathy or thyromegaly.  LUNGS: Clear to auscultation.  HEART: Regular rate and rhythm without murmur.  ABDOMEN: Soft, nontender, and nondistended.  Positive bowel sounds.    : No CVA tenderness  EXTREMITIES: Without any cyanosis, clubbing, rash, lesions or edema.  MSK: no joint swelling  NEUROLOGIC: Cranial nerves II through XII are grossly intact.  PSYCHIATRIC: Appropriate affect .  SKIN: No ulceration or induration present.        Labs:  12-14    138  |  98  |  37.0<H>  ----------------------------<  96  4.5   |  30.0<H>  |  0.84    Ca    9.3      14 Dec 2019 06:45  Phos  2.9     12-14  Mg     2.0     12-14                    CAPILLARY BLOOD GLUCOSE            RECENT CULTURES:  12-12 @ 04:22 .Blood     No growth at 48 hours        12-12 @ 04:21 .Blood     No growth at 48 hours        12-11 @ 09:40          NotDetec  12-11 @ 07:28 .Urine Methicillin resistant Staphylococcus aureus    >100,000 CFU/ml Methicillin resistant Staphylococcus aureus  .  TYPE: (C=Critical, N=Notification, A=Abnormal) C  TESTS:  _ MRSA  DATE/TIME CALLED: _ 12/13/2019 09:31:00  CALLED TO: Tatiana Porras RN  READ BACK (2 Patient Identifiers)(Y/N): _ Y  READ BACK VALUES (Y/N): _ Y  CALLED BY: Tatiana Gregory    Sent copy to IC and logistic.

## 2019-12-14 NOTE — PROGRESS NOTE ADULT - SUBJECTIVE AND OBJECTIVE BOX
VERENICE CROWLIN  [id number]      Chief Complaint:  Acute CHF/AF    Interval History:  Patient without SOB in chair or short exertion.  Denies CP/palps.    Tele:  AF, rate controlled, brief NSVT          ascorbic acid 500 milliGRAM(s) Oral daily  aspirin  chewable 81 milliGRAM(s) Oral daily  atorvastatin 20 milliGRAM(s) Oral at bedtime  benzocaine 15 mG/menthol 3.6 mG (Sugar-Free) Lozenge 1 Lozenge Oral every 4 hours PRN  cefTRIAXone   IVPB 1000 milliGRAM(s) IV Intermittent every 24 hours  cefTRIAXone   IVPB      cholecalciferol 1000 Unit(s) Oral daily  folic acid 1 milliGRAM(s) Oral daily  furosemide    Tablet 20 milliGRAM(s) Oral daily  heparin  Injectable 5000 Unit(s) SubCutaneous every 12 hours  influenza   Vaccine 0.5 milliLiter(s) IntraMuscular once  levETIRAcetam 500 milliGRAM(s) Oral two times a day  metoprolol succinate ER 50 milliGRAM(s) Oral daily  potassium chloride    Tablet ER 20 milliEquivalent(s) Oral daily  saccharomyces boulardii 250 milliGRAM(s) Oral two times a day          Physical Exam:  T(C): 36.4 (12-14-19 @ 05:42), Max: 36.5 (12-13-19 @ 15:28)  HR: 85 (12-14-19 @ 05:42) (85 - 104)  BP: 104/68 (12-14-19 @ 05:42) (104/68 - 125/73)  RR: 18 (12-14-19 @ 05:42) (18 - 18)  SpO2: 94% (12-14-19 @ 05:42) (94% - 100%)  Wt(kg): --  General: Comfortable in NAD  Neck: No JVD  CVS: nl s1s2, no s3, +ONDINA, irreg  Pulm: CTA b/l, diminished at bases  Abd: soft, non-tender  Ext: Tr LE edema b/l  Neuro A&O x3  Psych: Normal affect        Labs:   14 Dec 2019 06:45    138    |  98     |  37.0   ----------------------------<  96     4.5     |  30.0   |  0.84     Ca    9.3        14 Dec 2019 06:45  Phos  2.9       14 Dec 2019 06:45  Mg     2.0       14 Dec 2019 06:45        Echo personally reviewed:  1. Normal LV size and fxn, EF 65-70%.  2. Mildly enlarged RV with normal RV fxn.  3. Severe MERLINE.  4. Densely calcified MV with mod MS and MR.  5. TAVR noted with normal function.  6. Severe TR.  7. At least mild PHTN, ?underestimated 2/2 severe TR.  8. No effusion.        Assessment:  83yo F PMHx AS s/p TAVR, CAD s/p stents, paroxysmal atrial fibrillation s/p PPM, COPD, UTIs presented from [place] c/o increased b/l LE swelling with associated fatigue and decreased ambulation along with SOB for last 2 weeks. Patient with edema and JVD and crackles one exam along with elevated BNP and CXR c/w acute CHF.  EF previously normal.  Will need diuresis.  Likely 2/2 noncompliance with Lasix.  Also ?underlying respiratory infection.  AF rates at admit 2/2 SOB and CHF, improving.  Not on A/C 2/2 fall risk.  -Improving with diuretics, now with some hypotension.  -Echo with preserved EF, normal TAVR, mod MS/MR and sev TR with PHTN.  Limited study but no evidence of valve vegetation.  -MRSA in urine but BCx negative.    Plan:  1. Tele  2. Lasix po.  Would not hold completely.  3. Continue ASA/BB/statin.  4. Rate control AF with BB.  5. Will continue off A/C.  Has been off 2/2 fall risk.  6. Abx per ID.  BCx negative.

## 2019-12-14 NOTE — PROGRESS NOTE ADULT - ASSESSMENT
83yo F with pmh of AS s/p TAVR, CAD s/p stents, paroxysmal atrial fibrillation, PPM, UTIs presented from [place] c/o increased b/l LE swelling with associated fatigue and decreased ambulation x 1.5 weeks. Pt states that she has not taken her lasix b/l she is unable to ambulate independently and unable to make to the bathroom since she has increasing sob as well as sob. Pt sees Dr. Melendez and as per chart review pt saw him on in 9/19 had PPM Interrogation which showed persistent atrial fibrillation. Pt also c/o nonspecific lower abdominal quadrant pain and dysuria, denies fever, chills, palpitations, focal weakness. In the ED tachycardia 122, EKG showed afib rvr, pt placed on venti mask for increased work of breathing with improvement, received lasix 40mg IV x 1dose, Cardizem 10mg IV push and Metroprolol 50mg x 1 dose with improved rate and good urine output.    URINE CX POS  STAPH AURUES MRSA  STAPH NOT  A COMMON ORGANISM IN URINE   WOULD AWAIT BLOOD CX  RESULTS    ECHOCARDIOGRAM  DONE  AND RENAL SONOGRAM ORDERED   CONTINUE IV VANCO  WILL FOLLOW UP WITH FURTHER RECOMMENDATIONSS

## 2019-12-14 NOTE — PROGRESS NOTE ADULT - SUBJECTIVE AND OBJECTIVE BOX
Internal Medicine Hospitalist Progress Note    follow up for SOB , CHF , UTI   pt is evaluated earlier today   no new complaints , sitting in the chair   no new complaints denies SOB , + cough     Vital Signs Last 24 Hrs  T(C): 36.4 (14 Dec 2019 05:42), Max: 36.5 (13 Dec 2019 15:28)  T(F): 97.5 (14 Dec 2019 05:42), Max: 97.7 (13 Dec 2019 15:28)  HR: 85 (14 Dec 2019 05:42) (85 - 104)  BP: 104/68 (14 Dec 2019 05:42) (104/68 - 125/73)  BP(mean): --  RR: 18 (14 Dec 2019 05:42) (18 - 18)  SpO2: 94% (14 Dec 2019 05:42) (94% - 100%)    Constitutional: awake alert no respiratory  distress     Respiratory: bibasilar rales , otherwise CTA , no wheezing     Cardiovascular: regular rate rythm S1 /s2     Gastrointestinal: soft no tenderness , BS positive     Extremities: no pretibial edema     12-14    138  |  98  |  37.0<H>  ----------------------------<  96  4.5   |  30.0<H>  |  0.84    Ca    9.3      14 Dec 2019 06:45  Phos  2.9     12-14  Mg     2.0     12-14                            13.7   11.62 )-----------( 326      ( 12 Dec 2019 04:20 )             42.2   Culture - Urine (12.11.19 @ 07:28)    -  Ampicillin/Sulbactam: R <=8/4    -  Cefazolin: R >16    -  Daptomycin: S <=0.5    -  Gentamicin: S <=4 Should not be used as monotherapy    -  Linezolid: S 2    -  Oxacillin: R >2    -  Penicillin: R 8    -  RIF- Rifampin: S <=1 Should not be used as monotherapy    -  Tetra/Doxy: S <=4    -  Trimethoprim/Sulfamethoxazole: S <=0.5/9.5    -  Vancomycin: S 1    Specimen Source: .Urine    Culture Results:   >100,000 CFU/ml Methicillin resistant Staphylococcus aureus  .  TYPE: (C=Critical, N=Notification, A=Abnormal) C  TESTS:  _ MRSA  DATE/TIME CALLED: _ 12/13/2019 09:31:00  CALLED TO: Tatiana Porras RN  READ BACK (2 Patient Identifiers)(Y/N): _ Y  READ BACK VALUES (Y/N): _ Y  CALLED BY: Tatiana Gregory    Sent copy to IC and logistic.    Organism Identification: Methicillin resistant Staphylococcus aureus    Organism: Methicillin resistant Staphylococcus aureus    Method Type: DILSHAD

## 2019-12-14 NOTE — PROGRESS NOTE ADULT - ASSESSMENT
85yo F with AS s/p TAVR, CAD s/p stents, paroxysmal atrial fibrillation s/p PPM, COPD presented from [place] c/o increased b/l LE swelling with associated fatigue and decreased ambulation along with SOB for last 2 weeks. she was found to be in atrial fib on admission , spiked fever , UA positive started on empirical iv rocephin day 1 , cx resulted MRSA positive ID consulted ,       1- CHF acute on cronic - improving   cardiology follow up appreciated   on po lasix    monitor     2- MRSA positive UTI / sepsis resolving  ID consult appreciated   trend wbc   cont abx per ID     3- Atrial fib with RVR - improving   cont metoprolol   not on AC due to fall risk and h/o intracranial bleed     4- h/o COPD - cont neb   oxygen as needed   incentive spirometry     5- Hypokalemia   resolved , cont daily K on laix       out of bed   PT as tolerate   called the daughter to update left message

## 2019-12-15 NOTE — DIETITIAN INITIAL EVALUATION ADULT. - PROBLEM SELECTOR PLAN 3
nonspecific lower quadrant abdominal pain   hx of recurrent UTIs   afebrile, mild leukocytosis, UA dirt  started on ceftriaxone  pending urine culture

## 2019-12-15 NOTE — DIETITIAN INITIAL EVALUATION ADULT. - MALNUTRITION
NFPE performed. Severe muscle wasting at temples, clavicle, shoulder. Severe fat loss in orbital region, buccal pads, triceps. Severe (chronic)

## 2019-12-15 NOTE — DIETITIAN INITIAL EVALUATION ADULT. - OTHER INFO
85yo F with AS s/p TAVR, CAD s/p stents, paroxysmal atrial fibrillation s/p PPM, COPD presented from [place] c/o increased b/l LE swelling with associated fatigue and decreased ambulation along with SOB for last 2 weeks. Pt and family members x2 were present during interview. Pt reported poor-fair po intake. Pt consumed 0% of breakfast per tray observation, provided nourishments. Pt reports poor po intake prior to admission due to disliking food at the [place]. Pt reported consuming Ensure. Family reported rapid weight loss 2 years ago but recent weight gain. Pt provided with heart healthy verbal education and encouraged to consume HBV protein.

## 2019-12-15 NOTE — PROGRESS NOTE ADULT - ASSESSMENT
85yo F with AS s/p TAVR, CAD s/p stents, paroxysmal atrial fibrillation s/p PPM, COPD presented from [place] c/o increased b/l LE swelling with associated fatigue and decreased ambulation along with SOB for last 2 weeks. she was found to be in atrial fib on admission , UA positive started on empirical iv abx for suspected UTI . cardiology consulted , pt is also with CHF on admission likely due to rapid atrial fib iv lasix initiated , rate improved Pt had fever 12/12 am sepsis protocol initiated , blood cx ordered , Urine cx is positive MRSA  ,ID consulted  rocephin stopped vanco started . Pt is with normal EF , moderate MR and mod to severe TR     1- MRSA positive UTI / sepsis resolving  ID consult appreciated   trend wbc   cont abx per ID , monitor vanco level   renal sono is pending     2- CHF acute on cronic diastolic CHF - improving   cardiology follow up appreciated   on po lasix  now     3- Constipation   add miralax , senna   mineral oil enema x1 stat  MOM as needed     4- Atrial fib with RVR - improving   cont metoprolol   not on AC due to fall risk and h/o intracranial bleed     5- h/o COPD - cont neb   oxygen as needed   incentive spirometry     6 Hypokalemia -resolved replaced    cont daily K on lasix   repeat BMP in am       out of bed   PT as tolerate   spoke to her son yesterday over the phone

## 2019-12-15 NOTE — PROGRESS NOTE ADULT - SUBJECTIVE AND OBJECTIVE BOX
VERENICE HUDSON  [id number]      Chief Complaint:  Acute CHF/AF    Interval History:  Patient without SOB in bed, no orthopnea.  C/o diffuse body aches.  Denies CP/palps.    Tele:  AF, occ RVR          ascorbic acid 500 milliGRAM(s) Oral daily  aspirin  chewable 81 milliGRAM(s) Oral daily  atorvastatin 20 milliGRAM(s) Oral at bedtime  benzocaine 15 mG/menthol 3.6 mG (Sugar-Free) Lozenge 1 Lozenge Oral every 4 hours PRN  cholecalciferol 1000 Unit(s) Oral daily  folic acid 1 milliGRAM(s) Oral daily  furosemide    Tablet 20 milliGRAM(s) Oral daily  heparin  Injectable 5000 Unit(s) SubCutaneous every 12 hours  influenza   Vaccine 0.5 milliLiter(s) IntraMuscular once  levETIRAcetam 500 milliGRAM(s) Oral two times a day  magnesium hydroxide Suspension 30 milliLiter(s) Oral daily PRN  metoprolol succinate ER 50 milliGRAM(s) Oral daily  mineral oil enema 133 milliLiter(s) Rectal once  polyethylene glycol 3350 17 Gram(s) Oral daily  potassium chloride    Tablet ER 20 milliEquivalent(s) Oral daily  saccharomyces boulardii 250 milliGRAM(s) Oral two times a day  senna 2 Tablet(s) Oral at bedtime  vancomycin  IVPB 750 milliGRAM(s) IV Intermittent every 12 hours          Physical Exam:  T(C): 36.3 (12-15-19 @ 06:16), Max: 36.5 (12-14-19 @ 15:41)  HR: 93 (12-15-19 @ 06:16) (93 - 124)  BP: 130/84 (12-15-19 @ 06:16) (124/78 - 130/84)  RR: 18 (12-15-19 @ 06:16) (18 - 18)  SpO2: 97% (12-15-19 @ 06:16) (95% - 97%)  Wt(kg): --  General: Comfortable in NAD  Neck: No JVD  CVS: nl s1s2, no s3, +ONDINA, irreg  Pulm: CTA b/l, diminished at bases  Abd: soft, non-tender  Ext: Tr LE edema b/l  Neuro A&O x3  Psych: Normal affect      Labs:   14 Dec 2019 06:45    138    |  98     |  37.0   ----------------------------<  96     4.5     |  30.0   |  0.84     Ca    9.3        14 Dec 2019 06:45  Phos  2.9       14 Dec 2019 06:45  Mg     2.0       14 Dec 2019 06:45                            13.6   10.63 )-----------( 333      ( 15 Dec 2019 06:46 )             42.3             Echo personally reviewed:  1. Normal LV size and fxn, EF 65-70%.  2. Mildly enlarged RV with normal RV fxn.  3. Severe MERLINE.  4. Densely calcified MV with mod MS and MR.  5. TAVR noted with normal function.  6. Severe TR.  7. At least mild PHTN, ?underestimated 2/2 severe TR.  8. No effusion.        Assessment:  85yo F PMHx AS s/p TAVR, CAD s/p stents, paroxysmal atrial fibrillation s/p PPM, COPD, UTIs presented from [place] c/o increased b/l LE swelling with associated fatigue and decreased ambulation along with SOB for last 2 weeks. Patient with edema and JVD and crackles one exam along with elevated BNP and CXR c/w acute CHF.  EF previously normal.  Will need diuresis.  Likely 2/2 noncompliance with Lasix.  Also ?underlying respiratory infection.  AF rates at admit 2/2 SOB and CHF, improving.  Not on A/C 2/2 fall risk.  -Improving with diuretics, now with some hypotension.  -Echo with preserved EF, normal TAVR, mod MS/MR and sev TR with PHTN.  Limited study but no evidence of valve vegetation.  -MRSA in urine but BCx negative.    Plan:  1. Tele  2. Lasix po.  Would not hold completely.  3. Continue ASA/BB/statin.  4. Rate control AF with BB.  5. Will continue off A/C.  Has been off 2/2 fall risk.  6. Abx per ID.  BCx negative.

## 2019-12-15 NOTE — CHART NOTE - NSCHARTNOTEFT_GEN_A_CORE
Upon Nutritional Assessment by the Registered Dietitian your patient was determined to meet criteria / has evidence of the following diagnosis/diagnoses:            [ x] Severe Protein Calorie Malnutrition        Findings as based on:  •  Comprehensive nutrition assessment and consultation  •  Calorie counts (nutrient intake analysis)  •  Food acceptance and intake status from observations by staff  •  Follow up  •  Patient education  •  Intervention secondary to interdisciplinary rounds  •   concerns      Treatment:    The following diet has been recommended:      PROVIDER Section:     By signing this assessment you are acknowledging and agree with the diagnosis/diagnoses assigned by the Registered Dietitian    Comments:    RX: continue Ensure TID, continue Vitamin C, encourage po intake, monitor weights Upon Nutritional Assessment by the Registered Dietitian your patient was determined to meet criteria / has evidence of the following diagnosis/diagnoses:            [ x] Severe Protein Calorie Malnutrition        Findings as based on:  •  Comprehensive nutrition assessment and consultation  •  Calorie counts (nutrient intake analysis)  •  Food acceptance and intake status from observations by staff  •  Follow up  •  Patient education  •  Intervention secondary to interdisciplinary rounds  •   concerns      Treatment:    The following diet has been recommended:      PROVIDER Section:     By signing this assessment you are acknowledging and agree with the diagnosis/diagnoses assigned by the Registered Dietitian    Comments:    RX: continue Ensure TID, continue Vitamin C, encourage po intake, monitor weights    agree with above

## 2019-12-15 NOTE — PROGRESS NOTE ADULT - SUBJECTIVE AND OBJECTIVE BOX
Internal Medicine Hospitalist Progress Note    follow up for SOB , CHF , UTI   pt is seen , she is c/o severe constipation , no nausea no abd pain , + discomfort in anal area , no BM for 5 days per pt   no chest pain , no SOB       Vital Signs Last 24 Hrs  T(C): 36.3 (15 Dec 2019 06:16), Max: 36.5 (14 Dec 2019 15:41)  T(F): 97.4 (15 Dec 2019 06:16), Max: 97.7 (14 Dec 2019 15:41)  HR: 93 (15 Dec 2019 06:16) (93 - 124)  BP: 130/84 (15 Dec 2019 06:16) (124/78 - 130/84)  BP(mean): --  RR: 18 (15 Dec 2019 06:16) (18 - 18)  SpO2: 97% (15 Dec 2019 06:16) (95% - 97%)    Constitutional: awake alert no respiratory  distress     Respiratory: bibasilar rales , otherwise CTA , no wheezing     Cardiovascular: regular rate rythm S1 /s2     Gastrointestinal: soft no tenderness , BS positive     Extremities: no pretibial edema                           13.6   10.63 )-----------( 333      ( 15 Dec 2019 06:46 )             42.3   12-14    138  |  98  |  37.0<H>  ----------------------------<  96  4.5   |  30.0<H>  |  0.84    Ca    9.3      14 Dec 2019 06:45  Phos  2.9     12-14  Mg     2.0     12-14             Culture - Urine (12.11.19 @ 07:28)    -  Ampicillin/Sulbactam: R <=8/4    -  Cefazolin: R >16    -  Daptomycin: S <=0.5    -  Gentamicin: S <=4 Should not be used as monotherapy    -  Linezolid: S 2    -  Oxacillin: R >2    -  Penicillin: R 8    -  RIF- Rifampin: S <=1 Should not be used as monotherapy    -  Tetra/Doxy: S <=4    -  Trimethoprim/Sulfamethoxazole: S <=0.5/9.5    -  Vancomycin: S 1    Specimen Source: .Urine    Culture Results:   >100,000 CFU/ml Methicillin resistant Staphylococcus aureus  .  TYPE: (C=Critical, N=Notification, A=Abnormal) C  TESTS:  _ MRSA  DATE/TIME CALLED: _ 12/13/2019 09:31:00  CALLED TO: Tatiana Porras RN  READ BACK (2 Patient Identifiers)(Y/N): _ Y  READ BACK VALUES (Y/N): _ Y  CALLED BY: Tatiana Gregory    Sent copy to IC and logistic.    Organism Identification: Methicillin resistant Staphylococcus aureus    Organism: Methicillin resistant Staphylococcus aureus    Method Type: DILSHAD

## 2019-12-16 NOTE — PROGRESS NOTE ADULT - SUBJECTIVE AND OBJECTIVE BOX
INFECTIOUS DISEASES AND INTERNAL MEDICINE at [facility]  =======================================================  Mason Goodman MD  Diplomates American Board of Internal Medicine and Infectious Diseases  Telephone [phone]  Fax            [phone]  =======================================================    VERENICE HUDSON [id number]    Follow up: UTI MRSA    Allergies:  CHOCOLATE ENSURE ONLY (Unknown)  No Known Allergies      Medications:  ascorbic acid 500 milliGRAM(s) Oral daily  aspirin  chewable 81 milliGRAM(s) Oral daily  atorvastatin 20 milliGRAM(s) Oral at bedtime  benzocaine 15 mG/menthol 3.6 mG (Sugar-Free) Lozenge 1 Lozenge Oral every 4 hours PRN  cholecalciferol 1000 Unit(s) Oral daily  folic acid 1 milliGRAM(s) Oral daily  furosemide    Tablet 20 milliGRAM(s) Oral daily  heparin  Injectable 5000 Unit(s) SubCutaneous every 12 hours  influenza   Vaccine 0.5 milliLiter(s) IntraMuscular once  levETIRAcetam 500 milliGRAM(s) Oral two times a day  metoprolol succinate ER 50 milliGRAM(s) Oral daily  potassium chloride    Tablet ER 20 milliEquivalent(s) Oral daily  saccharomyces boulardii 250 milliGRAM(s) Oral two times a day  vancomycin  IVPB 750 milliGRAM(s) IV Intermittent every 12 hours    SOCIAL       FAMILY   FAMILY HISTORY:  No pertinent family history in first degree relatives    REVIEW OF SYSTEMS:  CONSTITUTIONAL:  No Fever or chills  HEENT:   No diplopia or blurred vision.  No earache, sore throat or runny nose.  CARDIOVASCULAR:  No pressure, squeezing, strangling, tightness, heaviness or aching about the chest, neck, axilla or epigastrium.  RESPIRATORY:  No cough, shortness of breath, PND or orthopnea.  GASTROINTESTINAL:  No nausea, vomiting or diarrhea.  GENITOURINARY:  No dysuria, frequency or urgency. No Blood in urine  MUSCULOSKELETAL:   AS PER HPI  SKIN:  No change in skin, hair or nails.  NEUROLOGIC:  No paresthesias, fasciculations, seizures or weakness.  PSYCHIATRIC:  No disorder of thought or mood.  ENDOCRINE:  No heat or cold intolerance, polyuria or polydipsia.  HEMATOLOGICAL:  No easy bruising or bleeding.            Physical Exam:  I Vital Signs Last 24 Hrs  T(C): 36.6 (16 Dec 2019 09:47), Max: 36.6 (16 Dec 2019 04:58)  T(F): 97.8 (16 Dec 2019 09:47), Max: 97.8 (16 Dec 2019 04:58)  HR: 79 (16 Dec 2019 09:47) (79 - 109)  BP: 116/73 (16 Dec 2019 09:47) (99/71 - 116/73)  BP(mean): --  RR: 16 (16 Dec 2019 09:47) (16 - 19)  SpO2: 96% (16 Dec 2019 09:47) (94% - 100%)    GEN: NAD,   HEENT: normocephalic and atraumatic. EOMI. TIFFANY.    NECK: Supple. No carotid bruits.  No lymphadenopathy or thyromegaly.  LUNGS: Clear to auscultation.  HEART: Regular rate and rhythm without murmur.  ABDOMEN: Soft, nontender, and nondistended.  Positive bowel sounds.    : No CVA tenderness  EXTREMITIES: Without any cyanosis, clubbing, rash, lesions or edema.  MSK: no joint swelling  NEUROLOGIC: Cranial nerves II through XII are grossly intact.  PSYCHIATRIC: Appropriate affect .  SKIN: No ulceration or induration present.        Labs:                         13.6   10.63 )-----------( 333      ( 15 Dec 2019 06:46 )             42.3                        CAPILLARY BLOOD GLUCOSE            RECENT CULTURES:  12-12 @ 04:22 .Blood     No growth at 48 hours        12-12 @ 04:21 .Blood     No growth at 48 hours        12-11 @ 09:40          NotDetec  12-11 @ 07:28 .Urine Methicillin resistant Staphylococcus aureus    >100,000 CFU/ml Methicillin resistant Staphylococcus aureus  .  TYPE: (C=Critical, N=Notification, A=Abnormal) C  TESTS:  _ MRSA  DATE/TIME CALLED: _ 12/13/2019 09:31:00  CALLED TO: Tatiana Porras RN  READ BACK (2 Patient Identifiers)(Y/N): _ Y  READ BACK VALUES (Y/N): _ Y  CALLED BY: Tatiana Gregory    Sent copy to IC and logistic.

## 2019-12-16 NOTE — PROGRESS NOTE ADULT - ASSESSMENT
83yo F with pmh of AS s/p TAVR, CAD s/p stents, paroxysmal atrial fibrillation, PPM, UTIs presented from [place] c/o increased b/l LE swelling with associated fatigue and decreased ambulation x 1.5 weeks. Pt states that she has not taken her lasix b/l she is unable to ambulate independently and unable to make to the bathroom since she has increasing sob as well as sob. Pt sees Dr. Melendez and as per chart review pt saw him on in 9/19 had PPM Interrogation which showed persistent atrial fibrillation. Pt also c/o nonspecific lower abdominal quadrant pain and dysuria, denies fever, chills, palpitations, focal weakness. In the ED tachycardia 122, EKG showed afib rvr, pt placed on venti mask for increased work of breathing with improvement, received lasix 40mg IV x 1dose, Cardizem 10mg IV push and Metroprolol 50mg x 1 dose with improved rate and good urine output.    URINE CX POS  STAPH AURUES MRSA  STAPH NOT  A COMMON ORGANISM IN URINE  POSSIBLE LYLE IN PAST     BLOOD CX   NEG     ECHOCARDIOGRAM  OK   RENAL SONOGRAM  NEG   ON  VANCO  OK TO CHANGE TO ORAL DOXY TO COMPLETE TOTAL 7 DAYS  D/W HOSPITALIST

## 2019-12-16 NOTE — PROGRESS NOTE ADULT - ASSESSMENT
85yo F with AS s/p TAVR, CAD s/p stents, paroxysmal atrial fibrillation s/p PPM, COPD presented from [place] c/o increased b/l LE swelling with associated fatigue and decreased ambulation along with SOB for last 2 weeks. she was found to be in atrial fib on admission , UA positive started on empirical iv abx for suspected UTI . cardiology consulted , pt is also with CHF on admission likely due to rapid atrial fib iv lasix initiated , rate improved Pt had fever 12/12 am sepsis protocol initiated , blood cx ordered , Urine cx is positive MRSA  ,ID consulted  rocephin stopped vanco started . Pt is with normal EF , moderate MR and mod to severe TR     #MRSA positive UTI / sepsis resolving  - vanco trough elevated in am hold am dose restart in pm   - trend wbc  - id consult appreciated  - abx per ID   - renal sono negative     #CHF acute on cronic diastolic CHF   - improving   - cardiology consult appreciated  - po lasix    #Constipation   - miralax , senna     #Atrial fib with RVR   - improving   - metoprolol   - not on AC due to fall risk and h/o intracranial bleed     # h/o COPD   - no exacerbation at this time   - cont neb   - 02 prn  - incentive spirometry    #DVT prophylaxis  - heparin SC

## 2019-12-16 NOTE — PROGRESS NOTE ADULT - SUBJECTIVE AND OBJECTIVE BOX
Patient is a 84y old  Female who presents with a chief complaint of AFib RVR, decompensated HF (16 Dec 2019 11:51)    Patient seen and examined at bedside.     ALLERGIES:  CHOCOLATE ENSURE ONLY (Unknown)  No Known Allergies    MEDICATIONS  (STANDING):  ascorbic acid 500 milliGRAM(s) Oral daily  aspirin  chewable 81 milliGRAM(s) Oral daily  atorvastatin 20 milliGRAM(s) Oral at bedtime  cholecalciferol 1000 Unit(s) Oral daily  folic acid 1 milliGRAM(s) Oral daily  furosemide    Tablet 20 milliGRAM(s) Oral daily  heparin  Injectable 5000 Unit(s) SubCutaneous every 12 hours  influenza   Vaccine 0.5 milliLiter(s) IntraMuscular once  levETIRAcetam 500 milliGRAM(s) Oral two times a day  metoprolol succinate ER 50 milliGRAM(s) Oral daily  polyethylene glycol 3350 17 Gram(s) Oral daily  potassium chloride    Tablet ER 20 milliEquivalent(s) Oral daily  saccharomyces boulardii 250 milliGRAM(s) Oral two times a day  senna 2 Tablet(s) Oral at bedtime    MEDICATIONS  (PRN):  benzocaine 15 mG/menthol 3.6 mG (Sugar-Free) Lozenge 1 Lozenge Oral every 4 hours PRN Sore Throat  magnesium hydroxide Suspension 30 milliLiter(s) Oral daily PRN Constipation    Vital Signs Last 24 Hrs  T(F): 97.8 (16 Dec 2019 09:47), Max: 97.8 (16 Dec 2019 04:58)  HR: 79 (16 Dec 2019 09:47) (79 - 109)  BP: 116/73 (16 Dec 2019 09:47) (99/71 - 116/73)  RR: 16 (16 Dec 2019 09:47) (16 - 19)  SpO2: 96% (16 Dec 2019 09:47) (94% - 100%)  I&O's Summary    15 Dec 2019 07:01  -  16 Dec 2019 07:00  --------------------------------------------------------  IN: 490 mL / OUT: 1051 mL / NET: -561 mL    16 Dec 2019 07:01  -  16 Dec 2019 14:06  --------------------------------------------------------  IN: 220 mL / OUT: 401 mL / NET: -181 mL      PHYSICAL EXAM:  General: NAD, alert  ENT: MMM, no thrush  Neck: Supple, No JVD  Lungs: Clear to auscultation bilaterally, good air entry, non-labored breathing  Cardio: +s1/s2; trace edema b/l le  Abdomen: Soft, Nontender, Nondistended; Bowel sounds present  Extremities: No calf tenderness    LABS:                        13.6   10.63 )-----------( 333      ( 15 Dec 2019 06:46 )             42.3     12-14    138  |  98  |  37.0  ----------------------------<  96  4.5   |  30.0  |  0.84    Ca    9.3      14 Dec 2019 06:45  Phos  2.9     12-14  Mg     2.0     12-14    eGFR if Non African American: 65 mL/min/1.73M2 (12-14-19 @ 06:45)  eGFR if African American: 75 mL/min/1.73M2 (12-14-19 @ 06:45)    Cultures  Culture - Blood (collected 12 Dec 2019 04:22)  Source: .Blood  Preliminary Report (14 Dec 2019 05:00):    No growth at 48 hours    Culture - Blood (collected 12 Dec 2019 04:21)  Source: .Blood  Preliminary Report (14 Dec 2019 05:00):    No growth at 48 hours    Culture - Urine (collected 11 Dec 2019 07:28)  Source: .Urine  Final Report (13 Dec 2019 09:31):    >100,000 CFU/ml Methicillin resistant Staphylococcus aureus    RADIOLOGY & ADDITIONAL TESTS:  < from: US Renal (12.15.19 @ 09:35) >  IMPRESSION:   No renal calculus or gross hydronephrosis is appreciated bilaterally.  < end of copied text >

## 2019-12-16 NOTE — PROGRESS NOTE ADULT - SUBJECTIVE AND OBJECTIVE BOX
VERENICE HUDSON  [id number]      Chief Complaint:  follow up Acute CHFpEF/AF    Interval History:  Patient without SOB in bed, no orthopnea. no CP, tired    Tele:  AF, occasionally increased rates        ascorbic acid 500 milliGRAM(s) Oral daily  aspirin  chewable 81 milliGRAM(s) Oral daily  atorvastatin 20 milliGRAM(s) Oral at bedtime  benzocaine 15 mG/menthol 3.6 mG (Sugar-Free) Lozenge 1 Lozenge Oral every 4 hours PRN  cholecalciferol 1000 Unit(s) Oral daily  folic acid 1 milliGRAM(s) Oral daily  furosemide    Tablet 20 milliGRAM(s) Oral daily  heparin  Injectable 5000 Unit(s) SubCutaneous every 12 hours  influenza   Vaccine 0.5 milliLiter(s) IntraMuscular once  levETIRAcetam 500 milliGRAM(s) Oral two times a day  magnesium hydroxide Suspension 30 milliLiter(s) Oral daily PRN  metoprolol succinate ER 50 milliGRAM(s) Oral daily  polyethylene glycol 3350 17 Gram(s) Oral daily  potassium chloride    Tablet ER 20 milliEquivalent(s) Oral daily  saccharomyces boulardii 250 milliGRAM(s) Oral two times a day  senna 2 Tablet(s) Oral at bedtime          Physical Exam:  T(C): 36.6 (12-16-19 @ 09:47), Max: 36.6 (12-16-19 @ 04:58)  HR: 79 (12-16-19 @ 09:47) (79 - 109)  BP: 116/73 (12-16-19 @ 09:47) (99/71 - 116/73)  RR: 16 (12-16-19 @ 09:47) (16 - 19)  SpO2: 96% (12-16-19 @ 09:47) (94% - 100%)  Wt(kg): --  General: Comfortable in NAD  HEENT: dry MM, sclera anicteric  Resp: CTA bilaterally, diminished at bases  CVS: nl s1s2, irregular no s3/JVD  Abd: soft, NT, ND, BS+  Ext: trace edema  Neuro A&O x3  Psych: Normal affect    I&O's Summary    15 Dec 2019 07:01  -  16 Dec 2019 07:00  --------------------------------------------------------  IN: 490 mL / OUT: 1051 mL / NET: -561 mL    16 Dec 2019 07:01  -  16 Dec 2019 11:51  --------------------------------------------------------  IN: 220 mL / OUT: 401 mL / NET: -181 mL          Labs:                               13.6   10.63 )-----------( 333      ( 15 Dec 2019 06:46 )             42.3             Echo  1. Normal LV size and fxn, EF 65-70%.  2. Mildly enlarged RV with normal RV fxn.  3. Severe MERLINE.  4. Densely calcified MV with mod MS and MR.  5. TAVR noted with normal function.  6. Severe TR.  7. At least mild PHTN, ?underestimated 2/2 severe TR.  8. No effusion.        Assessment:  83yo F PMHx AS s/p TAVR, CAD s/p stents, paroxysmal atrial fibrillation s/p PPM, COPD, UTIs presented from [place] c/o increased b/l LE swelling with associated fatigue and decreased ambulation along with SOB for last 2 weeks. Patient with edema and JVD and crackles one exam along with elevated BNP and CXR c/w acute CHF.  EF previously normal.  Will need diuresis.  Likely 2/2 noncompliance with Lasix.  Also ?underlying respiratory infection.  AF rates at admit 2/2 SOB and CHF, improving.  Not on A/C 2/2 fall risk.  -Improving with diuretics, now more euvolemic  -Echo with preserved EF, normal TAVR, mod MS/MR and sev TR with PHTN.  Limited study but no evidence of valve vegetation.  -MRSA in urine but BCx negative.    Plan:  1. Continue PO lasix, CHFpEF better compensated now  2. Rate control AF, no A/C due to high fall risk  3. Antibiotics per ID  4. Remains a bit lethargic/fatigued, likely related to infection/sepsis which is improving  5. continue ASA/statin     Jesus Silva MD

## 2019-12-17 NOTE — PROGRESS NOTE ADULT - SUBJECTIVE AND OBJECTIVE BOX
seen for HF, debility    complaining of gen weakness/aches/pains  minimal cough, no chest pain/sob  ros otherwise negative       MEDICATIONS  (STANDING):  ascorbic acid 500 milliGRAM(s) Oral daily  aspirin  chewable 81 milliGRAM(s) Oral daily  atorvastatin 20 milliGRAM(s) Oral at bedtime  cholecalciferol 1000 Unit(s) Oral daily  doxycycline hyclate Capsule 100 milliGRAM(s) Oral every 12 hours  folic acid 1 milliGRAM(s) Oral daily  furosemide   Injectable 20 milliGRAM(s) IV Push once  heparin  Injectable 5000 Unit(s) SubCutaneous every 12 hours  influenza   Vaccine 0.5 milliLiter(s) IntraMuscular once  levETIRAcetam 500 milliGRAM(s) Oral two times a day  metoprolol tartrate 25 milliGRAM(s) Oral every 8 hours  polyethylene glycol 3350 17 Gram(s) Oral daily  potassium chloride    Tablet ER 20 milliEquivalent(s) Oral daily  saccharomyces boulardii 250 milliGRAM(s) Oral two times a day  senna 2 Tablet(s) Oral at bedtime    MEDICATIONS  (PRN):  benzocaine 15 mG/menthol 3.6 mG (Sugar-Free) Lozenge 1 Lozenge Oral every 4 hours PRN Sore Throat  magnesium hydroxide Suspension 30 milliLiter(s) Oral daily PRN Constipation      Allergies    No Known Allergies    Intolerances    CHOCOLATE ENSURE ONLY (Unknown)      Vital Signs Last 24 Hrs  T(C): 36.3 (17 Dec 2019 10:56), Max: 36.7 (16 Dec 2019 21:26)  T(F): 97.4 (17 Dec 2019 10:56), Max: 98.1 (16 Dec 2019 21:26)  HR: 98 (17 Dec 2019 12:27) (47 - 98)  BP: 117/92 (17 Dec 2019 12:27) (100/62 - 117/92)  BP(mean): --  RR: 18 (17 Dec 2019 12:27) (18 - 18)  SpO2: 100% (17 Dec 2019 12:27) (94% - 100%)    PHYSICAL EXAM:    GENERAL: NAD  CHEST/LUNG: crackles b/l 1/3 chest wall  HEART: Regular rate and rhythm; S1 S2  ABDOMEN: Soft, Bowel sounds present  EXTREMITIES:  trace edema  NERVOUS SYSTEM:  confused, nonfocal neuro. gen weakness    LABS:                CAPILLARY BLOOD GLUCOSE            RADIOLOGY & ADDITIONAL TESTS:

## 2019-12-17 NOTE — PROGRESS NOTE ADULT - ASSESSMENT
Assessment:  Ms. Ravi is an 84-year old woman with past medical history of Severe Aortic Stenosis s/p TAVR, Coronary artery disease (s/p PCI), Paroxysmal atrial fibrillation (on aspirin only), history of PPM, COPD and urinary tract infections who presented from [facility] with increased leg edema, dyspnea on exertion and fatigue found to have decompensated heart failure.     Patient also noted to have sepsis possible from urinary tract infection. TTE notable for LVEF 70-75%, moderate mitral regurgitation, mild mitral stenosis, well seated bioprosthetic TAVR with acceptable gradients, moderate-severe TR. No indication of valvular vegetation reported.     Recommendations:  [] Volume overload: Patient with less leg edema, but noted to have crackles on lung exam. Repeat pro BNP to see if any improvement (previously 6824). Check BMP. Patient is on Furosemide 20 mg daily, may require more diuresis pending lab results.    [] Sepsis: Treatment per primary team and Infectious Disease. Patient on antibiotics.   [] Atrial fibrillation: Episodes of RVR, but patient reports being asymptomatic. Continue to hold anticoagulation (as per outpatient cardiologist recommendation due to bleeding risk). Continue ASA 81 mg daily. Can fractionate metoprolol to Metoprolol tartrate 25 mg TID for now.   [] CAD: Stable. EKG with no new ischemia and troponin negative. Continue ASA 81 mg daily, Atorvastatin 20 mg daily       Thank you for the consult. We will follow along.    Ginger Harris MD  Cardiology, [facility]

## 2019-12-17 NOTE — PROGRESS NOTE ADULT - ASSESSMENT
83yo F with pmh of AS s/p TAVR, CAD s/p stents, paroxysmal atrial fibrillation, PPM, UTIs presented from [place] c/o increased b/l LE swelling with associated fatigue and decreased ambulation x 1.5 weeks. Pt states that she has not taken her lasix b/l she is unable to ambulate independently and unable to make to the bathroom since she has increasing sob as well as sob. Pt sees Dr. Melendez and as per chart review pt saw him on in 9/19 had PPM Interrogation which showed persistent atrial fibrillation. Pt also c/o nonspecific lower abdominal quadrant pain and dysuria, denies fever, chills, palpitations, focal weakness. In the ED tachycardia 122, EKG showed afib rvr, pt placed on venti mask for increased work of breathing with improvement, received lasix 40mg IV x 1dose, Cardizem 10mg IV push and Metroprolol 50mg x 1 dose with improved rate and good urine output.    URINE CX POS  STAPH AURUES MRSA  STAPH NOT  A COMMON ORGANISM IN URINE  POSSIBLE LYLE IN PAST     BLOOD CX   NEG     ECHOCARDIOGRAM  OK   RENAL SONOGRAM  NEG   ON  VANCO  OK TO CHANGE TO ORAL DOXY TO COMPLETE TOTAL 7 DAYS  D/W HOSPITALIST   D.W SON AT BEDSIDE  WILL FOLLOW UP AS NEEDED PLEASE CALLIF QUESTIONS

## 2019-12-17 NOTE — PROGRESS NOTE ADULT - SUBJECTIVE AND OBJECTIVE BOX
VERENICE PEDROZAOUGHLIN  [id number]    Interval History: The patient reports feeling fatigued today. Denies dyspnea and chest pain.       Tele: atrial fibrillation, rates up to 150 BPM at times      Current Meds:   ascorbic acid 500 milliGRAM(s) Oral daily  aspirin  chewable 81 milliGRAM(s) Oral daily  atorvastatin 20 milliGRAM(s) Oral at bedtime  benzocaine 15 mG/menthol 3.6 mG (Sugar-Free) Lozenge 1 Lozenge Oral every 4 hours PRN  cholecalciferol 1000 Unit(s) Oral daily  folic acid 1 milliGRAM(s) Oral daily  furosemide    Tablet 20 milliGRAM(s) Oral daily  heparin  Injectable 5000 Unit(s) SubCutaneous every 12 hours  influenza   Vaccine 0.5 milliLiter(s) IntraMuscular once  levETIRAcetam 500 milliGRAM(s) Oral two times a day  magnesium hydroxide Suspension 30 milliLiter(s) Oral daily PRN  metoprolol succinate ER 50 milliGRAM(s) Oral daily  polyethylene glycol 3350 17 Gram(s) Oral daily  potassium chloride    Tablet ER 20 milliEquivalent(s) Oral daily  saccharomyces boulardii 250 milliGRAM(s) Oral two times a day  senna 2 Tablet(s) Oral at bedtime  vancomycin  IVPB 500 milliGRAM(s) IV Intermittent every 12 hours      Objective:     Vital Signs:   T(C): 36.3 (12-17-19 @ 10:56), Max: 36.7 (12-16-19 @ 21:26)  HR: 98 (12-17-19 @ 12:27) (47 - 98)  BP: 117/92 (12-17-19 @ 12:27) (100/62 - 120/66)  RR: 18 (12-17-19 @ 12:27) (18 - 18)  SpO2: 100% (12-17-19 @ 12:27) (94% - 100%)  Wt(kg): --    Physical Exam:   General: elderly woman, sitting in bed, no distress  Neck: supple  CVS: JVP ~ 9 cm H20, irregularly irregular, s1, s2, no murmurs   Pulm: unlabored respirations, crackles present  Ext: trace lower extremity edema b/l   Neuro: A&O x3  Psych: Normal affect      Labs:     12/14/19:  Na 138, K 4.5, Cr 0.84     12/10/19  Serum pro BNP: 6824  Troponin < 0.01      ECG (12/10/19): atrial fibrillation with RVR at 122 BPM, old inferior infarct (appears similar to old outpatient ECG)    TTE (12/13/19):  Summary:   1. Severely enlarged left atrium.   2. Left ventricular ejection fraction, by visual estimation, is 70 to   75%.   3. Severely enlarged right atrium.   4. The right ventricular size is mildly enlarged. RV systolic function   is normal.   5. Moderate mitral valve regurgitation. Mild mitral stenosis.   6. Bioprosthetic aortic valve. Likely [employer] JOSE. well seated valve.   No leak. Acceptable gradients.   7. Moderate-severe tricuspid regurgitation.   8. There is no evidence of pericardial effusion.    CXR (12/12/19):  FINDINGS: There is no significant change.  The lungs  show diffuse interstitial opacities in perihilar and LEFT lung   base of concerning for infectious pneumonia.  The  heart is enlarged in transverse diameter. No hilar mass. Trachea   midline. Status post TAVR procedure. Cardiac device wire leads are within right   atrium and right ventricle.   Visualized osseous structures are intact.  IMPRESSION:   No interval change..

## 2019-12-17 NOTE — PROGRESS NOTE ADULT - ASSESSMENT
85yo F with AS s/p TAVR, CAD s/p stents, paroxysmal atrial fibrillation s/p PPM, COPD presented from [place] c/o increased b/l LE swelling with associated fatigue and decreased ambulation along with SOB for last 2 weeks. she was found to be in atrial fib on admission , UA positive started on empirical iv abx for suspected UTI . cardiology consulted , pt is also with CHF on admission likely due to rapid atrial fib iv lasix initiated , rate improved Pt had fever 12/12 am sepsis protocol initiated , blood cx ordered , Urine cx is positive MRSA  ,ID consulted  rocephin stopped vanco started . Pt is with normal EF , moderate MR and mod to severe TR     #CHF acute on chronic diastolic CHF     CXR, bnp    IV lasix    cardiology eval   ? aspiration event for fever    #MRSA positive UTI / sepsis resolved    ID eval--finish 7day course of doxycycline      #Atrial fib with RVR      change toprol 50mg to metoprolol 25TID    not on AC due to fall risk and h/o intracranial bleed     #DVT prophylaxis  - heparin SC     debility: PT re eval    suspect needs SONYA     left message for son

## 2019-12-17 NOTE — PROGRESS NOTE ADULT - SUBJECTIVE AND OBJECTIVE BOX
INFECTIOUS DISEASES AND INTERNAL MEDICINE at [place]  =======================================================  Mason Goodman MD  Diplomates American Board of Internal Medicine and Infectious Diseases  Telephone [phone]  Fax            [phone]  =======================================================    VERENICE HUDSON [id number]    Follow up: UTI MRSA    Allergies:  CHOCOLATE ENSURE ONLY (Unknown)  No Known Allergies      Medications:  ascorbic acid 500 milliGRAM(s) Oral daily  aspirin  chewable 81 milliGRAM(s) Oral daily  atorvastatin 20 milliGRAM(s) Oral at bedtime  benzocaine 15 mG/menthol 3.6 mG (Sugar-Free) Lozenge 1 Lozenge Oral every 4 hours PRN  cholecalciferol 1000 Unit(s) Oral daily  folic acid 1 milliGRAM(s) Oral daily  furosemide    Tablet 20 milliGRAM(s) Oral daily  heparin  Injectable 5000 Unit(s) SubCutaneous every 12 hours  influenza   Vaccine 0.5 milliLiter(s) IntraMuscular once  levETIRAcetam 500 milliGRAM(s) Oral two times a day  metoprolol succinate ER 50 milliGRAM(s) Oral daily  potassium chloride    Tablet ER 20 milliEquivalent(s) Oral daily  saccharomyces boulardii 250 milliGRAM(s) Oral two times a day  vancomycin  IVPB 750 milliGRAM(s) IV Intermittent every 12 hours    SOCIAL       FAMILY   FAMILY HISTORY:  No pertinent family history in first degree relatives    REVIEW OF SYSTEMS:  CONSTITUTIONAL:  No Fever or chills  HEENT:   No diplopia or blurred vision.  No earache, sore throat or runny nose.  CARDIOVASCULAR:  No pressure, squeezing, strangling, tightness, heaviness or aching about the chest, neck, axilla or epigastrium.  RESPIRATORY:  No cough, shortness of breath, PND or orthopnea.  GASTROINTESTINAL:  No nausea, vomiting or diarrhea.  GENITOURINARY:  No dysuria, frequency or urgency. No Blood in urine  MUSCULOSKELETAL:   AS PER HPI  SKIN:  No change in skin, hair or nails.  NEUROLOGIC:  No paresthesias, fasciculations, seizures or weakness.  PSYCHIATRIC:  No disorder of thought or mood.  ENDOCRINE:  No heat or cold intolerance, polyuria or polydipsia.  HEMATOLOGICAL:  No easy bruising or bleeding.            Physical Exam:  I Vital Signs Last 24 Hrs  T(C): 36.3 (17 Dec 2019 10:56), Max: 36.7 (16 Dec 2019 21:26)  T(F): 97.4 (17 Dec 2019 10:56), Max: 98.1 (16 Dec 2019 21:26)  HR: 47 (17 Dec 2019 10:56) (47 - 97)  BP: 114/93 (17 Dec 2019 10:56) (100/62 - 120/66)  BP(mean): --  RR: 18 (17 Dec 2019 10:56) (18 - 18)  SpO2: 95% (17 Dec 2019 10:56) (94% - 95%)    GEN: NAD,   HEENT: normocephalic and atraumatic. EOMI. TIFFANY.    NECK: Supple. No carotid bruits.  No lymphadenopathy or thyromegaly.  LUNGS: Clear to auscultation.  HEART: Regular rate and rhythm without murmur.  ABDOMEN: Soft, nontender, and nondistended.  Positive bowel sounds.    : No CVA tenderness  EXTREMITIES: Without any cyanosis, clubbing, rash, lesions or edema.  MSK: no joint swelling  NEUROLOGIC: Cranial nerves II through XII are grossly intact.  PSYCHIATRIC: Appropriate affect .  SKIN: No ulceration or induration present.        Labs:                                              CAPILLARY BLOOD GLUCOSE            RECENT CULTURES:  12-12 @ 04:22 .Blood     No growth at 48 hours        12-12 @ 04:21 .Blood     No growth at 48 hours        12-11 @ 09:40          NotDetec  12-11 @ 07:28 .Urine Methicillin resistant Staphylococcus aureus    >100,000 CFU/ml Methicillin resistant Staphylococcus aureus  .  TYPE: (C=Critical, N=Notification, A=Abnormal) C  TESTS:  _ MRSA  DATE/TIME CALLED: _ 12/13/2019 09:31:00  CALLED TO: _ Vinita Porras RN  READ BACK (2 Patient Identifiers)(Y/N): _ Y  READ BACK VALUES (Y/N): _ Y  CALLED BY: Tatiana Gregory    Sent copy to IC and logistic.

## 2019-12-18 NOTE — SWALLOW BEDSIDE ASSESSMENT ADULT - ASR SWALLOW ASPIRATION MONITOR
gurgly voice/pneumonia/throat clearing/upper respiratory infection/change of breathing pattern/cough/fever/oral hygiene/position upright (89Y)

## 2019-12-18 NOTE — SWALLOW BEDSIDE ASSESSMENT ADULT - SWALLOW EVAL: DIAGNOSIS
Oral & pharyngeal stage of swallow clinically judged to be unremarkable with no overt s/s aspiration

## 2019-12-18 NOTE — PROGRESS NOTE ADULT - ASSESSMENT
85yo F with AS s/p TAVR, CAD s/p stents, paroxysmal atrial fibrillation s/p PPM, COPD presented from [place] c/o increased b/l LE swelling with associated fatigue and decreased ambulation along with SOB for last 2 weeks. she was found to be in atrial fib on admission , UA positive started on empirical iv abx for suspected UTI . cardiology consulted , pt is also with CHF on admission likely due to rapid atrial fib iv lasix initiated , rate improved Pt had fever 12/12 am sepsis protocol initiated , blood cx ordered , Urine cx is positive MRSA  ,ID consulted  rocephin stopped vanco started . Pt is with normal EF , moderate MR and mod to severe TR     #CHF acute on chronic diastolic CHF     CXR improved but likely has chronic changes    recheck BNP    IV lasix to oral lasix    cardiology following     #MRSA positive UTI / sepsis resolved    ID eval--finish 7day course of doxycycline    #Atrial fib with RVR      change toprol 50mg to metoprolol 25TID    bp borderline low so missing doses    ? digoxin/diltiazem    cardiology to comment.    not on AC due to fall risk and h/o intracranial bleed     #DVT prophylaxis  - heparin SC     debility: PT re eval    suspect needs SONYA     updated son at bedside.

## 2019-12-18 NOTE — SWALLOW BEDSIDE ASSESSMENT ADULT - SLP GENERAL OBSERVATIONS
Pt received & seen seated upright in bedside chair, +awake/alert, +oriented, +O2 NC, +RN Vinita montes

## 2019-12-18 NOTE — CHART NOTE - NSCHARTNOTEFT_GEN_A_CORE
Source: Patient [x ]  Family [ ]   other [ ]    Current Diet: Diet, DASH/TLC:   Sodium & Cholesterol Restricted  Supplement Feeding Modality:  Oral  Ensure Clear Cans or Servings Per Day:  3       Frequency:  Three Times a day (12-17-19 @ 16:31)    PO intake:  Pt reports poor po intake at meals, consumes Ensure on occasion.     Current Weight:   (12/18) 144#  (12/15) 156#  (12/10) 153#    % Weight Change -- question accuracy of wts, will continue to monitor.  Aware 1+ trace edema b/l legs    Pertinent Medications: MEDICATIONS  (STANDING):  ascorbic acid 500 milliGRAM(s) Oral daily  aspirin  chewable 81 milliGRAM(s) Oral daily  atorvastatin 20 milliGRAM(s) Oral at bedtime  cholecalciferol 1000 Unit(s) Oral daily  doxycycline hyclate Capsule 100 milliGRAM(s) Oral every 12 hours  folic acid 1 milliGRAM(s) Oral daily  furosemide   Injectable 20 milliGRAM(s) IV Push daily  heparin  Injectable 5000 Unit(s) SubCutaneous every 12 hours  influenza   Vaccine 0.5 milliLiter(s) IntraMuscular once  levETIRAcetam 500 milliGRAM(s) Oral two times a day  metoprolol tartrate 25 milliGRAM(s) Oral every 8 hours  polyethylene glycol 3350 17 Gram(s) Oral daily  potassium chloride    Tablet ER 20 milliEquivalent(s) Oral daily  saccharomyces boulardii 250 milliGRAM(s) Oral two times a day  senna 2 Tablet(s) Oral at bedtime    MEDICATIONS  (PRN):  benzocaine 15 mG/menthol 3.6 mG (Sugar-Free) Lozenge 1 Lozenge Oral every 4 hours PRN Sore Throat  magnesium hydroxide Suspension 30 milliLiter(s) Oral daily PRN Constipation    Pertinent Labs: CBC Full  -  ( 18 Dec 2019 07:06 )  WBC Count : 11.49 K/uL  RBC Count : 5.05 M/uL  Hemoglobin : 15.1 g/dL  Hematocrit : 45.9 %  Platelet Count - Automated : 383 K/uL  Mean Cell Volume : 90.9 fl  Mean Cell Hemoglobin : 29.9 pg  Mean Cell Hemoglobin Concentration : 32.9 gm/dL  12-18 Na136 mmol/L Glu 95 mg/dL K+ 4.2 mmol/L Cr  0.88 mg/dL BUN 23.0 mg/dL<H> Phos n/a   Alb n/a   PAB n/a       Skin: no skin breakdown noted    Nutrition focused physical exam conducted - found signs of malnutrition [ ]absent [x ]present    Subcutaneous fat loss: [x ] Orbital fat pads region, [x ]Buccal fat region, [ ]Triceps region,  [ ]Ribs region    Muscle wasting: [x ]Temples region, [x ]Clavicle region, [x ]Shoulder region, [ ]Scapula region, [ ]Interosseous region,  [ ]thigh region, [ ]Calf region    Estimated Needs:   [x ] no change since previous assessment  [ ] recalculated:     Current Nutrition Diagnosis: Pt remains at nutrition risk secondary to severe protein calorie malnutrition (chronic) related to inability to meet increased protein-energy with decreased appetite in setting of heart failure as evidenced by pt meeting <75% estimated energy needs > 1 month and severe muscle wasting and fat loss.  Pt continues with poor po intake at meals.  Reinforced adequate protein intake at meals to optimize nutrition status and encouraged intake of Ensure.  Pt receptive and agreeable.    Recommendations:   Continue with Ensure Clear tid  RX: MVI daily  Continue with Vit C and Folic Acid daily  Monitor daily wts     Monitoring and Evaluation:   [ x] PO intake [ x] Tolerance to diet prescription [X] Weights  [X] Follow up per protocol [X] Labs:

## 2019-12-18 NOTE — PROGRESS NOTE ADULT - ASSESSMENT
Assessment:  Ms. Ravi is an 84-year old woman with past medical history of Severe Aortic Stenosis s/p TAVR, Coronary artery disease (s/p PCI), Paroxysmal atrial fibrillation (on aspirin only), history of PPM, COPD and urinary tract infections who presented from [facility] with increased leg edema, dyspnea on exertion and fatigue found to have decompensated heart failure.     Patient also noted to have sepsis possible from urinary tract infection. TTE notable for LVEF 70-75%, moderate mitral regurgitation, mild mitral stenosis, well seated bioprosthetic TAVR with acceptable gradients, moderate-severe TR. No indication of valvular vegetation reported.     Recommendations:  [] Volume overload: Patient with less leg edema, but noted to have crackles on lung exam. Awaiting repeat pro BNP to see if any improvement (previously 6824). Renal function stable, would dose Lasix 40 mg IVP daily.   [] Sepsis: Treatment per primary team and Infectious Disease. Patient on antibiotics.   [] Atrial fibrillation: Episodes of RVR, but patient reports being asymptomatic. Continue to hold anticoagulation (as per outpatient cardiologist recommendation due to bleeding risk). Continue ASA 81 mg daily. Can fractionate metoprolol to Metoprolol tartrate 25 mg TID for now.   [] CAD: Stable. EKG with no new ischemia and troponin negative. Continue ASA 81 mg daily, Atorvastatin 20 mg daily       Thank you for the consult. We will follow along.    Ginger Harris MD  Cardiology, [facility] Assessment:  Ms. Ravi is an 84-year old woman with past medical history of Severe Aortic Stenosis s/p TAVR, Coronary artery disease (s/p PCI), Paroxysmal atrial fibrillation (on aspirin only), history of PPM, COPD and urinary tract infections who presented from [facility] with increased leg edema, dyspnea on exertion and fatigue found to have decompensated heart failure.     Patient also noted to have sepsis possible from urinary tract infection. TTE notable for LVEF 70-75%, moderate mitral regurgitation, mild mitral stenosis, well seated bioprosthetic TAVR with acceptable gradients, moderate-severe TR. No indication of valvular vegetation reported.     Recommendations:  [] Volume overload: Patient with less leg edema, but noted to have crackles on lung exam. Awaiting repeat pro BNP to see if any improvement (previously 6824). Renal function stable, would dose Lasix 40 mg IVP daily. With increased diuresis and fluid removal and treatment of infection, HR should improve.   [] Sepsis: Treatment per primary team and Infectious Disease. Patient on antibiotics.   [] Atrial fibrillation: Episodes of RVR, but patient reports being asymptomatic. Continue to hold anticoagulation (as per outpatient cardiologist recommendation due to bleeding risk). Continue ASA 81 mg daily. Can increase metoprolol to Metoprolol tartrate 25 mg QID for now.   [] CAD: Stable. EKG with no new ischemia and troponin negative. Continue ASA 81 mg daily, Atorvastatin 20 mg daily       Thank you for the consult. We will follow along.    Ginger Harris MD  Cardiology, [facility]

## 2019-12-18 NOTE — PROGRESS NOTE ADULT - SUBJECTIVE AND OBJECTIVE BOX
seen for afib, HF    in chair, feels better  ros otherwise negative    MEDICATIONS  (STANDING):  ascorbic acid 500 milliGRAM(s) Oral daily  aspirin  chewable 81 milliGRAM(s) Oral daily  atorvastatin 20 milliGRAM(s) Oral at bedtime  cholecalciferol 1000 Unit(s) Oral daily  doxycycline hyclate Capsule 100 milliGRAM(s) Oral every 12 hours  folic acid 1 milliGRAM(s) Oral daily  furosemide    Tablet 40 milliGRAM(s) Oral daily  heparin  Injectable 5000 Unit(s) SubCutaneous every 12 hours  influenza   Vaccine 0.5 milliLiter(s) IntraMuscular once  levETIRAcetam 500 milliGRAM(s) Oral two times a day  metoprolol tartrate 25 milliGRAM(s) Oral every 8 hours  potassium chloride    Tablet ER 10 milliEquivalent(s) Oral daily  saccharomyces boulardii 250 milliGRAM(s) Oral two times a day  senna 2 Tablet(s) Oral at bedtime    MEDICATIONS  (PRN):  benzocaine 15 mG/menthol 3.6 mG (Sugar-Free) Lozenge 1 Lozenge Oral every 4 hours PRN Sore Throat  magnesium hydroxide Suspension 30 milliLiter(s) Oral daily PRN Constipation      Allergies    No Known Allergies    Intolerances    CHOCOLATE ENSURE ONLY (Unknown)      Vital Signs Last 24 Hrs  T(C): 36.4 (18 Dec 2019 09:50), Max: 36.4 (18 Dec 2019 00:13)  T(F): 97.6 (18 Dec 2019 09:50), Max: 97.6 (18 Dec 2019 00:13)  HR: 74 (18 Dec 2019 09:50) (74 - 102)  BP: 94/60 (18 Dec 2019 10:00) (94/60 - 104/62)  BP(mean): --  RR: 18 (18 Dec 2019 09:50) (18 - 18)  SpO2: 98% (18 Dec 2019 09:50) (94% - 98%)    PHYSICAL EXAM:    GENERAL: NAD  CHEST/LUNG: Coarse bs    HEART: irreg irreg rate and rhythm; S1 S2  ABDOMEN: Soft, Bowel sounds present  EXTREMITIES: trace edema   NERVOUS SYSTEM:  Alert & Oriented X2 confused at times. nonfocal neuro gen weakness    LABS:                        15.1   11.49 )-----------( 383      ( 18 Dec 2019 07:06 )             45.9     12-18    136  |  90<L>  |  23.0<H>  ----------------------------<  95  4.2   |  31.0<H>  |  0.88    Ca    9.0      18 Dec 2019 07:06            CAPILLARY BLOOD GLUCOSE            RADIOLOGY & ADDITIONAL TESTS:

## 2019-12-18 NOTE — SWALLOW BEDSIDE ASSESSMENT ADULT - COMMENTS
As per MD note:   "85yo F with AS s/p TAVR, CAD s/p stents, paroxysmal atrial fibrillation s/p PPM, COPD presented from [place] c/o increased b/l LE swelling with associated fatigue and decreased ambulation along with SOB for last 2 weeks. she was found to be in atrial fib on admission , UA positive started on empirical iv abx for suspected UTI . cardiology consulted , pt is also with CHF on admission likely due to rapid atrial fib iv lasix initiated , rate improved Pt had fever 12/12 am sepsis protocol initiated , blood cx ordered , Urine cx is positive MRSA  ,ID consulted  rocephin stopped vanco started . Pt is with normal EF , moderate MR and mod to severe TR"

## 2019-12-18 NOTE — PROGRESS NOTE ADULT - SUBJECTIVE AND OBJECTIVE BOX
VERENICE PEDROZAOUGHLIN  [id number]      Interval History: The patient reports feeling fatigued.       Tele: atrial fibrillation with rates up to 150 BPM at times      Current Meds:   ascorbic acid 500 milliGRAM(s) Oral daily  aspirin  chewable 81 milliGRAM(s) Oral daily  atorvastatin 20 milliGRAM(s) Oral at bedtime  benzocaine 15 mG/menthol 3.6 mG (Sugar-Free) Lozenge 1 Lozenge Oral every 4 hours PRN  cholecalciferol 1000 Unit(s) Oral daily  doxycycline hyclate Capsule 100 milliGRAM(s) Oral every 12 hours  folic acid 1 milliGRAM(s) Oral daily  furosemide    Tablet 40 milliGRAM(s) Oral daily  heparin  Injectable 5000 Unit(s) SubCutaneous every 12 hours  influenza   Vaccine 0.5 milliLiter(s) IntraMuscular once  levETIRAcetam 500 milliGRAM(s) Oral two times a day  magnesium hydroxide Suspension 30 milliLiter(s) Oral daily PRN  metoprolol tartrate 25 milliGRAM(s) Oral every 8 hours  potassium chloride    Tablet ER 10 milliEquivalent(s) Oral daily  saccharomyces boulardii 250 milliGRAM(s) Oral two times a day  senna 2 Tablet(s) Oral at bedtime      Objective:       Vital Signs:   T(C): 36.4 (12-18-19 @ 15:39), Max: 36.4 (12-18-19 @ 00:13)  HR: 74 (12-18-19 @ 15:39) (74 - 102)  BP: 116/61 (12-18-19 @ 15:39) (94/60 - 116/61)  RR: 18 (12-18-19 @ 15:39) (18 - 18)  SpO2: 91% (12-18-19 @ 15:39) (91% - 98%)  Wt(kg): --    Physical Exam:   General: elderly woman, sitting in bed, no distress  Neck: supple  CVS: JVP ~ 9 cm H20, irregularly irregular, s1, s2, no murmurs   Pulm: unlabored respirations, crackles present  Ext: trace lower extremity edema b/l   Neuro: A&O x3  Psych: Normal affect      Labs:   18 Dec 2019 07:06    136    |  90     |  23.0   ----------------------------<  95     4.2     |  31.0   |  0.88     Ca    9.0        18 Dec 2019 07:06                            15.1   11.49 )-----------( 383      ( 18 Dec 2019 07:06 )             45.9           ECG (12/10/19): atrial fibrillation with RVR at 122 BPM, old inferior infarct (appears similar to old outpatient ECG)    TTE (12/13/19):  Summary:   1. Severely enlarged left atrium.   2. Left ventricular ejection fraction, by visual estimation, is 70 to   75%.   3. Severely enlarged right atrium.   4. The right ventricular size is mildly enlarged. RV systolic function   is normal.   5. Moderate mitral valve regurgitation. Mild mitral stenosis.   6. Bioprosthetic aortic valve. Likely [employer] JOSE. well seated valve.   No leak. Acceptable gradients.   7. Moderate-severe tricuspid regurgitation.   8. There is no evidence of pericardial effusion.    CXR (12/12/19):  FINDINGS: There is no significant change.  The lungs  show diffuse interstitial opacities in perihilar and LEFT lung   base of concerning for infectious pneumonia.  The  heart is enlarged in transverse diameter. No hilar mass. Trachea   midline. Status post TAVR procedure. Cardiac device wire leads are within right   atrium and right ventricle.   Visualized osseous structures are intact.  IMPRESSION:   No interval change..

## 2019-12-19 NOTE — PROGRESS NOTE ADULT - ASSESSMENT
Assessment:  Ms. Ravi is an 84-year old woman with past medical history of Severe Aortic Stenosis s/p TAVR, Coronary artery disease (s/p PCI), Paroxysmal atrial fibrillation (on aspirin only), history of PPM, COPD and urinary tract infections who presented from [facility] with increased leg edema, dyspnea on exertion and fatigue found to have decompensated heart failure.     Patient also noted to have sepsis possible from urinary tract infection. TTE notable for LVEF 70-75%, moderate mitral regurgitation, mild mitral stenosis, well seated bioprosthetic TAVR with acceptable gradients, moderate-severe TR. No indication of valvular vegetation reported.     Recommendations:  [] Volume overload: Patient with less leg edema, but noted to have crackles on lung exam. Repeat BNP 2169 down from 6824. Renal function stable, would continue Lasix 40 mg IVP daily. Patient has some improvement in HR today and BNP has decreased.  [] Sepsis: Treatment per primary team and Infectious Disease. Patient on antibiotics.   [] Atrial fibrillation: Episodes of RVR, but patient reports being asymptomatic. Continue to hold anticoagulation (as per outpatient cardiologist recommendation due to bleeding risk). Continue ASA 81 mg daily. May continue Metoprolol tartrate 25 mg PO TID for now due to borderline low BP  [] CAD: Stable. EKG with no new ischemia and troponin negative. Continue ASA 81 mg daily, Atorvastatin 20 mg daily       Thank you for the consult. We will follow along.    Ginger Harris MD  Cardiology, [facility] Assessment:  Ms. Ravi is an 84-year old woman with past medical history of Severe Aortic Stenosis s/p TAVR, Coronary artery disease (s/p PCI), Paroxysmal atrial fibrillation (on aspirin only), history of PPM, COPD and urinary tract infections who presented from [facility] with increased leg edema, dyspnea on exertion and fatigue found to have decompensated heart failure.     Patient also noted to have sepsis possible from urinary tract infection. TTE notable for LVEF 70-75%, moderate mitral regurgitation, mild mitral stenosis, well seated bioprosthetic TAVR with acceptable gradients, moderate-severe TR. No indication of valvular vegetation reported.     Recommendations:  [] Volume overload: Patient with less leg edema, but noted to have crackles on lung exam. Repeat BNP 2169 down from 6824. Renal function stable, would continue Lasix 40 mg IVP daily. Patient has some improvement in HR today and BNP has decreased.  [] Sepsis: Treatment per primary team and Infectious Disease. Patient on antibiotics.   [] Atrial fibrillation: Episodes of RVR. Continue to hold anticoagulation (as per outpatient cardiologist recommendation due to bleeding risk). Continue ASA 81 mg daily. May continue Metoprolol tartrate 25 mg PO TID for now due to borderline low BP.  [] CAD: Stable. EKG with no new ischemia and troponin negative. Continue ASA 81 mg daily, Atorvastatin 20 mg daily       Thank you for the consult. We will follow along.    Ginger Harris MD  Cardiology, [facility]

## 2019-12-19 NOTE — PROGRESS NOTE ADULT - ASSESSMENT
85yo F with AS s/p TAVR, CAD s/p stents, paroxysmal atrial fibrillation s/p PPM, COPD presented from [place] c/o increased b/l LE swelling with associated fatigue and decreased ambulation along with SOB for last 2 weeks. she was found to be in atrial fib on admission , UA positive started on empirical iv abx for suspected UTI . cardiology consulted , pt is also with CHF on admission likely due to rapid atrial fib iv lasix initiated , rate improved Pt had fever 12/12 am sepsis protocol initiated , blood cx ordered , Urine cx is positive MRSA  ,ID consulted  rocephin stopped vanco started and to complete doxycycline.  Pt is with normal EF , moderate MR and mod to severe TR.  ultimately PANCHO discharge    #CHF acute on chronic diastolic CHF     CXR improved but likely has chronic changes    BNP downtrending     IV lasix     cardiology following     #MRSA positive UTI / sepsis resolved    ID eval--finish course of doxycycline for total 7 day course of abx     #Atrial fib with RVR      change toprol 50mg to metoprolol 25TID    bp borderline low so missing doses    ? digoxin/diltiazem    not on AC due to fall risk and h/o intracranial bleed     #DVT prophylaxis  - heparin SC     debility: PT following  ultimately pancho    dispo once HF and HR improved

## 2019-12-19 NOTE — PROGRESS NOTE ADULT - SUBJECTIVE AND OBJECTIVE BOX
seen for afib, hf, uti    complaining of gen weakness  ros negative    tele with afib with RVR       MEDICATIONS  (STANDING):  ascorbic acid 500 milliGRAM(s) Oral daily  aspirin  chewable 81 milliGRAM(s) Oral daily  atorvastatin 20 milliGRAM(s) Oral at bedtime  cholecalciferol 1000 Unit(s) Oral daily  doxycycline hyclate Capsule 100 milliGRAM(s) Oral every 12 hours  folic acid 1 milliGRAM(s) Oral daily  furosemide   Injectable 40 milliGRAM(s) IV Push daily  heparin  Injectable 5000 Unit(s) SubCutaneous every 12 hours  influenza   Vaccine 0.5 milliLiter(s) IntraMuscular once  levETIRAcetam 500 milliGRAM(s) Oral two times a day  metoprolol tartrate 25 milliGRAM(s) Oral every 8 hours  potassium chloride    Tablet ER 10 milliEquivalent(s) Oral daily  saccharomyces boulardii 250 milliGRAM(s) Oral two times a day  senna 2 Tablet(s) Oral at bedtime    MEDICATIONS  (PRN):  benzocaine 15 mG/menthol 3.6 mG (Sugar-Free) Lozenge 1 Lozenge Oral every 4 hours PRN Sore Throat  magnesium hydroxide Suspension 30 milliLiter(s) Oral daily PRN Constipation      Allergies    No Known Allergies    Intolerances    CHOCOLATE ENSURE ONLY (Unknown)      Vital Signs Last 24 Hrs  T(C): 36.6 (19 Dec 2019 06:16), Max: 36.6 (19 Dec 2019 06:16)  T(F): 97.8 (19 Dec 2019 06:16), Max: 97.8 (19 Dec 2019 06:16)  HR: 113 (19 Dec 2019 06:16) (74 - 113)  BP: 102/68 (19 Dec 2019 06:16) (94/68 - 116/61)  BP(mean): --  RR: 18 (19 Dec 2019 06:16) (18 - 18)  SpO2: 96% (19 Dec 2019 06:16) (91% - 96%)    PHYSICAL EXAM:    GENERAL: NAD frail/debilitated   CHEST/LUNG: crackles at bases  no wheeze   HEART: irreg irreg rate and rhythm; S1 S2  ABDOMEN: Soft,  Bowel sounds present  EXTREMITIES: trace edema   NERVOUS SYSTEM:  alert/responsive. gen weakness    LABS:                        15.5   9.48  )-----------( 342      ( 19 Dec 2019 06:30 )             48.4     12-19    136  |  91<L>  |  24.0<H>  ----------------------------<  90  4.5   |  33.0<H>  |  0.98    Ca    9.3      19 Dec 2019 06:30            CAPILLARY BLOOD GLUCOSE            RADIOLOGY & ADDITIONAL TESTS:

## 2019-12-20 NOTE — PROGRESS NOTE ADULT - SUBJECTIVE AND OBJECTIVE BOX
VERENICE [facility]  [id number]      Interval History: The patient continues to have fatigue. Reports mild palpitations.       Tele: atrial fibrillation with HR in 110s      Current Meds:   ascorbic acid 500 milliGRAM(s) Oral daily  aspirin  chewable 81 milliGRAM(s) Oral daily  atorvastatin 20 milliGRAM(s) Oral at bedtime  benzocaine 15 mG/menthol 3.6 mG (Sugar-Free) Lozenge 1 Lozenge Oral every 4 hours PRN  cholecalciferol 1000 Unit(s) Oral daily  digoxin     Tablet 0.125 milliGRAM(s) Oral daily  doxycycline hyclate Capsule 100 milliGRAM(s) Oral every 12 hours  folic acid 1 milliGRAM(s) Oral daily  furosemide   Injectable 40 milliGRAM(s) IV Push daily  heparin  Injectable 5000 Unit(s) SubCutaneous every 12 hours  influenza   Vaccine 0.5 milliLiter(s) IntraMuscular once  levETIRAcetam 500 milliGRAM(s) Oral two times a day  magnesium hydroxide Suspension 30 milliLiter(s) Oral daily PRN  metoprolol tartrate 25 milliGRAM(s) Oral every 8 hours  mupirocin 2% Nasal 1 Application(s) Nasal two times a day  potassium chloride    Tablet ER 10 milliEquivalent(s) Oral daily  saccharomyces boulardii 250 milliGRAM(s) Oral two times a day  senna 2 Tablet(s) Oral at bedtime      Objective:     Vital Signs:   T(C): 36.6 (12-20-19 @ 09:44), Max: 36.6 (12-20-19 @ 06:17)  HR: 110 (12-20-19 @ 09:44) (78 - 121)  BP: 106/64 (12-20-19 @ 07:42) (97/73 - 114/73)  RR: 18 (12-20-19 @ 09:44) (18 - 18)  SpO2: 98% (12-20-19 @ 09:44) (96% - 98%)  Wt(kg): --    Physical Exam:   General: elderly woman, sitting in chair, no distress  Neck: supple  CVS: JVP ~ 9 cm H20, irregularly irregular, s1, s2, no murmurs   Pulm: unlabored respirations, crackles present  Ext: trace lower extremity edema b/l   Neuro: A&O x3  Psych: Normal affect      Labs:   20 Dec 2019 06:24    134    |  90     |  22.0   ----------------------------<  86     3.7     |  31.0   |  0.78     Ca    9.3        20 Dec 2019 06:24  Phos  3.3       20 Dec 2019 06:24  Mg     1.9       20 Dec 2019 06:24                            15.5   9.48  )-----------( 342      ( 19 Dec 2019 06:30 )             48.4           ECG (12/10/19): atrial fibrillation with RVR at 122 BPM, old inferior infarct (appears similar to old outpatient ECG)    TTE (12/13/19):  Summary:   1. Severely enlarged left atrium.   2. Left ventricular ejection fraction, by visual estimation, is 70 to 75%.   3. Severely enlarged right atrium.   4. The right ventricular size is mildly enlarged. RV systolic function   is normal.   5. Moderate mitral valve regurgitation. Mild mitral stenosis.   6. Bioprosthetic aortic valve. Likely [employer] JOSE. well seated valve.   No leak. Acceptable gradients.   7. Moderate-severe tricuspid regurgitation.   8. There is no evidence of pericardial effusion.    CXR (12/12/19):  FINDINGS: There is no significant change.  The lungs  show diffuse interstitial opacities in perihilar and LEFT lung   base of concerning for infectious pneumonia.  The  heart is enlarged in transverse diameter. No hilar mass. Trachea   midline. Status post TAVR procedure. Cardiac device wire leads are within right   atrium and right ventricle.   Visualized osseous structures are intact.  IMPRESSION:   No interval change..

## 2019-12-20 NOTE — PROGRESS NOTE ADULT - ASSESSMENT
83yo F with AS s/p TAVR, CAD s/p stents, paroxysmal atrial fibrillation s/p PPM, COPD presented from [place] c/o increased b/l LE swelling with associated fatigue and decreased ambulation along with SOB for last 2 weeks. she was found to be in atrial fib on admission , UA positive started on empirical iv abx for suspected UTI . cardiology consulted , pt is also with CHF on admission likely due to rapid atrial fib iv lasix initiated , rate improved Pt had fever 12/12 am sepsis protocol initiated , blood cx ordered , Urine cx is positive MRSA  ,ID consulted  rocephin stopped vanco started and to complete doxycycline.  Pt is with normal EF , moderate MR and mod to severe TR.  ultimately PANCHO discharge    #CHF acute on chronic diastolic CHF     CXR improved but likely has chronic changes    BNP downtrending     IV lasix     cardiology following    check CT chest to r/o pneumonia    #MRSA positive UTI / sepsis resolved    ID eval--finish course of doxycycline for total 7 day course of abx     #Atrial fib with RVR      change toprol 50mg to metoprolol 25TID    bp borderline low so missing doses    digoxin added    not on AC due to fall risk and h/o intracranial bleed     #DVT prophylaxis  - heparin SC     debility: PT following  ultimately pancho    dispo once HF and HR improved     d/w family at bedside.

## 2019-12-20 NOTE — PROGRESS NOTE ADULT - ASSESSMENT
Assessment:  Ms. Ravi is an 84-year old woman with past medical history of Severe Aortic Stenosis s/p TAVR, Coronary artery disease (s/p PCI), Paroxysmal atrial fibrillation (on aspirin only), history of PPM, COPD and urinary tract infections who presented from [facility] with increased leg edema, dyspnea on exertion and fatigue found to have decompensated heart failure.     Patient also noted to have sepsis possible from urinary tract infection. TTE notable for LVEF 70-75%, moderate mitral regurgitation, mild mitral stenosis, well seated bioprosthetic TAVR with acceptable gradients, moderate-severe TR. No indication of valvular vegetation reported.     Recommendations:  [] Volume overload: Patient with crackles on exam. Repeat BNP 2169 down from 6824. Renal function stable, would increase Lasix to 40 mg IVP BID today.   [] Sepsis: Treatment per primary team and Infectious Disease. Patient on antibiotics.   [] Atrial fibrillation: Episodes of RVR. TSH normal. Will add digoxin today. Plan to check digoxin level in 2 days. Continue to hold anticoagulation (as per outpatient cardiologist recommendation due to bleeding risk). Continue ASA 81 mg daily. May continue Metoprolol tartrate 25 mg PO TID for now due to borderline low BP.  [] CAD: Stable. EKG with no new ischemia and troponin negative. Continue ASA 81 mg daily, Atorvastatin 20 mg daily       Thank you for the consult. We will follow along.    Ginger Harris MD  Cardiology, [facility] Assessment:  Ms. Ravi is an 84-year old woman with past medical history of Severe Aortic Stenosis s/p TAVR, Coronary artery disease (s/p PCI), Paroxysmal atrial fibrillation (on aspirin only), history of PPM, COPD and urinary tract infections who presented from [facility] with increased leg edema, dyspnea on exertion and fatigue found to have decompensated heart failure.     Patient also noted to have sepsis possible from urinary tract infection. TTE notable for LVEF 70-75%, moderate mitral regurgitation, mild mitral stenosis, well seated bioprosthetic TAVR with acceptable gradients, moderate-severe TR. No indication of valvular vegetation reported.     Recommendations:  [] Volume overload: Patient with crackles on exam. Repeat BNP 2169 down from 6824. Renal function stable, would increase Lasix to 40 mg IVP BID today.   [] Sepsis: Treatment per primary team and Infectious Disease. Patient on antibiotics.   [] Atrial fibrillation: Episodes of RVR. TSH normal. Will add digoxin today. Plan to check digoxin level in 2 days. Continue to hold anticoagulation (as per outpatient cardiologist recommendation due to bleeding risk). Continue ASA 81 mg daily. May continue Metoprolol tartrate 25 mg PO TID for now due to borderline low BP. Patient likely not candidate for EP procedures as she cannot be on anticoagulation due to fall risk/bleeding risk.   [] CAD: Stable. EKG with no new ischemia and troponin negative. Continue ASA 81 mg daily, Atorvastatin 20 mg daily       Thank you for the consult. We will follow along.    Ginger Harris MD  Cardiology, [facility]

## 2019-12-20 NOTE — PROGRESS NOTE ADULT - SUBJECTIVE AND OBJECTIVE BOX
seen for afib, HF    weak today  no cough  tolerating diet  ros negative     MEDICATIONS  (STANDING):  ascorbic acid 500 milliGRAM(s) Oral daily  aspirin  chewable 81 milliGRAM(s) Oral daily  atorvastatin 20 milliGRAM(s) Oral at bedtime  cholecalciferol 1000 Unit(s) Oral daily  digoxin     Tablet 0.125 milliGRAM(s) Oral daily  doxycycline hyclate Capsule 100 milliGRAM(s) Oral every 12 hours  folic acid 1 milliGRAM(s) Oral daily  furosemide   Injectable 40 milliGRAM(s) IV Push daily  heparin  Injectable 5000 Unit(s) SubCutaneous every 12 hours  influenza   Vaccine 0.5 milliLiter(s) IntraMuscular once  levETIRAcetam 500 milliGRAM(s) Oral two times a day  metoprolol tartrate 25 milliGRAM(s) Oral every 8 hours  mupirocin 2% Nasal 1 Application(s) Nasal two times a day  potassium chloride    Tablet ER 10 milliEquivalent(s) Oral daily  saccharomyces boulardii 250 milliGRAM(s) Oral two times a day  senna 2 Tablet(s) Oral at bedtime    MEDICATIONS  (PRN):  benzocaine 15 mG/menthol 3.6 mG (Sugar-Free) Lozenge 1 Lozenge Oral every 4 hours PRN Sore Throat  magnesium hydroxide Suspension 30 milliLiter(s) Oral daily PRN Constipation      Allergies    No Known Allergies    Intolerances    CHOCOLATE ENSURE ONLY (Unknown)      Vital Signs Last 24 Hrs  T(C): 36.4 (20 Dec 2019 15:49), Max: 36.6 (20 Dec 2019 06:17)  T(F): 97.5 (20 Dec 2019 15:49), Max: 97.9 (20 Dec 2019 06:17)  HR: 105 (20 Dec 2019 15:49) (78 - 112)  BP: 113/83 (20 Dec 2019 15:49) (90/62 - 114/73)  BP(mean): --  RR: 18 (20 Dec 2019 15:49) (18 - 18)  SpO2: 95% (20 Dec 2019 15:49) (95% - 98%)    PHYSICAL EXAM:    GENERAL: NAD  CHEST/LUNG: crackles at bases R>L  HEART: irreg irreg rate and rhythm; S1 S2  ABDOMEN: Soft, Nontender, Bowel sounds present  EXTREMITIES: no edema   NERVOUS SYSTEM:  Alert & Oriented X3, gen weakness nonfocal     LABS:                        15.5   9.48  )-----------( 342      ( 19 Dec 2019 06:30 )             48.4     12-20    134<L>  |  90<L>  |  22.0<H>  ----------------------------<  86  3.7   |  31.0<H>  |  0.78    Ca    9.3      20 Dec 2019 06:24  Phos  3.3     12-20  Mg     1.9     12-20            CAPILLARY BLOOD GLUCOSE            RADIOLOGY & ADDITIONAL TESTS:

## 2019-12-21 NOTE — PROGRESS NOTE ADULT - ASSESSMENT
Assessment:  Ms. Ravi is an 84-year old woman with past medical history of Severe Aortic Stenosis s/p TAVR, Coronary artery disease (s/p PCI), Paroxysmal atrial fibrillation (on aspirin only), history of PPM, COPD and urinary tract infections who presented from [facility] with increased leg edema, dyspnea on exertion and fatigue found to have decompensated heart failure.     Patient also noted to have sepsis possible from urinary tract infection. TTE notable for LVEF 70-75%, moderate mitral regurgitation, mild mitral stenosis, well seated bioprosthetic TAVR with acceptable gradients, moderate-severe TR. No indication of valvular vegetation reported.     Recommendations:  [] Volume overload: Patient with crackles on exam. Repeat BNP 2169 down from 6824. Renal function stable, would give Lasix 40 mg IVP once today. CT chest with significant bilateral lung fibrosis, consider Pulmonary consult. Will repeat BNP.  [] Sepsis: Treatment per primary team and Infectious Disease. Patient on antibiotics.   [] Atrial fibrillation: Episodes of RVR. TSH normal. Continue Digoxin 0.125 mg daily. Increase Metoprolol tartrate to 25 mg PO QID today. Continue to hold anticoagulation (as per outpatient cardiologist recommendation due to bleeding risk). Continue ASA 81 mg daily. Patient likely not candidate for EP procedures as she cannot be on anticoagulation due to fall risk/bleeding risk and her current ill state.   [] CAD: Stable. EKG with no new ischemia and troponin negative. Continue ASA 81 mg daily, Atorvastatin 20 mg daily       Thank you for the consult. We will follow along.    Ginger Harris MD  Cardiology, [facility] Assessment:  Ms. Ravi is an 84-year old woman with past medical history of Severe Aortic Stenosis s/p TAVR, Coronary artery disease (s/p PCI), Paroxysmal atrial fibrillation (on aspirin only), history of PPM, COPD and urinary tract infections who presented from [facility] with increased leg edema, dyspnea on exertion and fatigue found to have decompensated heart failure.     Patient also noted to have sepsis possible from urinary tract infection. TTE notable for LVEF 70-75%, moderate mitral regurgitation, mild mitral stenosis, well seated bioprosthetic TAVR with acceptable gradients, moderate-severe TR. No indication of valvular vegetation reported.     Recommendations:  [] Volume overload: Patient with crackles on exam. Repeat BNP 2169 down from 6824. Renal function stable, would give Lasix 40 mg IVP once today. CT chest with significant bilateral lung fibrosis, consider Pulmonary consult. Will repeat BNP.  [] Sepsis: Treatment per primary team and Infectious Disease. Patient on antibiotics.   [] Atrial fibrillation: Episodes of RVR. TSH normal. Continue Digoxin 0.125 mg daily. Will transition to Metoprolol succinate today. Continue to hold anticoagulation (as per outpatient cardiologist recommendation due to bleeding risk). Continue ASA 81 mg daily. Patient likely not candidate for EP procedures as she cannot be on anticoagulation due to fall risk/bleeding risk and her current ill state.   [] CAD: Stable. EKG with no new ischemia and troponin negative. Continue ASA 81 mg daily, Atorvastatin 20 mg daily       Thank you for the consult. We will follow along.    Ginger Harris MD  Cardiology, [facility]

## 2019-12-21 NOTE — PROGRESS NOTE ADULT - SUBJECTIVE AND OBJECTIVE BOX
seen for Afib, HF    no acute complaitns.  gen weakness, aches/pains.  ros negative    tele with improved HR     MEDICATIONS  (STANDING):  ascorbic acid 500 milliGRAM(s) Oral daily  aspirin  chewable 81 milliGRAM(s) Oral daily  atorvastatin 20 milliGRAM(s) Oral at bedtime  cholecalciferol 1000 Unit(s) Oral daily  digoxin     Tablet 0.125 milliGRAM(s) Oral daily  folic acid 1 milliGRAM(s) Oral daily  furosemide   Injectable 40 milliGRAM(s) IV Push daily  heparin  Injectable 5000 Unit(s) SubCutaneous every 12 hours  influenza   Vaccine 0.5 milliLiter(s) IntraMuscular once  levETIRAcetam 500 milliGRAM(s) Oral two times a day  metoprolol tartrate 25 milliGRAM(s) Oral every 8 hours  mupirocin 2% Nasal 1 Application(s) Nasal two times a day  potassium chloride    Tablet ER 10 milliEquivalent(s) Oral daily  saccharomyces boulardii 250 milliGRAM(s) Oral two times a day  senna 2 Tablet(s) Oral at bedtime    MEDICATIONS  (PRN):  benzocaine 15 mG/menthol 3.6 mG (Sugar-Free) Lozenge 1 Lozenge Oral every 4 hours PRN Sore Throat  magnesium hydroxide Suspension 30 milliLiter(s) Oral daily PRN Constipation      Allergies    No Known Allergies    Intolerances    CHOCOLATE ENSURE ONLY (Unknown)      Vital Signs Last 24 Hrs  T(C): 36.6 (21 Dec 2019 09:55), Max: 36.6 (21 Dec 2019 09:55)  T(F): 97.8 (21 Dec 2019 09:55), Max: 97.8 (21 Dec 2019 09:55)  HR: 104 (21 Dec 2019 09:55) (94 - 112)  BP: 99/50 (21 Dec 2019 10:20) (90/62 - 113/83)  BP(mean): --  RR: 16 (21 Dec 2019 09:55) (16 - 18)  SpO2: 96% (21 Dec 2019 09:55) (95% - 98%)    PHYSICAL EXAM:    GENERAL: NAD frail   CHEST/LUNG: improved bibasilar crackles no wheezing   HEART: irreg irreg rate and rhythm; S1 S2  ABDOMEN: Soft, Bowel sounds present  EXTREMITIES:  no edema   NERVOUS SYSTEM:  Alert & Oriented X3,gen weakness     LABS:                        14.9   10.48 )-----------( 334      ( 21 Dec 2019 08:59 )             46.6     12-21    136  |  92<L>  |  26.0<H>  ----------------------------<  97  3.5   |  29.0  |  0.89    Ca    9.2      21 Dec 2019 08:59  Phos  3.3     12-20  Mg     1.9     12-20            CAPILLARY BLOOD GLUCOSE            RADIOLOGY & ADDITIONAL TESTS:

## 2019-12-21 NOTE — PROGRESS NOTE ADULT - SUBJECTIVE AND OBJECTIVE BOX
VERENICE PEDROZAOUGHLIN  [id number]    Interval History: The patient reports episodes of dyspnea.      Tele: atrial fibrillation HR up to 130s at times      Current meds:   ascorbic acid 500 milliGRAM(s) Oral daily  aspirin  chewable 81 milliGRAM(s) Oral daily  atorvastatin 20 milliGRAM(s) Oral at bedtime  benzocaine 15 mG/menthol 3.6 mG (Sugar-Free) Lozenge 1 Lozenge Oral every 4 hours PRN  cholecalciferol 1000 Unit(s) Oral daily  digoxin     Tablet 0.125 milliGRAM(s) Oral daily  folic acid 1 milliGRAM(s) Oral daily  furosemide   Injectable 40 milliGRAM(s) IV Push daily  heparin  Injectable 5000 Unit(s) SubCutaneous every 12 hours  influenza   Vaccine 0.5 milliLiter(s) IntraMuscular once  levETIRAcetam 500 milliGRAM(s) Oral two times a day  magnesium hydroxide Suspension 30 milliLiter(s) Oral daily PRN  metoprolol tartrate 25 milliGRAM(s) Oral every 8 hours  mupirocin 2% Nasal 1 Application(s) Nasal two times a day  potassium chloride    Tablet ER 10 milliEquivalent(s) Oral daily  saccharomyces boulardii 250 milliGRAM(s) Oral two times a day  senna 2 Tablet(s) Oral at bedtime      Objective:    Vital Signs:  T(C): 36.6 (12-21-19 @ 09:55), Max: 36.6 (12-21-19 @ 09:55)  HR: 115 (12-21-19 @ 13:19) (94 - 115)  BP: 105/60 (12-21-19 @ 13:19) (97/69 - 113/83)  RR: 16 (12-21-19 @ 09:55) (16 - 18)  SpO2: 96% (12-21-19 @ 09:55) (95% - 98%)  Wt(kg): --    Physical Exam:   General: elderly woman, sitting in chair, no distress  Neck: supple  CVS: JVP ~ 9 cm H20, irregularly irregular, s1, s2, no murmurs   Pulm: unlabored respirations, crackles present  Ext: trace lower extremity edema b/l   Neuro: A&O x3  Psych: Normal affect    Labs:   21 Dec 2019 08:59    136    |  92     |  26.0   ----------------------------<  97     3.5     |  29.0   |  0.89     Ca    9.2        21 Dec 2019 08:59  Phos  3.3       20 Dec 2019 06:24  Mg     1.9       20 Dec 2019 06:24                            14.9   10.48 )-----------( 334      ( 21 Dec 2019 08:59 )             46.6             ECG (12/10/19): atrial fibrillation with RVR at 122 BPM, old inferior infarct (appears similar to old outpatient ECG)    TTE (12/13/19):  Summary:   1. Severely enlarged left atrium.   2. Left ventricular ejection fraction, by visual estimation, is 70 to 75%.   3. Severely enlarged right atrium.   4. The right ventricular size is mildly enlarged. RV systolic function   is normal.   5. Moderate mitral valve regurgitation. Mild mitral stenosis.   6. Bioprosthetic aortic valve. Likely [employer] JOSE. well seated valve.   No leak. Acceptable gradients.   7. Moderate-severe tricuspid regurgitation.   8. There is no evidence of pericardial effusion.    CXR (12/12/19):  FINDINGS: There is no significant change.  The lungs  show diffuse interstitial opacities in perihilar and LEFT lung   base of concerning for infectious pneumonia.  The  heart is enlarged in transverse diameter. No hilar mass. Trachea   midline. Status post TAVR procedure. Cardiac device wire leads are within right   atrium and right ventricle.   Visualized osseous structures are intact.  IMPRESSION:   No interval change..    CT Chest (12/21/19):  Bilateral interstitial lung peripheral lung fibrosis without large airspace consolidation or effusion. No pneumothorax.    Cardiomegaly with calcified mitral annulus, prosthetic aortic valve in place. LEFT atrial enlargement.. Cardiac device wire leads are within right atrium and right ventricle.

## 2019-12-22 NOTE — PROGRESS NOTE ADULT - SUBJECTIVE AND OBJECTIVE BOX
seen for afib/HF    no acute complaints  overall weak, poor po intake--change to ensure puddings  no chest pain/cough  ros negative    MEDICATIONS  (STANDING):  ascorbic acid 500 milliGRAM(s) Oral daily  aspirin  chewable 81 milliGRAM(s) Oral daily  atorvastatin 20 milliGRAM(s) Oral at bedtime  cholecalciferol 1000 Unit(s) Oral daily  digoxin     Tablet 0.125 milliGRAM(s) Oral daily  folic acid 1 milliGRAM(s) Oral daily  furosemide   Injectable 40 milliGRAM(s) IV Push daily  heparin  Injectable 5000 Unit(s) SubCutaneous every 12 hours  influenza   Vaccine 0.5 milliLiter(s) IntraMuscular once  levETIRAcetam 500 milliGRAM(s) Oral two times a day  metoprolol succinate ER 50 milliGRAM(s) Oral daily  mupirocin 2% Nasal 1 Application(s) Nasal two times a day  potassium chloride    Tablet ER 10 milliEquivalent(s) Oral daily  saccharomyces boulardii 250 milliGRAM(s) Oral two times a day  senna 2 Tablet(s) Oral at bedtime    MEDICATIONS  (PRN):  benzocaine 15 mG/menthol 3.6 mG (Sugar-Free) Lozenge 1 Lozenge Oral every 4 hours PRN Sore Throat  magnesium hydroxide Suspension 30 milliLiter(s) Oral daily PRN Constipation      Allergies    No Known Allergies    Intolerances    CHOCOLATE ENSURE ONLY (Unknown)      Vital Signs Last 24 Hrs  T(C): 36.4 (22 Dec 2019 06:20), Max: 36.5 (21 Dec 2019 20:50)  T(F): 97.5 (22 Dec 2019 06:20), Max: 97.7 (21 Dec 2019 20:50)  HR: 81 (22 Dec 2019 08:44) (80 - 115)  BP: 105/61 (22 Dec 2019 06:20) (92/68 - 112/74)  BP(mean): --  RR: 16 (22 Dec 2019 08:44) (16 - 18)  SpO2: 98% (22 Dec 2019 08:44) (97% - 99%)    PHYSICAL EXAM:    GENERAL: NAD frail   CHEST/LUNG: crackles at bases, improved   HEART: irreg irreg rate and rhythm; S1 S2  ABDOMEN: Soft, Nontender, Nondistended; Bowel sounds present  EXTREMITIES: no edema   NERVOUS SYSTEM:  Alert & Oriented X3, nonfocal gen weak    LABS:                        15.7   15.22 )-----------( 285      ( 22 Dec 2019 06:17 )             48.8     12-22    134<L>  |  90<L>  |  27.0<H>  ----------------------------<  97  3.6   |  29.0  |  0.96    Ca    9.2      22 Dec 2019 06:17  Mg     1.9     12-22            CAPILLARY BLOOD GLUCOSE            RADIOLOGY & ADDITIONAL TESTS:

## 2019-12-22 NOTE — PROGRESS NOTE ADULT - ASSESSMENT
Assessment:  Ms. Ravi is an 84-year old woman with past medical history of Severe Aortic Stenosis s/p TAVR, Coronary artery disease (s/p PCI), Paroxysmal atrial fibrillation (on aspirin only), history of PPM, COPD and urinary tract infections who presented from [facility] with increased leg edema, dyspnea on exertion and fatigue found to have decompensated heart failure.     Patient also noted to have sepsis possible from urinary tract infection. TTE notable for LVEF 70-75%, moderate mitral regurgitation, mild mitral stenosis, well seated bioprosthetic TAVR with acceptable gradients, moderate-severe TR. No indication of valvular vegetation reported.     Recommendations:  [] Volume overload: Patient with crackles on exam. Repeat BNP 2169 down from 6824. Renal function stable, continue Lasix 40 mg IVP daily. CT chest with significant bilateral lung fibrosis, consider Pulmonary consult. Will re-order BNP  [] Sepsis: Treatment per primary team and Infectious Disease.   [] Atrial fibrillation: Now rates controlled s/p digoxin loading. Continue digoxin 0.125 mg daily. Will check digoxin level. TSH normal.  Continue metoprolol succinate 50 mg daily. Continue to hold anticoagulation (as per outpatient cardiologist recommendation due to bleeding risk). Continue ASA 81 mg daily. Patient likely not candidate for EP procedures as she cannot be on anticoagulation due to fall risk/bleeding risk and her current ill state.   [] CAD: Stable. EKG with no new ischemia and troponin negative. Continue ASA 81 mg daily, Atorvastatin 20 mg daily       Thank you for the consult. We will follow along.    Ginger Harris MD  Cardiology, [facility]

## 2019-12-22 NOTE — PROGRESS NOTE ADULT - ASSESSMENT
83yo F with AS s/p TAVR, CAD s/p stents, paroxysmal atrial fibrillation s/p PPM, COPD presented from [place] c/o increased b/l LE swelling with associated fatigue and decreased ambulation along with SOB for last 2 weeks. she was found to be in atrial fib on admission , UA positive started on empirical iv abx for suspected UTI . cardiology consulted , pt is also with CHF on admission likely due to rapid atrial fib iv lasix initiated , rate improved Pt had fever 12/12 am sepsis protocol initiated , blood cx ordered , Urine cx is positive MRSA  ,ID consulted  rocephin stopped vanco started and to complete doxycycline.  Pt is with normal EF , moderate MR and mod to severe TR.  ultimately PANCHO discharge    #CHF acute on chronic diastolic CHF     CXR improved but likely has chronic changes    BNP downtrending     IV lasix     cardiology following     #pulmonary fibrosis on CT    pulm eval for any input, no in acute flare up    #MRSA positive UTI / sepsis resolved    ID eval--completed 7 day course of vancomycin/doxycyline.    #Atrial fib with RVR ---improved      change toprol 50mg to metoprolol 25TID and now back to toprol    s/p dig load and c/w dig daily    bp borderline low so missing doses    not on AC due to fall risk and h/o intracranial bleed     #DVT prophylaxis  - heparin SC     debility: PT following  ultimately pancho    dispo once HF and HR improved

## 2019-12-22 NOTE — PROGRESS NOTE ADULT - SUBJECTIVE AND OBJECTIVE BOX
VERENICE PEDROZAOUGHLIN  [id number]      Interval History: The patient is fatigued today but reports no further palpitations.      Tele: atrial fibrillation with controlled ventricular rates at 90s BPM      Current Meds:   ascorbic acid 500 milliGRAM(s) Oral daily  aspirin  chewable 81 milliGRAM(s) Oral daily  atorvastatin 20 milliGRAM(s) Oral at bedtime  benzocaine 15 mG/menthol 3.6 mG (Sugar-Free) Lozenge 1 Lozenge Oral every 4 hours PRN  cholecalciferol 1000 Unit(s) Oral daily  digoxin     Tablet 0.125 milliGRAM(s) Oral daily  folic acid 1 milliGRAM(s) Oral daily  furosemide   Injectable 40 milliGRAM(s) IV Push daily  heparin  Injectable 5000 Unit(s) SubCutaneous every 12 hours  influenza   Vaccine 0.5 milliLiter(s) IntraMuscular once  levETIRAcetam 500 milliGRAM(s) Oral two times a day  magnesium hydroxide Suspension 30 milliLiter(s) Oral daily PRN  metoprolol succinate ER 50 milliGRAM(s) Oral daily  mupirocin 2% Nasal 1 Application(s) Nasal two times a day  potassium chloride    Tablet ER 10 milliEquivalent(s) Oral daily  saccharomyces boulardii 250 milliGRAM(s) Oral two times a day  senna 2 Tablet(s) Oral at bedtime        Objective:    Vital Signs:  T(C): 36.3 (12-22-19 @ 11:16), Max: 36.5 (12-21-19 @ 20:50)  HR: 85 (12-22-19 @ 11:23) (79 - 109)  BP: 107/73 (12-22-19 @ 11:16) (92/68 - 112/74)  RR: 18 (12-22-19 @ 11:23) (16 - 18)  SpO2: 95% (12-22-19 @ 11:23) (95% - 99%)  Wt(kg): --      Physical Exam:   General: elderly woman, sitting in chair, no distress  Neck: supple  CVS: JVP ~ 9 cm H20, irregularly irregular, s1, s2, no murmurs   Pulm: unlabored respirations, crackles present  Ext: trace lower extremity edema b/l   Neuro: A&O x3  Psych: Normal affect    Labs:   22 Dec 2019 06:17    134    |  90     |  27.0   ----------------------------<  97     3.6     |  29.0   |  0.96     Ca    9.2        22 Dec 2019 06:17  Mg     1.9       22 Dec 2019 06:17                            15.7   15.22 )-----------( 285      ( 22 Dec 2019 06:17 )             48.8           ECG (12/10/19): atrial fibrillation with RVR at 122 BPM, old inferior infarct (appears similar to old outpatient ECG)    TTE (12/13/19):  Summary:   1. Severely enlarged left atrium.   2. Left ventricular ejection fraction, by visual estimation, is 70 to 75%.   3. Severely enlarged right atrium.   4. The right ventricular size is mildly enlarged. RV systolic function   is normal.   5. Moderate mitral valve regurgitation. Mild mitral stenosis.   6. Bioprosthetic aortic valve. Likely [employer] JOSE. well seated valve.   No leak. Acceptable gradients.   7. Moderate-severe tricuspid regurgitation.   8. There is no evidence of pericardial effusion.    CXR (12/12/19):  FINDINGS: There is no significant change.  The lungs  show diffuse interstitial opacities in perihilar and LEFT lung   base of concerning for infectious pneumonia.  The  heart is enlarged in transverse diameter. No hilar mass. Trachea   midline. Status post TAVR procedure. Cardiac device wire leads are within right   atrium and right ventricle.   Visualized osseous structures are intact.  IMPRESSION:   No interval change..    CT Chest (12/21/19):  Bilateral interstitial lung peripheral lung fibrosis without large airspace consolidation or effusion. No pneumothorax.    Cardiomegaly with calcified mitral annulus, prosthetic aortic valve in place. LEFT atrial enlargement.. Cardiac device wire leads are within right atrium and right ventricle.

## 2019-12-23 NOTE — DISCHARGE NOTE PROVIDER - CARE PROVIDER_API CALL
Jesus Silva)  Cardiovascular Disease  [address] [place] [address]  Phone: [phone]  Fax: [phone]  Follow Up Time:

## 2019-12-23 NOTE — PROGRESS NOTE ADULT - SUBJECTIVE AND OBJECTIVE BOX
seen for HF, afib    improved HR  no acute complaints  gen weakness  ros negative otherwise     MEDICATIONS  (STANDING):  ascorbic acid 500 milliGRAM(s) Oral daily  aspirin  chewable 81 milliGRAM(s) Oral daily  atorvastatin 20 milliGRAM(s) Oral at bedtime  budesonide 160 MICROgram(s)/formoterol 4.5 MICROgram(s) Inhaler 2 Puff(s) Inhalation two times a day  cholecalciferol 1000 Unit(s) Oral daily  digoxin     Tablet 0.125 milliGRAM(s) Oral daily  folic acid 1 milliGRAM(s) Oral daily  furosemide   Injectable 40 milliGRAM(s) IV Push daily  heparin  Injectable 5000 Unit(s) SubCutaneous every 12 hours  influenza   Vaccine 0.5 milliLiter(s) IntraMuscular once  levETIRAcetam 500 milliGRAM(s) Oral two times a day  metoprolol succinate ER 50 milliGRAM(s) Oral daily  metoprolol succinate ER 25 milliGRAM(s) Oral at bedtime  mupirocin 2% Nasal 1 Application(s) Nasal two times a day  potassium chloride    Tablet ER 10 milliEquivalent(s) Oral daily  saccharomyces boulardii 250 milliGRAM(s) Oral two times a day  senna 2 Tablet(s) Oral at bedtime    MEDICATIONS  (PRN):  benzocaine 15 mG/menthol 3.6 mG (Sugar-Free) Lozenge 1 Lozenge Oral every 4 hours PRN Sore Throat  magnesium hydroxide Suspension 30 milliLiter(s) Oral daily PRN Constipation      Allergies    No Known Allergies    Intolerances    CHOCOLATE ENSURE ONLY (Unknown)      Vital Signs Last 24 Hrs  T(C): 36.8 (23 Dec 2019 10:25), Max: 36.8 (23 Dec 2019 10:25)  T(F): 98.2 (23 Dec 2019 10:25), Max: 98.2 (23 Dec 2019 10:25)  HR: 80 (23 Dec 2019 10:25) (74 - 90)  BP: 109/81 (23 Dec 2019 10:25) (96/58 - 109/81)  BP(mean): --  RR: 18 (23 Dec 2019 10:25) (16 - 18)  SpO2: 97% (23 Dec 2019 10:25) (94% - 99%)    PHYSICAL EXAM:    GENERAL: NAD  CHEST/LUNG: bibasilar crackles   HEART: irreg rate and rhythm; S1 S  ABDOMEN: Soft, Bowel sounds present  EXTREMITIES:  no edema   NERVOUS SYSTEM:  Alert & Oriented X3, gen weakness    LABS:                        16.5   9.70  )-----------( 261      ( 23 Dec 2019 06:51 )             51.0     12-23    138  |  92<L>  |  27.0<H>  ----------------------------<  81  4.0   |  28.0  |  0.91    Ca    9.4      23 Dec 2019 06:42  Phos  3.2     12-23  Mg     1.8     12-23            CAPILLARY BLOOD GLUCOSE            RADIOLOGY & ADDITIONAL TESTS:

## 2019-12-23 NOTE — DISCHARGE NOTE PROVIDER - NSDCFUSCHEDAPPT_GEN_ALL_CORE_FT
VERENICE HUDSON T ; 01/10/2020 ; [facility] [address] [facility] ; 01/10/2020 ; [facility] [address] [facility] VERENICE HUDSON T ; 01/10/2020 ; [facility] [address] [facility] ; 01/10/2020 ; [facility] [address] [facility]

## 2019-12-23 NOTE — PROGRESS NOTE ADULT - REASON FOR ADMISSION
AFib RVR, decompensated HF

## 2019-12-23 NOTE — PROGRESS NOTE ADULT - SUBJECTIVE AND OBJECTIVE BOX
VERENICE HUDSON  [id number]        Chief Complaint: follow up UTI/CHF/pulm fibrosis      Subjective: very weak/fatigued      24 hour Tele: AF, rapid rates earlier this morning        ascorbic acid 500 milliGRAM(s) Oral daily  aspirin  chewable 81 milliGRAM(s) Oral daily  atorvastatin 20 milliGRAM(s) Oral at bedtime  benzocaine 15 mG/menthol 3.6 mG (Sugar-Free) Lozenge 1 Lozenge Oral every 4 hours PRN  cholecalciferol 1000 Unit(s) Oral daily  digoxin     Tablet 0.125 milliGRAM(s) Oral daily  folic acid 1 milliGRAM(s) Oral daily  furosemide   Injectable 40 milliGRAM(s) IV Push daily  heparin  Injectable 5000 Unit(s) SubCutaneous every 12 hours  influenza   Vaccine 0.5 milliLiter(s) IntraMuscular once  levETIRAcetam 500 milliGRAM(s) Oral two times a day  magnesium hydroxide Suspension 30 milliLiter(s) Oral daily PRN  metoprolol succinate ER 50 milliGRAM(s) Oral daily  mupirocin 2% Nasal 1 Application(s) Nasal two times a day  potassium chloride    Tablet ER 10 milliEquivalent(s) Oral daily  saccharomyces boulardii 250 milliGRAM(s) Oral two times a day  senna 2 Tablet(s) Oral at bedtime          Physical Exam:  T(C): 36.3 (12-23-19 @ 06:09), Max: 36.5 (12-22-19 @ 19:30)  HR: 90 (12-23-19 @ 06:09) (74 - 90)  BP: 105/68 (12-23-19 @ 06:09) (96/58 - 107/73)  RR: 18 (12-23-19 @ 06:09) (16 - 18)  SpO2: 94% (12-23-19 @ 06:09) (94% - 99%)  Wt(kg): --  General: elderly F Comfortable in NAD  HEENT: dry MM, pale sclera anicteric  Resp: CTA bilaterally, poor air movement  CVS: nl s1s2, irregularly irregular no s3/JVD  Abd: soft, NT, ND, BS+  Ext: No c/c/e  Neuro A&O x3  Psych: Normal affect    I&O's Summary    22 Dec 2019 07:01  -  23 Dec 2019 07:00  --------------------------------------------------------  IN: 100 mL / OUT: 400 mL / NET: -300 mL          Labs:   23 Dec 2019 06:42    138    |  92     |  27.0   ----------------------------<  81     4.0     |  28.0   |  0.91     Ca    9.4        23 Dec 2019 06:42  Phos  3.2       23 Dec 2019 06:42  Mg     1.8       23 Dec 2019 06:42                            16.5   9.70  )-----------( 261      ( 23 Dec 2019 06:51 )             51.0       TTE (12/13/19):  Summary:   1. Severely enlarged left atrium.   2. Left ventricular ejection fraction, by visual estimation, is 70 to 75%.   3. Severely enlarged right atrium.   4. The right ventricular size is mildly enlarged. RV systolic function   is normal.   5. Moderate mitral valve regurgitation. Mild mitral stenosis.   6. Bioprosthetic aortic valve. Likely [employer] JOSE. well seated valve.   No leak. Acceptable gradients.   7. Moderate-severe tricuspid regurgitation.   8. There is no evidence of pericardial effusion.      CT Chest (12/21/19):  Bilateral interstitial lung peripheral lung fibrosis without large airspace consolidation or effusion. No pneumothorax.    Cardiomegaly with calcified mitral annulus, prosthetic aortic valve in place. LEFT atrial enlargement.. Cardiac device wire leads are within right atrium and right ventricle.         Assessment:  84yFemale PMHX with severe AS s/p TAVR, CAD, persistent AF, PPM, COPD here with UTI/CHFpEF and possible pulmonary fibrosis.   -Sepsis from UTI, seems to  be improving  -Remains on IV lasix, would continue one more day and transition to po tomorrow  -Rate control AF, mildly elevated rates in am  -Not on A/C due to high fall risk    Plan:  1. Continue rate control AF, add metoprolol ER 25mg nightly for elevated rates in am  2. MEd managment of CAd  3. No A/C due to fall risk, has not been on as outpatient  4. AMbulate as tolerated  5. Change lasix to po tomorrow      Kupersmith, Jesus J MD

## 2019-12-23 NOTE — DISCHARGE NOTE PROVIDER - NSDCCPCAREPLAN_GEN_ALL_CORE_FT
PRINCIPAL DISCHARGE DIAGNOSIS  Diagnosis: CHF exacerbation  Assessment and Plan of Treatment: acute on chronic diastolic HF  continue medications as prescribed  follow up with cardiology in 1 week.      SECONDARY DISCHARGE DIAGNOSES  Diagnosis: Pulmonary fibrosis  Assessment and Plan of Treatment: chronic, continue inhalers  follow up with your pulmonologist as scheduled.    Diagnosis: Urinary tract infection without hematuria, site unspecified  Assessment and Plan of Treatment: completed antibiotics    Diagnosis: Atrial fibrillation with RVR  Assessment and Plan of Treatment: see above

## 2019-12-23 NOTE — CONSULT NOTE ADULT - SUBJECTIVE AND OBJECTIVE BOX
PULMONARY CONSULT NOTE      [id number]    Patient is a 84y old  Female who presents with a chief complaint of AFib RVR, decompensated HF (23 Dec 2019 08:46)      INTERVAL HPI/OVERNIGHT EVENTS:85yo F with AS s/p TAVR, CAD s/p stents, paroxysmal atrial fibrillation s/p PPM, COPD presented from [place] c/o increased b/l LE swelling with associated fatigue and decreased ambulation along with SOB for last 2 weeks. she was found to be in atrial fib on admission , UA positive started on empirical iv abx for suspected UTI . cardiology consulted , pt is also with CHF on admission likely due to rapid atrial fib iv lasix initiated , rate improved Pt had fever 12/12 am sepsis protocol initiated , blood cx ordered , Urine cx is positive MRSA  ,ID consulted  rocephin stopped vanco started and to complete doxycycline.  Pt is with normal EF , moderate MR and mod to severe TR.  ultimately [facility] discharge    Asked to see pt  for fibrotic changes on CT scan.  Pt has 40 pk yr smoking hx, quit many years ago.  Has h/o asthma on Breo, known fibrotic changes followed by Dr Velazquez in [place].    MEDICATIONS  (STANDING):  ascorbic acid 500 milliGRAM(s) Oral daily  aspirin  chewable 81 milliGRAM(s) Oral daily  atorvastatin 20 milliGRAM(s) Oral at bedtime  cholecalciferol 1000 Unit(s) Oral daily  digoxin     Tablet 0.125 milliGRAM(s) Oral daily  folic acid 1 milliGRAM(s) Oral daily  furosemide   Injectable 40 milliGRAM(s) IV Push daily  heparin  Injectable 5000 Unit(s) SubCutaneous every 12 hours  influenza   Vaccine 0.5 milliLiter(s) IntraMuscular once  levETIRAcetam 500 milliGRAM(s) Oral two times a day  metoprolol succinate ER 50 milliGRAM(s) Oral daily  metoprolol succinate ER 25 milliGRAM(s) Oral at bedtime  mupirocin 2% Nasal 1 Application(s) Nasal two times a day  potassium chloride    Tablet ER 10 milliEquivalent(s) Oral daily  saccharomyces boulardii 250 milliGRAM(s) Oral two times a day  senna 2 Tablet(s) Oral at bedtime      MEDICATIONS  (PRN):  benzocaine 15 mG/menthol 3.6 mG (Sugar-Free) Lozenge 1 Lozenge Oral every 4 hours PRN Sore Throat  magnesium hydroxide Suspension 30 milliLiter(s) Oral daily PRN Constipation      Allergies    No Known Allergies    Intolerances    CHOCOLATE ENSURE ONLY (Unknown)      PAST MEDICAL & SURGICAL HISTORY:  CHF exacerbation  PAF (paroxysmal atrial fibrillation)  Skin cancer  Hyperlipidemia  Aortic stenosis  Bilateral cataracts  S/P TAVR (transcatheter aortic valve replacement)  Pacemaker      FAMILY HISTORY:  No pertinent family history in first degree relatives      SOCIAL HISTORY  Smoking History: former    REVIEW OF SYSTEMS:    CONSTITUTIONAL:  As per HPI.    HEENT:  Eyes:  No diplopia or blurred vision. ENT:  No earache, sore throat or runny nose.    CARDIOVASCULAR:  No pressure, squeezing, tightness, heaviness or aching about the chest; no palpitations.    RESPIRATORY:  Per HPI    GASTROINTESTINAL:  No nausea, vomiting or diarrhea.    GENITOURINARY:  No dysuria, frequency or urgency.    MUSCULOSKELETAL:  No joint pains    SKIN:  No new lesions.    NEUROLOGIC:  No paresthesias, fasciculations, seizures or weakness.    PSYCHIATRIC:  No disorder of thought or mood.    ENDOCRINE:  No heat or cold intolerance, polyuria or polydipsia.    HEMATOLOGICAL:  No easy bruising or bleeding.     Vital Signs Last 24 Hrs  T(C): 36.8 (23 Dec 2019 10:25), Max: 36.8 (23 Dec 2019 10:25)  T(F): 98.2 (23 Dec 2019 10:25), Max: 98.2 (23 Dec 2019 10:25)  HR: 80 (23 Dec 2019 10:25) (74 - 90)  BP: 109/81 (23 Dec 2019 10:25) (96/58 - 109/81)  BP(mean): --  RR: 18 (23 Dec 2019 10:25) (16 - 18)  SpO2: 97% (23 Dec 2019 10:25) (94% - 99%)    PHYSICAL EXAMINATION:    GENERAL: The patient is a well-developed, frail _WF____in no apparent distress.     HEENT: Head is normocephalic and atraumatic. Extraocular muscles are intact. Mucous membranes are moist.     NECK: Supple.     LUNGS: bibasilar crackles.    HEART: Regular rate and rhythm without murmur.    ABDOMEN: Soft, nontender, and nondistended.  No hepatosplenomegaly is noted.    EXTREMITIES: Without any cyanosis, clubbing, rash, lesions or edema.    NEUROLOGIC: Grossly intact.    SKIN: No ulceration or induration present.      LABS:                        16.5   9.70  )-----------( 261      ( 23 Dec 2019 06:51 )             51.0     12-23    138  |  92<L>  |  27.0<H>  ----------------------------<  81  4.0   |  28.0  |  0.91    Ca    9.4      23 Dec 2019 06:42  Phos  3.2     12-23  Mg     1.8     12-23                  Serum Pro-Brain Natriuretic Peptide: 3756 pg/mL (12-23-19 @ 06:51)          MICROBIOLOGY:    RADIOLOGY & ADDITIONAL STUDIES:< from: CT Chest No Cont (12.21.19 @ 12:45) >    Bilateral interstitial lung peripheral lung fibrosis without large airspace consolidation or effusion. No pneumothorax.    Cardiomegaly with calcified mitral annulus, prosthetic aortic valve in place. LEFT atrial enlargement.. Cardiac device wire leads are within right atrium and right ventricle.     < end of copied text >  < from: CT Chest No Cont (12.21.19 @ 12:45) >

## 2019-12-23 NOTE — PROGRESS NOTE ADULT - ASSESSMENT
85yo F with AS s/p TAVR, CAD s/p stents, paroxysmal atrial fibrillation s/p PPM, COPD presented from [place] c/o increased b/l LE swelling with associated fatigue and decreased ambulation along with SOB for last 2 weeks. she was found to be in atrial fib on admission , UA positive started on empirical iv abx for suspected UTI . cardiology consulted , pt is also with CHF on admission likely due to rapid atrial fib iv lasix initiated , rate improved Pt had fever 12/12 am sepsis protocol initiated , blood cx ordered , Urine cx is positive MRSA  ,ID consulted  rocephin stopped vanco started and to complete doxycycline.  Pt is with normal EF , moderate MR and mod to severe TR.  ultimately [facility] discharge    #CHF acute on chronic diastolic CHF     CXR improved but likely has chronic changes    BNP downtrending     IV lasix     cardiology following     #pulmonary fibrosis on CT    pulm eval for any input, no in acute flare up    hx ILD/asthma, started symbicort     #MRSA positive UTI / sepsis resolved    ID eval--completed 7 day course of vancomycin/doxycyline.    #Atrial fib with RVR ---improved      change toprol 50mg to metoprolol 25TID and now back to toprol 50am and 25qhs    s/p dig load and c/w dig daily    bp borderline low so missing doses    not on AC due to fall risk and h/o intracranial bleed     #DVT prophylaxis  - heparin SC     debility: PT following  ultimately [facility]    dispo suspect am to [facility]

## 2019-12-23 NOTE — DISCHARGE NOTE PROVIDER - HOSPITAL COURSE
83yo F with AS s/p TAVR, CAD s/p stents, paroxysmal atrial fibrillation s/p PPM, COPD presented from [place] c/o increased b/l LE swelling with associated fatigue and decreased ambulation along with SOB for last 2 weeks. Echo wiht normal EF , moderate MR and mod to severe TR.  ultimately [facility] discharge        #CHF acute on chronic diastolic CHF--improved      CXR improved but likely has chronic changes      IV lasix       cardiology following         #pulmonary fibrosis on CT      pulm consulted--  hx ILD/asthma, started symbicort         #MRSA positive UTI / sepsis resolved      ID eval--completed 7 day course of vancomycin/doxycyline.        #Atrial fib with RVR ---improved        change toprol 50mg to metoprolol 25TID and now back to toprol 50am and 25qhs      s/p dig load and c/w dig daily      not on AC due to fall risk and h/o intracranial bleed         dc planning 35 minutes 83yo F with AS s/p TAVR, CAD s/p stents, paroxysmal atrial fibrillation s/p PPM, COPD presented from [place] c/o increased b/l LE swelling with associated fatigue and decreased ambulation along with SOB for last 2 weeks. Echo wiht normal EF , moderate MR and mod to severe TR.  ultimately [facility] discharge        #CHF acute on chronic diastolic CHF--improved      CXR improved but likely has chronic changes      IV lasix to oral lasix      cardiology following         #pulmonary fibrosis on CT      pulm consulted--  hx ILD/asthma, started symbicort         #MRSA positive UTI / sepsis resolved      ID eval--completed 7 day course of vancomycin/doxycyline.        #Atrial fib with RVR ---improved        change toprol 50mg to metoprolol 25TID and now back to toprol 50am and 25qhs      s/p dig load and c/w dig daily      not on AC due to fall risk and h/o intracranial bleed         dc planning 35 minutes

## 2019-12-23 NOTE — CONSULT NOTE ADULT - ASSESSMENT
Imp--Pt with known ILD being followed elsewhere.  No flare.  +asthma.    Plan--start symbicort while in hosp.    OK for [facility]  Has f/u appt with Dr. Velazquez in March already.

## 2019-12-23 NOTE — DISCHARGE NOTE PROVIDER - NSDCMRMEDTOKEN_GEN_ALL_CORE_FT
ascorbic acid 500 mg oral tablet: 1 tab(s) orally once a day  aspirin 81 mg oral tablet, chewable: 1 tab(s) orally once a day  atorvastatin 20 mg oral tablet: 1 tab(s) orally once a day  Breo Ellipta 200 mcg-25 mcg/inh inhalation powder: 1 puff(s) inhaled once a day  folic acid 0.8 mg oral tablet: 1 tab(s) orally once a day  Keppra 500 mg oral tablet: 1 tab(s) orally 2 times a day  Lasix 20 mg oral tablet: 1 tab(s) orally once a day  metoprolol succinate 50 mg oral tablet, extended release: 1 tab(s) orally once a day  Vitamin D3 1000 intl units oral tablet: 1 tab(s) orally once a day ascorbic acid 500 mg oral tablet: 1 tab(s) orally once a day  aspirin 81 mg oral tablet, chewable: 1 tab(s) orally once a day  atorvastatin 20 mg oral tablet: 1 tab(s) orally once a day  Breo Ellipta 200 mcg-25 mcg/inh inhalation powder: 1 puff(s) inhaled once a day  digoxin 125 mcg (0.125 mg) oral tablet: 1 tab(s) orally once a day  folic acid 0.8 mg oral tablet: 1 tab(s) orally once a day  furosemide 40 mg oral tablet: 1 tab(s) orally once a day  Keppra 500 mg oral tablet: 1 tab(s) orally 2 times a day  metoprolol succinate 25 mg oral tablet, extended release: 1 tab(s) orally once a day (at bedtime)  metoprolol succinate 50 mg oral tablet, extended release: 1 tab(s) orally once a day  potassium chloride 10 mEq oral tablet, extended release: 1 tab(s) orally once a day  senna oral tablet: 2 tab(s) orally once a day (at bedtime)  Vitamin D3 1000 intl units oral tablet: 1 tab(s) orally once a day

## 2020-01-01 ENCOUNTER — APPOINTMENT (OUTPATIENT)
Dept: CARDIOLOGY | Facility: CLINIC | Age: 85
End: 2020-01-01
Payer: COMMERCIAL

## 2020-01-01 ENCOUNTER — INPATIENT (INPATIENT)
Facility: HOSPITAL | Age: 85
LOS: 4 days | Discharge: HOME | DRG: 871 | End: 2020-03-23
Attending: GENERAL ACUTE CARE HOSPITAL | Admitting: INTERNAL MEDICINE
Payer: MEDICARE

## 2020-01-01 ENCOUNTER — TRANSCRIPTION ENCOUNTER (OUTPATIENT)
Age: 85
End: 2020-01-01

## 2020-01-01 ENCOUNTER — APPOINTMENT (OUTPATIENT)
Dept: CARE COORDINATION | Facility: HOME HEALTH | Age: 85
End: 2020-01-01

## 2020-01-01 ENCOUNTER — NON-APPOINTMENT (OUTPATIENT)
Age: 85
End: 2020-01-01

## 2020-01-01 ENCOUNTER — INBOUND DOCUMENT (OUTPATIENT)
Age: 85
End: 2020-01-01

## 2020-01-01 VITALS
BODY MASS INDEX: 24.66 KG/M2 | SYSTOLIC BLOOD PRESSURE: 114 MMHG | DIASTOLIC BLOOD PRESSURE: 76 MMHG | HEIGHT: 62 IN | WEIGHT: 134 LBS | RESPIRATION RATE: 15 BRPM | HEART RATE: 75 BPM

## 2020-01-01 VITALS
RESPIRATION RATE: 16 BRPM | HEART RATE: 70 BPM | SYSTOLIC BLOOD PRESSURE: 138 MMHG | DIASTOLIC BLOOD PRESSURE: 79 MMHG | TEMPERATURE: 98 F | OXYGEN SATURATION: 97 %

## 2020-01-01 VITALS
RESPIRATION RATE: 23 BRPM | DIASTOLIC BLOOD PRESSURE: 42 MMHG | HEART RATE: 69 BPM | OXYGEN SATURATION: 97 % | SYSTOLIC BLOOD PRESSURE: 77 MMHG

## 2020-01-01 DIAGNOSIS — I25.10 ATHEROSCLEROTIC HEART DISEASE OF NATIVE CORONARY ARTERY WITHOUT ANGINA PECTORIS: ICD-10-CM

## 2020-01-01 DIAGNOSIS — Z95.2 PRESENCE OF PROSTHETIC HEART VALVE: ICD-10-CM

## 2020-01-01 DIAGNOSIS — I35.0 NONRHEUMATIC AORTIC (VALVE) STENOSIS: ICD-10-CM

## 2020-01-01 DIAGNOSIS — I50.32 CHRONIC DIASTOLIC (CONGESTIVE) HEART FAILURE: ICD-10-CM

## 2020-01-01 DIAGNOSIS — J44.9 CHRONIC OBSTRUCTIVE PULMONARY DISEASE, UNSPECIFIED: ICD-10-CM

## 2020-01-01 DIAGNOSIS — I48.91 UNSPECIFIED ATRIAL FIBRILLATION: ICD-10-CM

## 2020-01-01 DIAGNOSIS — I95.9 HYPOTENSION, UNSPECIFIED: ICD-10-CM

## 2020-01-01 DIAGNOSIS — Z95.0 PRESENCE OF CARDIAC PACEMAKER: Chronic | ICD-10-CM

## 2020-01-01 DIAGNOSIS — E87.6 HYPOKALEMIA: ICD-10-CM

## 2020-01-01 DIAGNOSIS — Z95.2 PRESENCE OF PROSTHETIC HEART VALVE: Chronic | ICD-10-CM

## 2020-01-01 DIAGNOSIS — G40.909 EPILEPSY, UNSPECIFIED, NOT INTRACTABLE, WITHOUT STATUS EPILEPTICUS: ICD-10-CM

## 2020-01-01 DIAGNOSIS — H26.9 UNSPECIFIED CATARACT: Chronic | ICD-10-CM

## 2020-01-01 DIAGNOSIS — N17.9 ACUTE KIDNEY FAILURE, UNSPECIFIED: ICD-10-CM

## 2020-01-01 DIAGNOSIS — E03.9 HYPOTHYROIDISM, UNSPECIFIED: ICD-10-CM

## 2020-01-01 DIAGNOSIS — R60.0 LOCALIZED EDEMA: ICD-10-CM

## 2020-01-01 DIAGNOSIS — A41.9 SEPSIS, UNSPECIFIED ORGANISM: ICD-10-CM

## 2020-01-01 LAB
ALBUMIN SERPL ELPH-MCNC: 1.8 G/DL — LOW (ref 3.3–5.2)
ALBUMIN SERPL ELPH-MCNC: 2 G/DL — LOW (ref 3.3–5.2)
ALBUMIN SERPL ELPH-MCNC: 2.1 G/DL — LOW (ref 3.3–5.2)
ALP SERPL-CCNC: 100 U/L — SIGNIFICANT CHANGE UP (ref 40–120)
ALP SERPL-CCNC: 103 U/L — SIGNIFICANT CHANGE UP (ref 40–120)
ALP SERPL-CCNC: 106 U/L — SIGNIFICANT CHANGE UP (ref 40–120)
ALT FLD-CCNC: 11 U/L — SIGNIFICANT CHANGE UP
ALT FLD-CCNC: 12 U/L — SIGNIFICANT CHANGE UP
ALT FLD-CCNC: 13 U/L — SIGNIFICANT CHANGE UP
ANION GAP SERPL CALC-SCNC: 11 MMOL/L — SIGNIFICANT CHANGE UP (ref 5–17)
ANION GAP SERPL CALC-SCNC: 11 MMOL/L — SIGNIFICANT CHANGE UP (ref 5–17)
ANION GAP SERPL CALC-SCNC: 12 MMOL/L — SIGNIFICANT CHANGE UP (ref 5–17)
ANION GAP SERPL CALC-SCNC: 12 MMOL/L — SIGNIFICANT CHANGE UP (ref 5–17)
ANION GAP SERPL CALC-SCNC: 13 MMOL/L — SIGNIFICANT CHANGE UP (ref 5–17)
ANION GAP SERPL CALC-SCNC: 15 MMOL/L — SIGNIFICANT CHANGE UP (ref 5–17)
ANION GAP SERPL CALC-SCNC: 9 MMOL/L — SIGNIFICANT CHANGE UP (ref 5–17)
ANISOCYTOSIS BLD QL: SIGNIFICANT CHANGE UP
ANISOCYTOSIS BLD QL: SLIGHT — SIGNIFICANT CHANGE UP
APPEARANCE UR: CLEAR — SIGNIFICANT CHANGE UP
APTT BLD: 31.5 SEC — SIGNIFICANT CHANGE UP (ref 27.5–36.3)
AST SERPL-CCNC: 25 U/L — SIGNIFICANT CHANGE UP
AST SERPL-CCNC: 26 U/L — SIGNIFICANT CHANGE UP
AST SERPL-CCNC: 31 U/L — SIGNIFICANT CHANGE UP
BACTERIA # UR AUTO: ABNORMAL
BASOPHILS # BLD AUTO: 0 K/UL — SIGNIFICANT CHANGE UP (ref 0–0.2)
BASOPHILS # BLD AUTO: 0 K/UL — SIGNIFICANT CHANGE UP (ref 0–0.2)
BASOPHILS # BLD AUTO: 0.04 K/UL — SIGNIFICANT CHANGE UP (ref 0–0.2)
BASOPHILS # BLD AUTO: 0.04 K/UL — SIGNIFICANT CHANGE UP (ref 0–0.2)
BASOPHILS # BLD AUTO: 0.05 K/UL — SIGNIFICANT CHANGE UP (ref 0–0.2)
BASOPHILS # BLD AUTO: 0.08 K/UL — SIGNIFICANT CHANGE UP (ref 0–0.2)
BASOPHILS NFR BLD AUTO: 0 % — SIGNIFICANT CHANGE UP (ref 0–2)
BASOPHILS NFR BLD AUTO: 0 % — SIGNIFICANT CHANGE UP (ref 0–2)
BASOPHILS NFR BLD AUTO: 0.2 % — SIGNIFICANT CHANGE UP (ref 0–2)
BASOPHILS NFR BLD AUTO: 0.2 % — SIGNIFICANT CHANGE UP (ref 0–2)
BASOPHILS NFR BLD AUTO: 0.3 % — SIGNIFICANT CHANGE UP (ref 0–2)
BASOPHILS NFR BLD AUTO: 0.3 % — SIGNIFICANT CHANGE UP (ref 0–2)
BILIRUB SERPL-MCNC: 0.5 MG/DL — SIGNIFICANT CHANGE UP (ref 0.4–2)
BILIRUB SERPL-MCNC: 0.5 MG/DL — SIGNIFICANT CHANGE UP (ref 0.4–2)
BILIRUB SERPL-MCNC: 0.6 MG/DL — SIGNIFICANT CHANGE UP (ref 0.4–2)
BILIRUB UR-MCNC: NEGATIVE — SIGNIFICANT CHANGE UP
BUN SERPL-MCNC: 10 MG/DL — SIGNIFICANT CHANGE UP (ref 8–20)
BUN SERPL-MCNC: 11 MG/DL — SIGNIFICANT CHANGE UP (ref 8–20)
BUN SERPL-MCNC: 15 MG/DL — SIGNIFICANT CHANGE UP (ref 8–20)
BUN SERPL-MCNC: 17 MG/DL — SIGNIFICANT CHANGE UP (ref 8–20)
BUN SERPL-MCNC: 19 MG/DL — SIGNIFICANT CHANGE UP (ref 8–20)
BUN SERPL-MCNC: 8 MG/DL — SIGNIFICANT CHANGE UP (ref 8–20)
BUN SERPL-MCNC: 9 MG/DL — SIGNIFICANT CHANGE UP (ref 8–20)
CALCIUM SERPL-MCNC: 6.8 MG/DL — LOW (ref 8.6–10.2)
CALCIUM SERPL-MCNC: 7.1 MG/DL — LOW (ref 8.6–10.2)
CALCIUM SERPL-MCNC: 7.4 MG/DL — LOW (ref 8.6–10.2)
CALCIUM SERPL-MCNC: 7.4 MG/DL — LOW (ref 8.6–10.2)
CALCIUM SERPL-MCNC: 7.8 MG/DL — LOW (ref 8.6–10.2)
CALCIUM SERPL-MCNC: 7.8 MG/DL — LOW (ref 8.6–10.2)
CALCIUM SERPL-MCNC: 8 MG/DL — LOW (ref 8.6–10.2)
CHLORIDE SERPL-SCNC: 100 MMOL/L — SIGNIFICANT CHANGE UP (ref 98–107)
CHLORIDE SERPL-SCNC: 94 MMOL/L — LOW (ref 98–107)
CHLORIDE SERPL-SCNC: 96 MMOL/L — LOW (ref 98–107)
CHLORIDE SERPL-SCNC: 96 MMOL/L — LOW (ref 98–107)
CHLORIDE SERPL-SCNC: 97 MMOL/L — LOW (ref 98–107)
CHLORIDE SERPL-SCNC: 98 MMOL/L — SIGNIFICANT CHANGE UP (ref 98–107)
CHLORIDE SERPL-SCNC: 99 MMOL/L — SIGNIFICANT CHANGE UP (ref 98–107)
CO2 SERPL-SCNC: 23 MMOL/L — SIGNIFICANT CHANGE UP (ref 22–29)
CO2 SERPL-SCNC: 24 MMOL/L — SIGNIFICANT CHANGE UP (ref 22–29)
CO2 SERPL-SCNC: 25 MMOL/L — SIGNIFICANT CHANGE UP (ref 22–29)
CO2 SERPL-SCNC: 27 MMOL/L — SIGNIFICANT CHANGE UP (ref 22–29)
CO2 SERPL-SCNC: 27 MMOL/L — SIGNIFICANT CHANGE UP (ref 22–29)
CO2 SERPL-SCNC: 28 MMOL/L — SIGNIFICANT CHANGE UP (ref 22–29)
CO2 SERPL-SCNC: 29 MMOL/L — SIGNIFICANT CHANGE UP (ref 22–29)
COLOR SPEC: YELLOW — SIGNIFICANT CHANGE UP
CREAT SERPL-MCNC: 0.57 MG/DL — SIGNIFICANT CHANGE UP (ref 0.5–1.3)
CREAT SERPL-MCNC: 0.57 MG/DL — SIGNIFICANT CHANGE UP (ref 0.5–1.3)
CREAT SERPL-MCNC: 0.63 MG/DL — SIGNIFICANT CHANGE UP (ref 0.5–1.3)
CREAT SERPL-MCNC: 0.73 MG/DL — SIGNIFICANT CHANGE UP (ref 0.5–1.3)
CREAT SERPL-MCNC: 0.75 MG/DL — SIGNIFICANT CHANGE UP (ref 0.5–1.3)
CREAT SERPL-MCNC: 1.07 MG/DL — SIGNIFICANT CHANGE UP (ref 0.5–1.3)
CREAT SERPL-MCNC: 1.33 MG/DL — HIGH (ref 0.5–1.3)
CULTURE RESULTS: SIGNIFICANT CHANGE UP
DACRYOCYTES BLD QL SMEAR: SLIGHT — SIGNIFICANT CHANGE UP
DIFF PNL FLD: ABNORMAL
DIGOXIN SERPL-MCNC: 1.7 NG/ML — SIGNIFICANT CHANGE UP (ref 0.8–2)
ELLIPTOCYTES BLD QL SMEAR: SLIGHT — SIGNIFICANT CHANGE UP
EOSINOPHIL # BLD AUTO: 0 K/UL — SIGNIFICANT CHANGE UP (ref 0–0.5)
EOSINOPHIL # BLD AUTO: 0.01 K/UL — SIGNIFICANT CHANGE UP (ref 0–0.5)
EOSINOPHIL # BLD AUTO: 0.04 K/UL — SIGNIFICANT CHANGE UP (ref 0–0.5)
EOSINOPHIL # BLD AUTO: 0.08 K/UL — SIGNIFICANT CHANGE UP (ref 0–0.5)
EOSINOPHIL NFR BLD AUTO: 0 % — SIGNIFICANT CHANGE UP (ref 0–6)
EOSINOPHIL NFR BLD AUTO: 0.2 % — SIGNIFICANT CHANGE UP (ref 0–6)
EOSINOPHIL NFR BLD AUTO: 0.3 % — SIGNIFICANT CHANGE UP (ref 0–6)
EPI CELLS # UR: SIGNIFICANT CHANGE UP
FERRITIN SERPL-MCNC: 360 NG/ML — HIGH (ref 15–150)
GIANT PLATELETS BLD QL SMEAR: PRESENT — SIGNIFICANT CHANGE UP
GIANT PLATELETS BLD QL SMEAR: PRESENT — SIGNIFICANT CHANGE UP
GLUCOSE SERPL-MCNC: 107 MG/DL — HIGH (ref 70–99)
GLUCOSE SERPL-MCNC: 119 MG/DL — HIGH (ref 70–99)
GLUCOSE SERPL-MCNC: 121 MG/DL — HIGH (ref 70–99)
GLUCOSE SERPL-MCNC: 133 MG/DL — HIGH (ref 70–99)
GLUCOSE SERPL-MCNC: 79 MG/DL — SIGNIFICANT CHANGE UP (ref 70–99)
GLUCOSE SERPL-MCNC: 89 MG/DL — SIGNIFICANT CHANGE UP (ref 70–99)
GLUCOSE SERPL-MCNC: 95 MG/DL — SIGNIFICANT CHANGE UP (ref 70–99)
GLUCOSE UR QL: NEGATIVE MG/DL — SIGNIFICANT CHANGE UP
HCT VFR BLD CALC: 28.2 % — LOW (ref 34.5–45)
HCT VFR BLD CALC: 28.9 % — LOW (ref 34.5–45)
HCT VFR BLD CALC: 29.6 % — LOW (ref 34.5–45)
HCT VFR BLD CALC: 30.5 % — LOW (ref 34.5–45)
HCT VFR BLD CALC: 30.7 % — LOW (ref 34.5–45)
HCT VFR BLD CALC: 33 % — LOW (ref 34.5–45)
HCT VFR BLD CALC: 35.5 % — SIGNIFICANT CHANGE UP (ref 34.5–45)
HGB BLD-MCNC: 10.1 G/DL — LOW (ref 11.5–15.5)
HGB BLD-MCNC: 10.5 G/DL — LOW (ref 11.5–15.5)
HGB BLD-MCNC: 11.4 G/DL — LOW (ref 11.5–15.5)
HGB BLD-MCNC: 9 G/DL — LOW (ref 11.5–15.5)
HGB BLD-MCNC: 9.2 G/DL — LOW (ref 11.5–15.5)
HGB BLD-MCNC: 9.4 G/DL — LOW (ref 11.5–15.5)
HGB BLD-MCNC: 9.7 G/DL — LOW (ref 11.5–15.5)
HYPOCHROMIA BLD QL: SLIGHT — SIGNIFICANT CHANGE UP
IMM GRANULOCYTES NFR BLD AUTO: 0.6 % — SIGNIFICANT CHANGE UP (ref 0–1.5)
IMM GRANULOCYTES NFR BLD AUTO: 1 % — SIGNIFICANT CHANGE UP (ref 0–1.5)
IMM GRANULOCYTES NFR BLD AUTO: 1 % — SIGNIFICANT CHANGE UP (ref 0–1.5)
IMM GRANULOCYTES NFR BLD AUTO: 4.4 % — HIGH (ref 0–1.5)
INR BLD: 1.32 RATIO — HIGH (ref 0.88–1.16)
IRON SATN MFR SERPL: 57 % — HIGH (ref 14–50)
IRON SATN MFR SERPL: 76 UG/DL — SIGNIFICANT CHANGE UP (ref 37–145)
KETONES UR-MCNC: ABNORMAL
LACTATE BLDV-MCNC: 1.9 MMOL/L — SIGNIFICANT CHANGE UP (ref 0.5–2)
LACTATE SERPL-SCNC: 1.4 MMOL/L — SIGNIFICANT CHANGE UP (ref 0.5–2)
LEUKOCYTE ESTERASE UR-ACNC: ABNORMAL
LYMPHOCYTES # BLD AUTO: 0.51 K/UL — LOW (ref 1–3.3)
LYMPHOCYTES # BLD AUTO: 0.63 K/UL — LOW (ref 1–3.3)
LYMPHOCYTES # BLD AUTO: 0.68 K/UL — LOW (ref 1–3.3)
LYMPHOCYTES # BLD AUTO: 1.03 K/UL — SIGNIFICANT CHANGE UP (ref 1–3.3)
LYMPHOCYTES # BLD AUTO: 1.09 K/UL — SIGNIFICANT CHANGE UP (ref 1–3.3)
LYMPHOCYTES # BLD AUTO: 1.5 K/UL — SIGNIFICANT CHANGE UP (ref 1–3.3)
LYMPHOCYTES # BLD AUTO: 2.3 % — LOW (ref 13–44)
LYMPHOCYTES # BLD AUTO: 2.6 % — LOW (ref 13–44)
LYMPHOCYTES # BLD AUTO: 4.3 % — LOW (ref 13–44)
LYMPHOCYTES # BLD AUTO: 4.4 % — LOW (ref 13–44)
LYMPHOCYTES # BLD AUTO: 4.7 % — LOW (ref 13–44)
LYMPHOCYTES # BLD AUTO: 9.3 % — LOW (ref 13–44)
MACROCYTES BLD QL: SIGNIFICANT CHANGE UP
MACROCYTES BLD QL: SLIGHT — SIGNIFICANT CHANGE UP
MAGNESIUM SERPL-MCNC: 1.8 MG/DL — SIGNIFICANT CHANGE UP (ref 1.8–2.6)
MAGNESIUM SERPL-MCNC: 2.1 MG/DL — SIGNIFICANT CHANGE UP (ref 1.6–2.6)
MAGNESIUM SERPL-MCNC: 2.3 MG/DL — SIGNIFICANT CHANGE UP (ref 1.6–2.6)
MAGNESIUM SERPL-MCNC: 2.3 MG/DL — SIGNIFICANT CHANGE UP (ref 1.6–2.6)
MANUAL SMEAR VERIFICATION: SIGNIFICANT CHANGE UP
MANUAL SMEAR VERIFICATION: SIGNIFICANT CHANGE UP
MCHC RBC-ENTMCNC: 30.2 PG — SIGNIFICANT CHANGE UP (ref 27–34)
MCHC RBC-ENTMCNC: 30.5 PG — SIGNIFICANT CHANGE UP (ref 27–34)
MCHC RBC-ENTMCNC: 30.6 PG — SIGNIFICANT CHANGE UP (ref 27–34)
MCHC RBC-ENTMCNC: 30.7 PG — SIGNIFICANT CHANGE UP (ref 27–34)
MCHC RBC-ENTMCNC: 31.4 PG — SIGNIFICANT CHANGE UP (ref 27–34)
MCHC RBC-ENTMCNC: 31.8 GM/DL — LOW (ref 32–36)
MCHC RBC-ENTMCNC: 31.9 GM/DL — LOW (ref 32–36)
MCHC RBC-ENTMCNC: 32.1 GM/DL — SIGNIFICANT CHANGE UP (ref 32–36)
MCHC RBC-ENTMCNC: 32.9 GM/DL — SIGNIFICANT CHANGE UP (ref 32–36)
MCV RBC AUTO: 95.2 FL — SIGNIFICANT CHANGE UP (ref 80–100)
MCV RBC AUTO: 95.3 FL — SIGNIFICANT CHANGE UP (ref 80–100)
MCV RBC AUTO: 95.6 FL — SIGNIFICANT CHANGE UP (ref 80–100)
MCV RBC AUTO: 95.7 FL — SIGNIFICANT CHANGE UP (ref 80–100)
MCV RBC AUTO: 95.9 FL — SIGNIFICANT CHANGE UP (ref 80–100)
MCV RBC AUTO: 95.9 FL — SIGNIFICANT CHANGE UP (ref 80–100)
MCV RBC AUTO: 96 FL — SIGNIFICANT CHANGE UP (ref 80–100)
MICROCYTES BLD QL: SLIGHT — SIGNIFICANT CHANGE UP
MONOCYTES # BLD AUTO: 0.13 K/UL — SIGNIFICANT CHANGE UP (ref 0–0.9)
MONOCYTES # BLD AUTO: 0.47 K/UL — SIGNIFICANT CHANGE UP (ref 0–0.9)
MONOCYTES # BLD AUTO: 0.68 K/UL — SIGNIFICANT CHANGE UP (ref 0–0.9)
MONOCYTES # BLD AUTO: 0.73 K/UL — SIGNIFICANT CHANGE UP (ref 0–0.9)
MONOCYTES # BLD AUTO: 0.87 K/UL — SIGNIFICANT CHANGE UP (ref 0–0.9)
MONOCYTES # BLD AUTO: 1.02 K/UL — HIGH (ref 0–0.9)
MONOCYTES NFR BLD AUTO: 0.9 % — LOW (ref 2–14)
MONOCYTES NFR BLD AUTO: 2.1 % — SIGNIFICANT CHANGE UP (ref 2–14)
MONOCYTES NFR BLD AUTO: 2.6 % — SIGNIFICANT CHANGE UP (ref 2–14)
MONOCYTES NFR BLD AUTO: 3.3 % — SIGNIFICANT CHANGE UP (ref 2–14)
MONOCYTES NFR BLD AUTO: 4 % — SIGNIFICANT CHANGE UP (ref 2–14)
MONOCYTES NFR BLD AUTO: 5.4 % — SIGNIFICANT CHANGE UP (ref 2–14)
MRSA PCR RESULT.: DETECTED
NEUTROPHILS # BLD AUTO: 12.99 K/UL — HIGH (ref 1.8–7.4)
NEUTROPHILS # BLD AUTO: 13.67 K/UL — HIGH (ref 1.8–7.4)
NEUTROPHILS # BLD AUTO: 20.09 K/UL — HIGH (ref 1.8–7.4)
NEUTROPHILS # BLD AUTO: 20.86 K/UL — HIGH (ref 1.8–7.4)
NEUTROPHILS # BLD AUTO: 23.07 K/UL — HIGH (ref 1.8–7.4)
NEUTROPHILS # BLD AUTO: 24.86 K/UL — HIGH (ref 1.8–7.4)
NEUTROPHILS NFR BLD AUTO: 80.6 % — HIGH (ref 43–77)
NEUTROPHILS NFR BLD AUTO: 90.5 % — HIGH (ref 43–77)
NEUTROPHILS NFR BLD AUTO: 90.8 % — HIGH (ref 43–77)
NEUTROPHILS NFR BLD AUTO: 93.9 % — HIGH (ref 43–77)
NEUTROPHILS NFR BLD AUTO: 94.2 % — HIGH (ref 43–77)
NEUTROPHILS NFR BLD AUTO: 94.7 % — HIGH (ref 43–77)
NEUTS BAND # BLD: 0.9 % — SIGNIFICANT CHANGE UP (ref 0–8)
NITRITE UR-MCNC: NEGATIVE — SIGNIFICANT CHANGE UP
NRBC # BLD: 1 /100 — HIGH (ref 0–0)
OVALOCYTES BLD QL SMEAR: SLIGHT — SIGNIFICANT CHANGE UP
OVALOCYTES BLD QL SMEAR: SLIGHT — SIGNIFICANT CHANGE UP
PH UR: 6 — SIGNIFICANT CHANGE UP (ref 5–8)
PHOSPHATE SERPL-MCNC: 2 MG/DL — LOW (ref 2.4–4.7)
PHOSPHATE SERPL-MCNC: 2.2 MG/DL — LOW (ref 2.4–4.7)
PHOSPHATE SERPL-MCNC: 2.3 MG/DL — LOW (ref 2.4–4.7)
PHOSPHATE SERPL-MCNC: 2.9 MG/DL — SIGNIFICANT CHANGE UP (ref 2.4–4.7)
PHOSPHATE SERPL-MCNC: 3.6 MG/DL — SIGNIFICANT CHANGE UP (ref 2.4–4.7)
PLAT MORPH BLD: NORMAL — SIGNIFICANT CHANGE UP
PLAT MORPH BLD: NORMAL — SIGNIFICANT CHANGE UP
PLATELET # BLD AUTO: 243 K/UL — SIGNIFICANT CHANGE UP (ref 150–400)
PLATELET # BLD AUTO: 251 K/UL — SIGNIFICANT CHANGE UP (ref 150–400)
PLATELET # BLD AUTO: 261 K/UL — SIGNIFICANT CHANGE UP (ref 150–400)
PLATELET # BLD AUTO: 265 K/UL — SIGNIFICANT CHANGE UP (ref 150–400)
PLATELET # BLD AUTO: 284 K/UL — SIGNIFICANT CHANGE UP (ref 150–400)
PLATELET # BLD AUTO: 309 K/UL — SIGNIFICANT CHANGE UP (ref 150–400)
PLATELET # BLD AUTO: 321 K/UL — SIGNIFICANT CHANGE UP (ref 150–400)
POIKILOCYTOSIS BLD QL AUTO: SLIGHT — SIGNIFICANT CHANGE UP
POLYCHROMASIA BLD QL SMEAR: SLIGHT — SIGNIFICANT CHANGE UP
POLYCHROMASIA BLD QL SMEAR: SLIGHT — SIGNIFICANT CHANGE UP
POTASSIUM SERPL-MCNC: 3 MMOL/L — LOW (ref 3.5–5.3)
POTASSIUM SERPL-MCNC: 3.3 MMOL/L — LOW (ref 3.5–5.3)
POTASSIUM SERPL-MCNC: 3.4 MMOL/L — LOW (ref 3.5–5.3)
POTASSIUM SERPL-MCNC: 3.6 MMOL/L — SIGNIFICANT CHANGE UP (ref 3.5–5.3)
POTASSIUM SERPL-MCNC: 3.7 MMOL/L — SIGNIFICANT CHANGE UP (ref 3.5–5.3)
POTASSIUM SERPL-MCNC: 3.8 MMOL/L — SIGNIFICANT CHANGE UP (ref 3.5–5.3)
POTASSIUM SERPL-MCNC: 4.2 MMOL/L — SIGNIFICANT CHANGE UP (ref 3.5–5.3)
POTASSIUM SERPL-SCNC: 3 MMOL/L — LOW (ref 3.5–5.3)
POTASSIUM SERPL-SCNC: 3.3 MMOL/L — LOW (ref 3.5–5.3)
POTASSIUM SERPL-SCNC: 3.4 MMOL/L — LOW (ref 3.5–5.3)
POTASSIUM SERPL-SCNC: 3.6 MMOL/L — SIGNIFICANT CHANGE UP (ref 3.5–5.3)
POTASSIUM SERPL-SCNC: 3.7 MMOL/L — SIGNIFICANT CHANGE UP (ref 3.5–5.3)
POTASSIUM SERPL-SCNC: 3.8 MMOL/L — SIGNIFICANT CHANGE UP (ref 3.5–5.3)
POTASSIUM SERPL-SCNC: 4.2 MMOL/L — SIGNIFICANT CHANGE UP (ref 3.5–5.3)
PROCALCITONIN SERPL-MCNC: 0.15 NG/ML — HIGH (ref 0.02–0.1)
PROCALCITONIN SERPL-MCNC: 0.16 NG/ML — HIGH (ref 0.02–0.1)
PROCALCITONIN SERPL-MCNC: 0.21 NG/ML — HIGH (ref 0.02–0.1)
PROCALCITONIN SERPL-MCNC: 0.36 NG/ML — HIGH (ref 0.02–0.1)
PROT SERPL-MCNC: 5.1 G/DL — LOW (ref 6.6–8.7)
PROT SERPL-MCNC: 5.4 G/DL — LOW (ref 6.6–8.7)
PROT SERPL-MCNC: 6.1 G/DL — LOW (ref 6.6–8.7)
PROT UR-MCNC: 30 MG/DL
PROTHROM AB SERPL-ACNC: 15.1 SEC — HIGH (ref 10–12.9)
RBC # BLD: 2.95 M/UL — LOW (ref 3.8–5.2)
RBC # BLD: 3.01 M/UL — LOW (ref 3.8–5.2)
RBC # BLD: 3.11 M/UL — LOW (ref 3.8–5.2)
RBC # BLD: 3.18 M/UL — LOW (ref 3.8–5.2)
RBC # BLD: 3.22 M/UL — LOW (ref 3.8–5.2)
RBC # BLD: 3.44 M/UL — LOW (ref 3.8–5.2)
RBC # BLD: 3.71 M/UL — LOW (ref 3.8–5.2)
RBC # FLD: 16.3 % — HIGH (ref 10.3–14.5)
RBC # FLD: 16.4 % — HIGH (ref 10.3–14.5)
RBC # FLD: 16.5 % — HIGH (ref 10.3–14.5)
RBC # FLD: 16.6 % — HIGH (ref 10.3–14.5)
RBC # FLD: 16.7 % — HIGH (ref 10.3–14.5)
RBC BLD AUTO: ABNORMAL
RBC BLD AUTO: ABNORMAL
RBC CASTS # UR COMP ASSIST: ABNORMAL /HPF (ref 0–4)
S AUREUS DNA NOSE QL NAA+PROBE: DETECTED
SCHISTOCYTES BLD QL AUTO: SLIGHT — SIGNIFICANT CHANGE UP
SMUDGE CELLS # BLD: PRESENT — SIGNIFICANT CHANGE UP
SODIUM SERPL-SCNC: 130 MMOL/L — LOW (ref 135–145)
SODIUM SERPL-SCNC: 132 MMOL/L — LOW (ref 135–145)
SODIUM SERPL-SCNC: 133 MMOL/L — LOW (ref 135–145)
SODIUM SERPL-SCNC: 136 MMOL/L — SIGNIFICANT CHANGE UP (ref 135–145)
SODIUM SERPL-SCNC: 138 MMOL/L — SIGNIFICANT CHANGE UP (ref 135–145)
SODIUM SERPL-SCNC: 138 MMOL/L — SIGNIFICANT CHANGE UP (ref 135–145)
SODIUM SERPL-SCNC: 139 MMOL/L — SIGNIFICANT CHANGE UP (ref 135–145)
SP GR SPEC: 1.01 — SIGNIFICANT CHANGE UP (ref 1.01–1.02)
SPECIMEN SOURCE: SIGNIFICANT CHANGE UP
TIBC SERPL-MCNC: 134 UG/DL — LOW (ref 220–430)
TRANSFERRIN SERPL-MCNC: 94 MG/DL — LOW (ref 192–382)
TSH SERPL-MCNC: 1.15 UIU/ML — SIGNIFICANT CHANGE UP (ref 0.27–4.2)
TSH SERPL-MCNC: 5.97 UIU/ML — HIGH (ref 0.27–4.2)
UROBILINOGEN FLD QL: NEGATIVE MG/DL — SIGNIFICANT CHANGE UP
VANCOMYCIN TROUGH SERPL-MCNC: 15.3 UG/ML — SIGNIFICANT CHANGE UP (ref 10–20)
WBC # BLD: 14.43 K/UL — HIGH (ref 3.8–10.5)
WBC # BLD: 16.12 K/UL — HIGH (ref 3.8–10.5)
WBC # BLD: 22.13 K/UL — HIGH (ref 3.8–10.5)
WBC # BLD: 22.14 K/UL — HIGH (ref 3.8–10.5)
WBC # BLD: 25.5 K/UL — HIGH (ref 3.8–10.5)
WBC # BLD: 26.22 K/UL — HIGH (ref 3.8–10.5)
WBC # BLD: 26.96 K/UL — HIGH (ref 3.8–10.5)
WBC # FLD AUTO: 14.43 K/UL — HIGH (ref 3.8–10.5)
WBC # FLD AUTO: 16.12 K/UL — HIGH (ref 3.8–10.5)
WBC # FLD AUTO: 22.13 K/UL — HIGH (ref 3.8–10.5)
WBC # FLD AUTO: 22.14 K/UL — HIGH (ref 3.8–10.5)
WBC # FLD AUTO: 25.5 K/UL — HIGH (ref 3.8–10.5)
WBC # FLD AUTO: 26.22 K/UL — HIGH (ref 3.8–10.5)
WBC # FLD AUTO: 26.96 K/UL — HIGH (ref 3.8–10.5)
WBC UR QL: ABNORMAL

## 2020-01-01 PROCEDURE — 74176 CT ABD & PELVIS W/O CONTRAST: CPT | Mod: 26

## 2020-01-01 PROCEDURE — 80048 BASIC METABOLIC PNL TOTAL CA: CPT

## 2020-01-01 PROCEDURE — 99291 CRITICAL CARE FIRST HOUR: CPT

## 2020-01-01 PROCEDURE — 93000 ELECTROCARDIOGRAM COMPLETE: CPT | Mod: 59

## 2020-01-01 PROCEDURE — 85730 THROMBOPLASTIN TIME PARTIAL: CPT

## 2020-01-01 PROCEDURE — 99291 CRITICAL CARE FIRST HOUR: CPT | Mod: 25

## 2020-01-01 PROCEDURE — 80053 COMPREHEN METABOLIC PANEL: CPT

## 2020-01-01 PROCEDURE — 80202 ASSAY OF VANCOMYCIN: CPT

## 2020-01-01 PROCEDURE — 80162 ASSAY OF DIGOXIN TOTAL: CPT

## 2020-01-01 PROCEDURE — C1751: CPT

## 2020-01-01 PROCEDURE — 71045 X-RAY EXAM CHEST 1 VIEW: CPT

## 2020-01-01 PROCEDURE — 87507 IADNA-DNA/RNA PROBE TQ 12-25: CPT

## 2020-01-01 PROCEDURE — 93010 ELECTROCARDIOGRAM REPORT: CPT

## 2020-01-01 PROCEDURE — 99233 SBSQ HOSP IP/OBS HIGH 50: CPT

## 2020-01-01 PROCEDURE — 36557 INSERT TUNNELED CV CATH: CPT

## 2020-01-01 PROCEDURE — 84145 PROCALCITONIN (PCT): CPT

## 2020-01-01 PROCEDURE — 83735 ASSAY OF MAGNESIUM: CPT

## 2020-01-01 PROCEDURE — 83540 ASSAY OF IRON: CPT

## 2020-01-01 PROCEDURE — 84100 ASSAY OF PHOSPHORUS: CPT

## 2020-01-01 PROCEDURE — 84466 ASSAY OF TRANSFERRIN: CPT

## 2020-01-01 PROCEDURE — 87640 STAPH A DNA AMP PROBE: CPT

## 2020-01-01 PROCEDURE — 99215 OFFICE O/P EST HI 40 MIN: CPT

## 2020-01-01 PROCEDURE — 84443 ASSAY THYROID STIM HORMONE: CPT

## 2020-01-01 PROCEDURE — 82728 ASSAY OF FERRITIN: CPT

## 2020-01-01 PROCEDURE — 93005 ELECTROCARDIOGRAM TRACING: CPT

## 2020-01-01 PROCEDURE — 71045 X-RAY EXAM CHEST 1 VIEW: CPT | Mod: 26

## 2020-01-01 PROCEDURE — 83605 ASSAY OF LACTIC ACID: CPT

## 2020-01-01 PROCEDURE — 83550 IRON BINDING TEST: CPT

## 2020-01-01 PROCEDURE — 36415 COLL VENOUS BLD VENIPUNCTURE: CPT

## 2020-01-01 PROCEDURE — 99223 1ST HOSP IP/OBS HIGH 75: CPT

## 2020-01-01 PROCEDURE — 96375 TX/PRO/DX INJ NEW DRUG ADDON: CPT | Mod: XU

## 2020-01-01 PROCEDURE — 96361 HYDRATE IV INFUSION ADD-ON: CPT | Mod: XU

## 2020-01-01 PROCEDURE — 36556 INSERT NON-TUNNEL CV CATH: CPT

## 2020-01-01 PROCEDURE — 81001 URINALYSIS AUTO W/SCOPE: CPT

## 2020-01-01 PROCEDURE — 85610 PROTHROMBIN TIME: CPT

## 2020-01-01 PROCEDURE — 87040 BLOOD CULTURE FOR BACTERIA: CPT

## 2020-01-01 PROCEDURE — 87493 C DIFF AMPLIFIED PROBE: CPT

## 2020-01-01 PROCEDURE — 87086 URINE CULTURE/COLONY COUNT: CPT

## 2020-01-01 PROCEDURE — 99232 SBSQ HOSP IP/OBS MODERATE 35: CPT

## 2020-01-01 PROCEDURE — 93288 INTERROG EVL PM/LDLS PM IP: CPT | Mod: TC

## 2020-01-01 PROCEDURE — 99239 HOSP IP/OBS DSCHRG MGMT >30: CPT

## 2020-01-01 PROCEDURE — 87641 MR-STAPH DNA AMP PROBE: CPT

## 2020-01-01 PROCEDURE — 85027 COMPLETE CBC AUTOMATED: CPT

## 2020-01-01 PROCEDURE — 74176 CT ABD & PELVIS W/O CONTRAST: CPT

## 2020-01-01 PROCEDURE — 93288 INTERROG EVL PM/LDLS PM IP: CPT

## 2020-01-01 PROCEDURE — 96365 THER/PROPH/DIAG IV INF INIT: CPT | Mod: XU

## 2020-01-01 RX ORDER — MIDODRINE HYDROCHLORIDE 2.5 MG/1
1 TABLET ORAL
Qty: 0 | Refills: 0 | DISCHARGE

## 2020-01-01 RX ORDER — PIPERACILLIN AND TAZOBACTAM 4; .5 G/20ML; G/20ML
3.38 INJECTION, POWDER, LYOPHILIZED, FOR SOLUTION INTRAVENOUS ONCE
Refills: 0 | Status: COMPLETED | OUTPATIENT
Start: 2020-01-01 | End: 2020-01-01

## 2020-01-01 RX ORDER — SODIUM CHLORIDE 9 MG/ML
500 INJECTION, SOLUTION INTRAVENOUS ONCE
Refills: 0 | Status: COMPLETED | OUTPATIENT
Start: 2020-01-01 | End: 2020-01-01

## 2020-01-01 RX ORDER — POTASSIUM PHOSPHATE, MONOBASIC POTASSIUM PHOSPHATE, DIBASIC 236; 224 MG/ML; MG/ML
15 INJECTION, SOLUTION INTRAVENOUS ONCE
Refills: 0 | Status: COMPLETED | OUTPATIENT
Start: 2020-01-01 | End: 2020-01-01

## 2020-01-01 RX ORDER — AMIODARONE HYDROCHLORIDE 400 MG/1
200 TABLET ORAL DAILY
Refills: 0 | Status: DISCONTINUED | OUTPATIENT
Start: 2020-01-01 | End: 2020-01-01

## 2020-01-01 RX ORDER — LEVOTHYROXINE SODIUM 125 MCG
1 TABLET ORAL
Qty: 0 | Refills: 0 | DISCHARGE
Start: 2020-01-01

## 2020-01-01 RX ORDER — CLOPIDOGREL BISULFATE 75 MG/1
1 TABLET, FILM COATED ORAL
Qty: 0 | Refills: 0 | DISCHARGE

## 2020-01-01 RX ORDER — HYDROCORTISONE 20 MG
25 TABLET ORAL EVERY 12 HOURS
Refills: 0 | Status: DISCONTINUED | OUTPATIENT
Start: 2020-01-01 | End: 2020-01-01

## 2020-01-01 RX ORDER — FLUTICASONE FUROATE AND VILANTEROL TRIFENATATE 100; 25 UG/1; UG/1
1 POWDER RESPIRATORY (INHALATION)
Qty: 0 | Refills: 0 | DISCHARGE

## 2020-01-01 RX ORDER — AMIODARONE HYDROCHLORIDE 400 MG/1
1 TABLET ORAL
Qty: 0 | Refills: 0 | DISCHARGE

## 2020-01-01 RX ORDER — LEVOTHYROXINE SODIUM 125 MCG
50 TABLET ORAL DAILY
Refills: 0 | Status: DISCONTINUED | OUTPATIENT
Start: 2020-01-01 | End: 2020-01-01

## 2020-01-01 RX ORDER — HEPARIN SODIUM 5000 [USP'U]/ML
5000 INJECTION INTRAVENOUS; SUBCUTANEOUS EVERY 8 HOURS
Refills: 0 | Status: DISCONTINUED | OUTPATIENT
Start: 2020-01-01 | End: 2020-01-01

## 2020-01-01 RX ORDER — VANCOMYCIN HCL 1 G
10 VIAL (EA) INTRAVENOUS
Qty: 0 | Refills: 0 | DISCHARGE
Start: 2020-01-01

## 2020-01-01 RX ORDER — NOREPINEPHRINE BITARTRATE/D5W 8 MG/250ML
0.05 PLASTIC BAG, INJECTION (ML) INTRAVENOUS
Qty: 8 | Refills: 0 | Status: DISCONTINUED | OUTPATIENT
Start: 2020-01-01 | End: 2020-01-01

## 2020-01-01 RX ORDER — SODIUM CHLORIDE 9 MG/ML
1000 INJECTION, SOLUTION INTRAVENOUS
Refills: 0 | Status: DISCONTINUED | OUTPATIENT
Start: 2020-01-01 | End: 2020-01-01

## 2020-01-01 RX ORDER — LEVOTHYROXINE SODIUM 125 MCG
1 TABLET ORAL
Qty: 0 | Refills: 0 | DISCHARGE

## 2020-01-01 RX ORDER — LEVETIRACETAM 250 MG/1
500 TABLET, FILM COATED ORAL
Refills: 0 | Status: DISCONTINUED | OUTPATIENT
Start: 2020-01-01 | End: 2020-01-01

## 2020-01-01 RX ORDER — SODIUM CHLORIDE 9 MG/ML
1800 INJECTION INTRAMUSCULAR; INTRAVENOUS; SUBCUTANEOUS ONCE
Refills: 0 | Status: COMPLETED | OUTPATIENT
Start: 2020-01-01 | End: 2020-01-01

## 2020-01-01 RX ORDER — VANCOMYCIN HCL 1 G
1000 VIAL (EA) INTRAVENOUS ONCE
Refills: 0 | Status: COMPLETED | OUTPATIENT
Start: 2020-01-01 | End: 2020-01-01

## 2020-01-01 RX ORDER — PIPERACILLIN AND TAZOBACTAM 4; .5 G/20ML; G/20ML
3.38 INJECTION, POWDER, LYOPHILIZED, FOR SOLUTION INTRAVENOUS EVERY 8 HOURS
Refills: 0 | Status: DISCONTINUED | OUTPATIENT
Start: 2020-01-01 | End: 2020-01-01

## 2020-01-01 RX ORDER — POTASSIUM CHLORIDE 20 MEQ
10 PACKET (EA) ORAL
Refills: 0 | Status: COMPLETED | OUTPATIENT
Start: 2020-01-01 | End: 2020-01-01

## 2020-01-01 RX ORDER — SODIUM CHLORIDE 9 MG/ML
1000 INJECTION, SOLUTION INTRAVENOUS ONCE
Refills: 0 | Status: COMPLETED | OUTPATIENT
Start: 2020-01-01 | End: 2020-01-01

## 2020-01-01 RX ORDER — CEFTRIAXONE 500 MG/1
1 INJECTION, POWDER, FOR SOLUTION INTRAMUSCULAR; INTRAVENOUS
Qty: 0 | Refills: 0 | DISCHARGE
Start: 2020-01-01 | End: 2020-01-01

## 2020-01-01 RX ORDER — POTASSIUM CHLORIDE 20 MEQ
40 PACKET (EA) ORAL ONCE
Refills: 0 | Status: COMPLETED | OUTPATIENT
Start: 2020-01-01 | End: 2020-01-01

## 2020-01-01 RX ORDER — HYDROCORTISONE 20 MG
50 TABLET ORAL EVERY 8 HOURS
Refills: 0 | Status: DISCONTINUED | OUTPATIENT
Start: 2020-01-01 | End: 2020-01-01

## 2020-01-01 RX ORDER — VANCOMYCIN HCL 1 G
125 VIAL (EA) INTRAVENOUS EVERY 6 HOURS
Refills: 0 | Status: DISCONTINUED | OUTPATIENT
Start: 2020-01-01 | End: 2020-01-01

## 2020-01-01 RX ORDER — ATORVASTATIN CALCIUM 80 MG/1
20 TABLET, FILM COATED ORAL AT BEDTIME
Refills: 0 | Status: DISCONTINUED | OUTPATIENT
Start: 2020-01-01 | End: 2020-01-01

## 2020-01-01 RX ORDER — ASPIRIN/CALCIUM CARB/MAGNESIUM 324 MG
81 TABLET ORAL DAILY
Refills: 0 | Status: DISCONTINUED | OUTPATIENT
Start: 2020-01-01 | End: 2020-01-01

## 2020-01-01 RX ORDER — SODIUM CHLORIDE 0.65 %
2 AEROSOL, SPRAY (ML) NASAL
Qty: 0 | Refills: 0 | DISCHARGE

## 2020-01-01 RX ORDER — MIDODRINE HYDROCHLORIDE 2.5 MG/1
20 TABLET ORAL EVERY 8 HOURS
Refills: 0 | Status: DISCONTINUED | OUTPATIENT
Start: 2020-01-01 | End: 2020-01-01

## 2020-01-01 RX ORDER — VASOPRESSIN 20 [USP'U]/ML
0.04 INJECTION INTRAVENOUS
Qty: 50 | Refills: 0 | Status: DISCONTINUED | OUTPATIENT
Start: 2020-01-01 | End: 2020-01-01

## 2020-01-01 RX ORDER — CEFTRIAXONE 500 MG/1
1000 INJECTION, POWDER, FOR SOLUTION INTRAMUSCULAR; INTRAVENOUS EVERY 24 HOURS
Refills: 0 | Status: DISCONTINUED | OUTPATIENT
Start: 2020-01-01 | End: 2020-01-01

## 2020-01-01 RX ORDER — MAGNESIUM OXIDE 400 MG ORAL TABLET 241.3 MG
1 TABLET ORAL
Qty: 0 | Refills: 0 | DISCHARGE

## 2020-01-01 RX ORDER — POTASSIUM PHOSPHATE, MONOBASIC POTASSIUM PHOSPHATE, DIBASIC 236; 224 MG/ML; MG/ML
30 INJECTION, SOLUTION INTRAVENOUS ONCE
Refills: 0 | Status: COMPLETED | OUTPATIENT
Start: 2020-01-01 | End: 2020-01-01

## 2020-01-01 RX ORDER — ATORVASTATIN CALCIUM 80 MG/1
1 TABLET, FILM COATED ORAL
Qty: 0 | Refills: 0 | DISCHARGE

## 2020-01-01 RX ORDER — VANCOMYCIN HCL 1 G
750 VIAL (EA) INTRAVENOUS
Refills: 0 | Status: DISCONTINUED | OUTPATIENT
Start: 2020-01-01 | End: 2020-01-01

## 2020-01-01 RX ORDER — HYDROCORTISONE 20 MG
100 TABLET ORAL EVERY 8 HOURS
Refills: 0 | Status: DISCONTINUED | OUTPATIENT
Start: 2020-01-01 | End: 2020-01-01

## 2020-01-01 RX ORDER — DIGOXIN 250 MCG
0.12 TABLET ORAL DAILY
Refills: 0 | Status: DISCONTINUED | OUTPATIENT
Start: 2020-01-01 | End: 2020-01-01

## 2020-01-01 RX ORDER — AMIODARONE HYDROCHLORIDE 400 MG/1
1 TABLET ORAL
Qty: 0 | Refills: 0 | DISCHARGE
Start: 2020-01-01

## 2020-01-01 RX ORDER — MIDODRINE HYDROCHLORIDE 2.5 MG/1
2 TABLET ORAL
Qty: 0 | Refills: 0 | DISCHARGE
Start: 2020-01-01

## 2020-01-01 RX ORDER — INFLUENZA VIRUS VACCINE 15; 15; 15; 15 UG/.5ML; UG/.5ML; UG/.5ML; UG/.5ML
0.5 SUSPENSION INTRAMUSCULAR ONCE
Refills: 0 | Status: DISCONTINUED | OUTPATIENT
Start: 2020-01-01 | End: 2020-01-01

## 2020-01-01 RX ORDER — ALBUTEROL 90 UG/1
3 AEROSOL, METERED ORAL
Qty: 0 | Refills: 0 | DISCHARGE

## 2020-01-01 RX ORDER — MIRTAZAPINE 45 MG/1
1 TABLET, ORALLY DISINTEGRATING ORAL
Qty: 0 | Refills: 0 | DISCHARGE

## 2020-01-01 RX ORDER — MIDODRINE HYDROCHLORIDE 2.5 MG/1
15 TABLET ORAL EVERY 8 HOURS
Refills: 0 | Status: DISCONTINUED | OUTPATIENT
Start: 2020-01-01 | End: 2020-01-01

## 2020-01-01 RX ORDER — CHLORHEXIDINE GLUCONATE 213 G/1000ML
1 SOLUTION TOPICAL
Refills: 0 | Status: DISCONTINUED | OUTPATIENT
Start: 2020-01-01 | End: 2020-01-01

## 2020-01-01 RX ORDER — CLOPIDOGREL BISULFATE 75 MG/1
75 TABLET, FILM COATED ORAL DAILY
Refills: 0 | Status: DISCONTINUED | OUTPATIENT
Start: 2020-01-01 | End: 2020-01-01

## 2020-01-01 RX ORDER — MIDODRINE HYDROCHLORIDE 2.5 MG/1
10 TABLET ORAL EVERY 8 HOURS
Refills: 0 | Status: DISCONTINUED | OUTPATIENT
Start: 2020-01-01 | End: 2020-01-01

## 2020-01-01 RX ORDER — MAGNESIUM SULFATE 500 MG/ML
1 VIAL (ML) INJECTION ONCE
Refills: 0 | Status: COMPLETED | OUTPATIENT
Start: 2020-01-01 | End: 2020-01-01

## 2020-01-01 RX ORDER — LEVETIRACETAM 250 MG/1
1 TABLET, FILM COATED ORAL
Qty: 0 | Refills: 0 | DISCHARGE

## 2020-01-01 RX ADMIN — CLOPIDOGREL BISULFATE 75 MILLIGRAM(S): 75 TABLET, FILM COATED ORAL at 22:01

## 2020-01-01 RX ADMIN — SODIUM CHLORIDE 75 MILLILITER(S): 9 INJECTION, SOLUTION INTRAVENOUS at 20:53

## 2020-01-01 RX ADMIN — HEPARIN SODIUM 5000 UNIT(S): 5000 INJECTION INTRAVENOUS; SUBCUTANEOUS at 13:30

## 2020-01-01 RX ADMIN — MIDODRINE HYDROCHLORIDE 15 MILLIGRAM(S): 2.5 TABLET ORAL at 21:31

## 2020-01-01 RX ADMIN — ATORVASTATIN CALCIUM 20 MILLIGRAM(S): 80 TABLET, FILM COATED ORAL at 22:20

## 2020-01-01 RX ADMIN — SODIUM CHLORIDE 75 MILLILITER(S): 9 INJECTION, SOLUTION INTRAVENOUS at 00:17

## 2020-01-01 RX ADMIN — Medication 100 MILLIGRAM(S): at 05:50

## 2020-01-01 RX ADMIN — Medication 5.44 MICROGRAM(S)/KG/MIN: at 18:14

## 2020-01-01 RX ADMIN — HEPARIN SODIUM 5000 UNIT(S): 5000 INJECTION INTRAVENOUS; SUBCUTANEOUS at 14:11

## 2020-01-01 RX ADMIN — Medication 250 MILLIGRAM(S): at 15:01

## 2020-01-01 RX ADMIN — LEVETIRACETAM 500 MILLIGRAM(S): 250 TABLET, FILM COATED ORAL at 05:51

## 2020-01-01 RX ADMIN — HEPARIN SODIUM 5000 UNIT(S): 5000 INJECTION INTRAVENOUS; SUBCUTANEOUS at 22:20

## 2020-01-01 RX ADMIN — Medication 50 MILLIGRAM(S): at 22:54

## 2020-01-01 RX ADMIN — AMIODARONE HYDROCHLORIDE 200 MILLIGRAM(S): 400 TABLET ORAL at 05:49

## 2020-01-01 RX ADMIN — MIDODRINE HYDROCHLORIDE 20 MILLIGRAM(S): 2.5 TABLET ORAL at 05:48

## 2020-01-01 RX ADMIN — CLOPIDOGREL BISULFATE 75 MILLIGRAM(S): 75 TABLET, FILM COATED ORAL at 12:37

## 2020-01-01 RX ADMIN — SODIUM CHLORIDE 250 MILLILITER(S): 9 INJECTION, SOLUTION INTRAVENOUS at 16:30

## 2020-01-01 RX ADMIN — Medication 81 MILLIGRAM(S): at 12:37

## 2020-01-01 RX ADMIN — HEPARIN SODIUM 5000 UNIT(S): 5000 INJECTION INTRAVENOUS; SUBCUTANEOUS at 05:49

## 2020-01-01 RX ADMIN — Medication 125 MILLIGRAM(S): at 11:05

## 2020-01-01 RX ADMIN — AMIODARONE HYDROCHLORIDE 200 MILLIGRAM(S): 400 TABLET ORAL at 22:02

## 2020-01-01 RX ADMIN — CLOPIDOGREL BISULFATE 75 MILLIGRAM(S): 75 TABLET, FILM COATED ORAL at 13:30

## 2020-01-01 RX ADMIN — MIDODRINE HYDROCHLORIDE 15 MILLIGRAM(S): 2.5 TABLET ORAL at 05:50

## 2020-01-01 RX ADMIN — Medication 0.12 MILLIGRAM(S): at 05:48

## 2020-01-01 RX ADMIN — ATORVASTATIN CALCIUM 20 MILLIGRAM(S): 80 TABLET, FILM COATED ORAL at 22:53

## 2020-01-01 RX ADMIN — Medication 0.12 MILLIGRAM(S): at 05:33

## 2020-01-01 RX ADMIN — CLOPIDOGREL BISULFATE 75 MILLIGRAM(S): 75 TABLET, FILM COATED ORAL at 11:29

## 2020-01-01 RX ADMIN — AMIODARONE HYDROCHLORIDE 200 MILLIGRAM(S): 400 TABLET ORAL at 06:35

## 2020-01-01 RX ADMIN — Medication 125 MILLIGRAM(S): at 09:03

## 2020-01-01 RX ADMIN — CLOPIDOGREL BISULFATE 75 MILLIGRAM(S): 75 TABLET, FILM COATED ORAL at 12:02

## 2020-01-01 RX ADMIN — Medication 100 MILLIEQUIVALENT(S): at 21:00

## 2020-01-01 RX ADMIN — Medication 50 MICROGRAM(S): at 05:50

## 2020-01-01 RX ADMIN — LEVETIRACETAM 500 MILLIGRAM(S): 250 TABLET, FILM COATED ORAL at 06:37

## 2020-01-01 RX ADMIN — Medication 5.44 MICROGRAM(S)/KG/MIN: at 08:33

## 2020-01-01 RX ADMIN — MIDODRINE HYDROCHLORIDE 15 MILLIGRAM(S): 2.5 TABLET ORAL at 13:58

## 2020-01-01 RX ADMIN — Medication 81 MILLIGRAM(S): at 12:34

## 2020-01-01 RX ADMIN — ATORVASTATIN CALCIUM 20 MILLIGRAM(S): 80 TABLET, FILM COATED ORAL at 21:31

## 2020-01-01 RX ADMIN — Medication 100 MILLIGRAM(S): at 21:30

## 2020-01-01 RX ADMIN — LEVETIRACETAM 500 MILLIGRAM(S): 250 TABLET, FILM COATED ORAL at 17:33

## 2020-01-01 RX ADMIN — Medication 50 MILLIGRAM(S): at 15:59

## 2020-01-01 RX ADMIN — MIDODRINE HYDROCHLORIDE 20 MILLIGRAM(S): 2.5 TABLET ORAL at 14:20

## 2020-01-01 RX ADMIN — HEPARIN SODIUM 5000 UNIT(S): 5000 INJECTION INTRAVENOUS; SUBCUTANEOUS at 21:30

## 2020-01-01 RX ADMIN — PIPERACILLIN AND TAZOBACTAM 25 GRAM(S): 4; .5 INJECTION, POWDER, LYOPHILIZED, FOR SOLUTION INTRAVENOUS at 14:40

## 2020-01-01 RX ADMIN — HEPARIN SODIUM 5000 UNIT(S): 5000 INJECTION INTRAVENOUS; SUBCUTANEOUS at 13:57

## 2020-01-01 RX ADMIN — CHLORHEXIDINE GLUCONATE 1 APPLICATION(S): 213 SOLUTION TOPICAL at 22:02

## 2020-01-01 RX ADMIN — Medication 81 MILLIGRAM(S): at 11:29

## 2020-01-01 RX ADMIN — Medication 250 MILLIGRAM(S): at 20:53

## 2020-01-01 RX ADMIN — Medication 100 MILLIEQUIVALENT(S): at 23:39

## 2020-01-01 RX ADMIN — Medication 100 MILLIGRAM(S): at 13:57

## 2020-01-01 RX ADMIN — PIPERACILLIN AND TAZOBACTAM 25 GRAM(S): 4; .5 INJECTION, POWDER, LYOPHILIZED, FOR SOLUTION INTRAVENOUS at 05:49

## 2020-01-01 RX ADMIN — PIPERACILLIN AND TAZOBACTAM 200 GRAM(S): 4; .5 INJECTION, POWDER, LYOPHILIZED, FOR SOLUTION INTRAVENOUS at 17:58

## 2020-01-01 RX ADMIN — PIPERACILLIN AND TAZOBACTAM 25 GRAM(S): 4; .5 INJECTION, POWDER, LYOPHILIZED, FOR SOLUTION INTRAVENOUS at 22:00

## 2020-01-01 RX ADMIN — PIPERACILLIN AND TAZOBACTAM 25 GRAM(S): 4; .5 INJECTION, POWDER, LYOPHILIZED, FOR SOLUTION INTRAVENOUS at 05:41

## 2020-01-01 RX ADMIN — CHLORHEXIDINE GLUCONATE 1 APPLICATION(S): 213 SOLUTION TOPICAL at 05:50

## 2020-01-01 RX ADMIN — LEVETIRACETAM 500 MILLIGRAM(S): 250 TABLET, FILM COATED ORAL at 05:33

## 2020-01-01 RX ADMIN — POTASSIUM PHOSPHATE, MONOBASIC POTASSIUM PHOSPHATE, DIBASIC 83.33 MILLIMOLE(S): 236; 224 INJECTION, SOLUTION INTRAVENOUS at 14:39

## 2020-01-01 RX ADMIN — Medication 100 MILLIGRAM(S): at 14:02

## 2020-01-01 RX ADMIN — CEFTRIAXONE 100 MILLIGRAM(S): 500 INJECTION, POWDER, FOR SOLUTION INTRAMUSCULAR; INTRAVENOUS at 15:56

## 2020-01-01 RX ADMIN — PIPERACILLIN AND TAZOBACTAM 25 GRAM(S): 4; .5 INJECTION, POWDER, LYOPHILIZED, FOR SOLUTION INTRAVENOUS at 14:11

## 2020-01-01 RX ADMIN — Medication 81 MILLIGRAM(S): at 13:30

## 2020-01-01 RX ADMIN — AMIODARONE HYDROCHLORIDE 200 MILLIGRAM(S): 400 TABLET ORAL at 05:50

## 2020-01-01 RX ADMIN — MIDODRINE HYDROCHLORIDE 20 MILLIGRAM(S): 2.5 TABLET ORAL at 22:53

## 2020-01-01 RX ADMIN — CHLORHEXIDINE GLUCONATE 1 APPLICATION(S): 213 SOLUTION TOPICAL at 05:41

## 2020-01-01 RX ADMIN — MIDODRINE HYDROCHLORIDE 15 MILLIGRAM(S): 2.5 TABLET ORAL at 13:30

## 2020-01-01 RX ADMIN — Medication 50 MICROGRAM(S): at 06:38

## 2020-01-01 RX ADMIN — AMIODARONE HYDROCHLORIDE 200 MILLIGRAM(S): 400 TABLET ORAL at 05:33

## 2020-01-01 RX ADMIN — Medication 100 GRAM(S): at 08:33

## 2020-01-01 RX ADMIN — Medication 250 MILLIGRAM(S): at 13:58

## 2020-01-01 RX ADMIN — Medication 100 MILLIGRAM(S): at 23:20

## 2020-01-01 RX ADMIN — MIDODRINE HYDROCHLORIDE 20 MILLIGRAM(S): 2.5 TABLET ORAL at 14:02

## 2020-01-01 RX ADMIN — Medication 100 MILLIEQUIVALENT(S): at 09:13

## 2020-01-01 RX ADMIN — Medication 250 MILLIGRAM(S): at 13:32

## 2020-01-01 RX ADMIN — Medication 125 MILLIGRAM(S): at 03:18

## 2020-01-01 RX ADMIN — Medication 125 MILLIGRAM(S): at 03:17

## 2020-01-01 RX ADMIN — ATORVASTATIN CALCIUM 20 MILLIGRAM(S): 80 TABLET, FILM COATED ORAL at 00:22

## 2020-01-01 RX ADMIN — Medication 125 MILLIGRAM(S): at 14:27

## 2020-01-01 RX ADMIN — HEPARIN SODIUM 5000 UNIT(S): 5000 INJECTION INTRAVENOUS; SUBCUTANEOUS at 22:53

## 2020-01-01 RX ADMIN — PIPERACILLIN AND TAZOBACTAM 25 GRAM(S): 4; .5 INJECTION, POWDER, LYOPHILIZED, FOR SOLUTION INTRAVENOUS at 22:45

## 2020-01-01 RX ADMIN — Medication 100 MILLIGRAM(S): at 05:33

## 2020-01-01 RX ADMIN — PIPERACILLIN AND TAZOBACTAM 25 GRAM(S): 4; .5 INJECTION, POWDER, LYOPHILIZED, FOR SOLUTION INTRAVENOUS at 13:58

## 2020-01-01 RX ADMIN — Medication 0.12 MILLIGRAM(S): at 05:50

## 2020-01-01 RX ADMIN — Medication 100 MILLIEQUIVALENT(S): at 08:33

## 2020-01-01 RX ADMIN — LEVETIRACETAM 500 MILLIGRAM(S): 250 TABLET, FILM COATED ORAL at 18:25

## 2020-01-01 RX ADMIN — PIPERACILLIN AND TAZOBACTAM 25 GRAM(S): 4; .5 INJECTION, POWDER, LYOPHILIZED, FOR SOLUTION INTRAVENOUS at 05:42

## 2020-01-01 RX ADMIN — HEPARIN SODIUM 5000 UNIT(S): 5000 INJECTION INTRAVENOUS; SUBCUTANEOUS at 05:33

## 2020-01-01 RX ADMIN — SODIUM CHLORIDE 500 MILLILITER(S): 9 INJECTION, SOLUTION INTRAVENOUS at 13:35

## 2020-01-01 RX ADMIN — CHLORHEXIDINE GLUCONATE 1 APPLICATION(S): 213 SOLUTION TOPICAL at 05:35

## 2020-01-01 RX ADMIN — Medication 250 MILLIGRAM(S): at 19:39

## 2020-01-01 RX ADMIN — MIDODRINE HYDROCHLORIDE 20 MILLIGRAM(S): 2.5 TABLET ORAL at 06:35

## 2020-01-01 RX ADMIN — VASOPRESSIN 2.4 UNIT(S)/MIN: 20 INJECTION INTRAVENOUS at 12:34

## 2020-01-01 RX ADMIN — Medication 0.12 MILLIGRAM(S): at 05:41

## 2020-01-01 RX ADMIN — CEFTRIAXONE 100 MILLIGRAM(S): 500 INJECTION, POWDER, FOR SOLUTION INTRAMUSCULAR; INTRAVENOUS at 16:46

## 2020-01-01 RX ADMIN — Medication 81 MILLIGRAM(S): at 12:01

## 2020-01-01 RX ADMIN — LEVETIRACETAM 500 MILLIGRAM(S): 250 TABLET, FILM COATED ORAL at 17:37

## 2020-01-01 RX ADMIN — PIPERACILLIN AND TAZOBACTAM 3.38 GRAM(S): 4; .5 INJECTION, POWDER, LYOPHILIZED, FOR SOLUTION INTRAVENOUS at 18:28

## 2020-01-01 RX ADMIN — Medication 50 MILLIGRAM(S): at 05:50

## 2020-01-01 RX ADMIN — HEPARIN SODIUM 5000 UNIT(S): 5000 INJECTION INTRAVENOUS; SUBCUTANEOUS at 05:40

## 2020-01-01 RX ADMIN — CLOPIDOGREL BISULFATE 75 MILLIGRAM(S): 75 TABLET, FILM COATED ORAL at 12:34

## 2020-01-01 RX ADMIN — SODIUM CHLORIDE 500 MILLILITER(S): 9 INJECTION, SOLUTION INTRAVENOUS at 15:00

## 2020-01-01 RX ADMIN — Medication 100 MILLIEQUIVALENT(S): at 10:00

## 2020-01-01 RX ADMIN — CHLORHEXIDINE GLUCONATE 1 APPLICATION(S): 213 SOLUTION TOPICAL at 06:38

## 2020-01-01 RX ADMIN — Medication 50 MICROGRAM(S): at 05:41

## 2020-01-01 RX ADMIN — Medication 100 MILLIGRAM(S): at 13:29

## 2020-01-01 RX ADMIN — ATORVASTATIN CALCIUM 20 MILLIGRAM(S): 80 TABLET, FILM COATED ORAL at 23:21

## 2020-01-01 RX ADMIN — HEPARIN SODIUM 5000 UNIT(S): 5000 INJECTION INTRAVENOUS; SUBCUTANEOUS at 14:20

## 2020-01-01 RX ADMIN — Medication 125 MILLIGRAM(S): at 21:30

## 2020-01-01 RX ADMIN — SODIUM CHLORIDE 1800 MILLILITER(S): 9 INJECTION INTRAMUSCULAR; INTRAVENOUS; SUBCUTANEOUS at 20:40

## 2020-01-01 RX ADMIN — LEVETIRACETAM 500 MILLIGRAM(S): 250 TABLET, FILM COATED ORAL at 18:47

## 2020-01-01 RX ADMIN — HEPARIN SODIUM 5000 UNIT(S): 5000 INJECTION INTRAVENOUS; SUBCUTANEOUS at 06:37

## 2020-01-01 RX ADMIN — Medication 100 MILLIEQUIVALENT(S): at 22:00

## 2020-01-01 RX ADMIN — MIDODRINE HYDROCHLORIDE 15 MILLIGRAM(S): 2.5 TABLET ORAL at 23:18

## 2020-01-01 RX ADMIN — MIDODRINE HYDROCHLORIDE 10 MILLIGRAM(S): 2.5 TABLET ORAL at 22:01

## 2020-01-01 RX ADMIN — CHLORHEXIDINE GLUCONATE 1 APPLICATION(S): 213 SOLUTION TOPICAL at 05:49

## 2020-01-01 RX ADMIN — Medication 81 MILLIGRAM(S): at 22:02

## 2020-01-01 RX ADMIN — POTASSIUM PHOSPHATE, MONOBASIC POTASSIUM PHOSPHATE, DIBASIC 62.5 MILLIMOLE(S): 236; 224 INJECTION, SOLUTION INTRAVENOUS at 09:02

## 2020-01-01 RX ADMIN — PIPERACILLIN AND TAZOBACTAM 25 GRAM(S): 4; .5 INJECTION, POWDER, LYOPHILIZED, FOR SOLUTION INTRAVENOUS at 13:31

## 2020-01-01 RX ADMIN — Medication 0.12 MILLIGRAM(S): at 06:35

## 2020-01-01 RX ADMIN — HEPARIN SODIUM 5000 UNIT(S): 5000 INJECTION INTRAVENOUS; SUBCUTANEOUS at 22:01

## 2020-01-01 RX ADMIN — Medication 40 MILLIEQUIVALENT(S): at 08:59

## 2020-01-01 RX ADMIN — AMIODARONE HYDROCHLORIDE 200 MILLIGRAM(S): 400 TABLET ORAL at 05:41

## 2020-01-01 RX ADMIN — PIPERACILLIN AND TAZOBACTAM 25 GRAM(S): 4; .5 INJECTION, POWDER, LYOPHILIZED, FOR SOLUTION INTRAVENOUS at 06:37

## 2020-01-01 RX ADMIN — PIPERACILLIN AND TAZOBACTAM 25 GRAM(S): 4; .5 INJECTION, POWDER, LYOPHILIZED, FOR SOLUTION INTRAVENOUS at 21:30

## 2020-01-01 RX ADMIN — Medication 0.12 MILLIGRAM(S): at 22:02

## 2020-01-01 RX ADMIN — HEPARIN SODIUM 5000 UNIT(S): 5000 INJECTION INTRAVENOUS; SUBCUTANEOUS at 15:01

## 2020-01-01 RX ADMIN — Medication 50 MICROGRAM(S): at 05:33

## 2020-01-01 RX ADMIN — Medication 100 MILLIGRAM(S): at 06:35

## 2020-01-01 RX ADMIN — HEPARIN SODIUM 5000 UNIT(S): 5000 INJECTION INTRAVENOUS; SUBCUTANEOUS at 23:20

## 2020-01-01 RX ADMIN — MIDODRINE HYDROCHLORIDE 10 MILLIGRAM(S): 2.5 TABLET ORAL at 05:41

## 2020-01-01 RX ADMIN — SODIUM CHLORIDE 1800 MILLILITER(S): 9 INJECTION INTRAMUSCULAR; INTRAVENOUS; SUBCUTANEOUS at 17:56

## 2020-01-01 RX ADMIN — LEVETIRACETAM 500 MILLIGRAM(S): 250 TABLET, FILM COATED ORAL at 05:41

## 2020-01-01 RX ADMIN — Medication 5.44 MICROGRAM(S)/KG/MIN: at 23:39

## 2020-01-01 RX ADMIN — Medication 100 MILLIGRAM(S): at 22:20

## 2020-01-01 RX ADMIN — PIPERACILLIN AND TAZOBACTAM 25 GRAM(S): 4; .5 INJECTION, POWDER, LYOPHILIZED, FOR SOLUTION INTRAVENOUS at 23:20

## 2020-01-01 RX ADMIN — LEVETIRACETAM 500 MILLIGRAM(S): 250 TABLET, FILM COATED ORAL at 05:50

## 2020-01-02 NOTE — CDI QUERY NOTE - NSCDIOTHERTXTBX_GEN_ALL_CORE_HH
Clinical documentation indicates that this patient has Sepsis.  The Physician's or Provider's documentation of sepsis is clinically supported by the patient's presentation, evaluation and medical management, as indicated below.  There is a need to further clarify the status of sepsis.  Please indicate status of the patient's sepsis diagnosis in your progress notes and discharge summary as : Sepsis treatment continues, or Sepsis resolving, or Sepsis resolved, or Sepsis ruled-out.     SUPPORTING DOCUMENTATION AND/OR CLINICAL EVIDENCE:  presented from Assisted living w/ increased LE swelling, weakness and decreased ambulation on 12/10  found to have ac/chr diast CHF, AF w RVR and UTI  code sepsis called on 12/12  sepsis 2/2 MRSA UTI documented thereafter      Vital Signs on Admission:  95 - 121 HR  18 - 22   RR  98 - 99.9  T    WBC:   10.89                           Bands:   # - 8.47,  % - 77.8%            Lactate:  2.2 on 12/12      Procal:  0.11 on 12/12            Blood Cultures:  neg      Urine Culture: > 100,000 MRSA            Please clarify POA status of sepsis:    STATUS:    Early sepsis present on admission, resolved  Sepsis evolving on admission, resolved  other  clinically not significant      Thank you.

## 2020-01-10 PROBLEM — Z95.2 S/P AVR (AORTIC VALVE REPLACEMENT): Status: ACTIVE | Noted: 2019-01-01

## 2020-01-10 PROBLEM — I50.9 HEART FAILURE, UNSPECIFIED: Chronic | Status: ACTIVE | Noted: 2019-01-01

## 2020-01-10 PROBLEM — R60.0 EDEMA OF LOWER EXTREMITY: Status: ACTIVE | Noted: 2020-01-01

## 2020-01-10 NOTE — ASSESSMENT
[FreeTextEntry1] : EKG demonstrates intermittent ventricular paced rhythm at a moderate rate in the 60s to 70s and underlying atrial fibrillation noted;\par \par In summary the patient is an 84-year-old woman with known history for ischemic heart disease and prior PCI stent, valvular heart disease with stent valve in the aortic position for severe aortic stenosis, permanent pacemaker, paroxysmal atrial fibrillation with failure to thrive and recent urosepsis with multiple somatic complaints;\par \par Plan:\par \par As patient has not been eating or drinking much fluids recommend adjust Lasix to 20 mg daily alternating with 20 mg b.i.d.;\par Continue other medications the same\par \par Nutritional supplementation discussed;\par \par No additional cardiac workup indicated at this time;\par \par Followup within 3-4 months or p.r.n.\par \par Pacemaker interrogation demonstrating normally functioning pacemaker with evidence of moderate amounts of intermittent paroxysmal atrial fibrillation;\par \par Note: Patient is not a good candidate for anticoagulation at this time;;

## 2020-01-10 NOTE — HISTORY OF PRESENT ILLNESS
[FreeTextEntry1] : This necessitated transferring her from assisted living at [place] to [facility];\par \par She is accompanied with her daughter today who notes the decline;\par \par

## 2020-01-10 NOTE — REVIEW OF SYSTEMS
[Feeling Fatigued] : feeling fatigued [Dyspnea on exertion] : dyspnea during exertion [Tremor] : a tremor was seen [Negative] : Heme/Lymph

## 2020-01-10 NOTE — REASON FOR VISIT
[Follow-Up - Clinic] : a clinic follow-up of [FreeTextEntry1] : The patient is an 84-year-old white female with a well known history for valvular heart disease and coronary artery disease who is status post prior multiple PCI's last stents, paroxysmal atrial fibrillation, and status post stent valve replacement for severe aortic stenosis;\par \par Patient also has history of paroxysmal atrial fibrillation and dual-chamber cardiac pacemaker;\par \par Unfortunately, patient was recently hospitalized in late December secondary to "possible urosepsis" and shortness of breath with decompensated COPD/pulmonary fibrosis;\par \par Ultimately she was discharged on O2 nasal cannula and p.o. diuretics for possible superimposed CHF;\par Since discharge from the hospital she has been complaining of general weakness, decreased appetite, poor sleeping habits and somnolence during the daytime with overall "failure to thrive";

## 2020-01-10 NOTE — PHYSICAL EXAM
[Normal Conjunctiva] : the conjunctiva exhibited no abnormalities [Eyelids - No Xanthelasma] : the eyelids demonstrated no xanthelasmas [Normal Oral Mucosa] : normal oral mucosa [Normal Jugular Venous A Waves Present] : normal jugular venous A waves present [No Oral Cyanosis] : no oral cyanosis [No Oral Pallor] : no oral pallor [Normal Jugular Venous V Waves Present] : normal jugular venous V waves present [No Jugular Venous Rendon A Waves] : no jugular venous rendon A waves [Abdomen Soft] : soft [Abdomen Tenderness] : non-tender [] : no hepato-splenomegaly [Abdomen Mass (___ Cm)] : no abdominal mass palpated [No Skin Ulcers] : no skin ulcer [Oriented To Time, Place, And Person] : oriented to person, place, and time [FreeTextEntry1] : Depressed affect noted

## 2020-03-18 NOTE — ED ADULT NURSE REASSESSMENT NOTE - ANCILLARY STATUS
OK to use central line access, IVF initiated, instructed by MD Vázquez to infuse in increments of 250cc, hold Levo for now/physician at bedside

## 2020-03-18 NOTE — ED ADULT NURSE NOTE - CHIEF COMPLAINT QUOTE
[facility] to [facility] as per NH report provided via telephone by Sarah, pt had 4-5 loose stools last night, today dropped BP to SPB 70's. arrives with 24g LH, given 250cc bolus at facility, arrives with 1L infusing.

## 2020-03-18 NOTE — ED PROVIDER NOTE - CLINICAL SUMMARY MEDICAL DECISION MAKING FREE TEXT BOX
cardiac history with diarrhea; eval hypotension; central line for evaluatoin of sepsis; pressors, ivf , abx ct of abd/pelvis

## 2020-03-18 NOTE — H&P ADULT - PROBLEM SELECTOR PLAN 1
Possibly secondary to intraabdominal process vs UTI. Hypovolemic component as well given volume loss via diarrhea and poor PO intake. Bedside POCUS revealed hyperdynamic LV, IVC difficult to visualize. Received ~3L IVF. BP again dropping- added norepinephrine infusion and then fixed dose vasopressin in order to maintain a MAP > 65. Restarted outpatient midodrine dose. Gentle IVF hydration for now to match GI losses. Caution given h/o CHF and propensity for volume overload. Start empiric Zosyn and vancomycin x1 given renal failure. Blood and urine cultures pending. Will send CDiff and GI PCR and start PO vanco.

## 2020-03-18 NOTE — H&P ADULT - ATTENDING COMMENTS
84 yo female with multiple medical comorbidities, presents with hypotension/septic shock, leukocytosis, source either UTI or proctocolitis/ possibly C-diff.  We are cautious with overly aggressive fluid resuscitation given her CHF and aortic stenosis.  DNR/DNI as well.

## 2020-03-18 NOTE — PATIENT PROFILE ADULT - LIVING ENVIRONMENT
Patient bedrest complete. Patient denies any pain, numbness, or tingling. No bleeding at groin site. Distal pulses palpable. Patient up to use restroom.    no

## 2020-03-18 NOTE — ED ADULT NURSE NOTE - PMH
Aortic stenosis    Atrial fibrillation    CHF exacerbation    COPD (chronic obstructive pulmonary disease)  2L continuous O2  Gait abnormality    Heart failure    HTN (hypertension)    Hyperlipidemia    Hypothyroidism    PAF (paroxysmal atrial fibrillation)    Seizure    Skin cancer

## 2020-03-18 NOTE — ED ADULT TRIAGE NOTE - CHIEF COMPLAINT QUOTE
[facility] secondary to c-diff. as per NH report provided via telephone by Sarah, pt had 4-5 loose stools last night, today dropped BP to SPB 70's. arrives with 24g LH, given 250cc bolus at facility, arrives with 1L infusing.

## 2020-03-18 NOTE — H&P ADULT - NSICDXPASTMEDICALHX_GEN_ALL_CORE_FT
PAST MEDICAL HISTORY:  Aortic stenosis     Atrial fibrillation     CAD (coronary artery disease)     CHF exacerbation     COPD (chronic obstructive pulmonary disease) 2L continuous O2    Gait abnormality     Heart failure     HTN (hypertension)     Hyperlipidemia     Hypothyroidism     PAF (paroxysmal atrial fibrillation)     Seizure     Skin cancer

## 2020-03-18 NOTE — ED PROVIDER NOTE - OBJECTIVE STATEMENT
84 yo female pmh aortic stenosis, chf, atrial fibrillation comes to ed with 3 days of diarrhea; pt noted with hypotension and treated with  iv fluids; pt denies any chest pain , abdominal pain , nausea or vomiting

## 2020-03-18 NOTE — H&P ADULT - HISTORY OF PRESENT ILLNESS
84 y/o F with a h/o HTN, HLD, CAD, pAF, CHF, AS (s/p TAVR), PPM, seizure disorder, 84 y/o F with a h/o HTN, HLD, CAD (med management with ASA/Plavix), AF (no a/c), dCHF, AS (s/p TAVR), PPM, seizure disorder, COPD, pulmonary fibrosis, hypothyroid, presents to ED c/o 3 days of recurrent diarrhea. Patient noted to be hypotensive in ED despite volume resuscitation and was subsequently started on IV vasopressor therapy. Denies SOB, chest pain, n/v, or abdominal pain. CT A/P reveals proctitis. UA(+). SHIRLEY. She reports poor PO intake over the past several days.

## 2020-03-18 NOTE — H&P ADULT - PROBLEM SELECTOR PLAN 2
Ischemic ATN given shock state? Continue optimizing end-organ perfusion with IV vasopressor and IVF. Trend BUN/Cr. Monitor lytes and UOP. Avoid nephrotoxic agents and renally dose medications as necessary.

## 2020-03-18 NOTE — ED ADULT NURSE NOTE - AS SC BRADEN SENSORY
Would need to be seen for eval, he can call UC back and report no improvement. What sx is he having?   (3) slightly limited

## 2020-03-18 NOTE — H&P ADULT - PROBLEM SELECTOR PLAN 6
Does not appear volume overloaded at this time. Monitor closely for this given aggressive volume loading. May require de-resuscitation down the road.

## 2020-03-18 NOTE — ED ADULT NURSE NOTE - OBJECTIVE STATEMENT
pt DNR/DNI sent in by [facility] with reports of hypotension s/p 4-5 episodes of diarrhea last night. pt awake and alert, knows she is in a hospital, confused to which one, denies any complaints. explained her BP is very low and NH reported diarrhea, pt reports she has not been eating and drinking as her usual. arrive with 24g LH with 1L slowly infusing, leakage noted around site. MD Vázquez at bedside, code sepsis called, MD attempting to gain IV access.

## 2020-03-18 NOTE — H&P ADULT - ASSESSMENT
84 y/o F with a h/o HTN, HLD, CAD (med management with ASA/Plavix), AF (no a/c), dCHF, AS (s/p TAVR), PPM, seizure disorder, COPD, pulmonary fibrosis, hypothyroid, with septic/hypovolemic shock, dehydration, enterocolitis, UTI, SHIRLEY, hypokalemia.    Case discussed in detail with MICU attending, Dr. Terrell.        CRITICAL CARE TIME SPENT: 52 mins  Time spent evaluating/treating patient with medical issues that pose a high risk for life threatening deterioration and/or end-organ damage, reviewing data/labs/imaging, discussing case with multidisciplinary team, discussing plan/goals of care with patient/family. Non-inclusive of procedure time.

## 2020-03-18 NOTE — ED PROVIDER NOTE - PRINCIPAL DIAGNOSIS
Assessment/Plan:    Problem List Items Addressed This Visit     None      Visit Diagnoses     Laceration of scalp, subsequent encounter    -  Primary    healing well, 2 staple removed  Concussion without loss of consciousness, subsequent encounter        Improving with rest   Patient will return to work next week with restrictions  Screening for cardiovascular condition        Relevant Orders    Comprehensive metabolic panel    Lipid Panel with Direct LDL reflex    Encounter for long-term current use of medication        Relevant Orders    CBC    TSH, 3rd generation              There are no Patient Instructions on file for this visit  Return if symptoms worsen or fail to improve, for Annual physical in June  Subjective:      Patient ID: Tatyana Sprague is a 54 y o  female  Chief Complaint   Patient presents with    suture removal     back of head-fell on ice-lj       She fell down her side step on the ice  She hit the back of her head  She went to the emergency room and was evaluated on 2/20/19  She was vomiting as well  She is still recovering from the concussion  The following portions of the patient's history were reviewed and updated as appropriate:  past social history    Review of Systems      Current Outpatient Medications   Medication Sig Dispense Refill    EPINEPHrine (EPIPEN 2-JUANITO) 0 3 mg/0 3 mL SOAJ Inject as directed      famotidine (PEPCID) 40 MG tablet Take by mouth      Loratadine (CLARITIN) 10 MG CAPS Take by mouth daily      meclizine (ANTIVERT) 25 mg tablet Take 1 tablet (25 mg total) by mouth 3 (three) times a day as needed for dizziness 30 tablet 0    ondansetron (ZOFRAN) 4 mg tablet Take 1 tablet (4 mg total) by mouth every 6 (six) hours 12 tablet 0    Triamcinolone Acetonide (NASACORT AQ NA) into each nostril       No current facility-administered medications for this visit          Objective:    /76   Pulse 84   Temp (!) 97 4 °F (36 3 °C)   Resp 16 Ht 5' 2" (1 575 m)   Wt 83 5 kg (184 lb)   LMP 01/30/2019   BMI 33 65 kg/m²        Physical Exam   Constitutional: She appears well-developed and well-nourished  HENT:   Head: Normocephalic and atraumatic  Right Ear: External ear normal    Left Ear: External ear normal    Mouth/Throat: Oropharynx is clear and moist    Cardiovascular: Normal rate, regular rhythm and normal heart sounds  Exam reveals no friction rub  No murmur heard  Pulmonary/Chest: Effort normal and breath sounds normal  No respiratory distress  She has no wheezes  She has no rales  Musculoskeletal: She exhibits no edema or deformity  Skin:   Small laceration on posterior scalp  Two staples were in place  Wound is clean dry and intact  Staples removed using staple remover  Patient tolerated procedure well  Nursing note and vitals reviewed               Kaushik Laboy DO Hypotension, unspecified hypotension type

## 2020-03-18 NOTE — ED ADULT NURSE NOTE - NS ED NURSE PRESS ULCER LOCATION 11
Refill request received from pharmacy for lantus and humalog     OV note from 5.15.18 states:     Continue Lantus 40 units HS    Script for humalog denied as it was refilled on 7.9.18    Script for lantus sent to pharmacy     FUV: 11.19.18      
coccyx

## 2020-03-19 NOTE — DIETITIAN INITIAL EVALUATION ADULT. - ETIOLOGY
related to inability to meet sufficient protein-energy in setting of advanced age, COPD, altered GI function secondary to enterocolitis and diarrhea, and poor appetite PTA

## 2020-03-19 NOTE — DIETITIAN INITIAL EVALUATION ADULT. - ADD RECOMMEND
Rx: MVI daily as feasible. Rx: Bacid and Banatrol TID as feasible to support gut integrity. Obtain daily weights to monitor trends.

## 2020-03-19 NOTE — DIETITIAN INITIAL EVALUATION ADULT. - MALNUTRITION
limited NFPE: severe muscle loss of temples, clavicles, shoulders; severe fat loss of orbitals severe, chronic

## 2020-03-19 NOTE — DIETITIAN INITIAL EVALUATION ADULT. - OTHER INFO
85 year old female with a h/o HTN, HLD, CAD (med management with ASA/Plavix), AF (no a/c), dCHF, AS (s/p TAVR), PPM, seizure disorder, COPD, pulmonary fibrosis, hypothyroid, with septic/hypovolemic shock, dehydration, enterocolitis, UTI, SHIRLEY, hypokalemia. Per documented, pt with diarrhea and poor po intake several days PTA. Diarrhea ongoing. Attempted to interview, pt lethargic and appears confused. Per EMR review, pts weight in 12/2019 was 153 lbs, current admission weight is 129.6 lbs, if accurate would be indicative of a ~23 lb wt loss x ~3 months. Pt is currently NPO.

## 2020-03-19 NOTE — PROGRESS NOTE ADULT - SUBJECTIVE AND OBJECTIVE BOX
Patient is a 85y old  Female who presents with a chief complaint of Septic shock (18 Mar 2020 21:17)      HPI/ BRIEF HOSPITAL COURSE:     86 y/o F with a h/o HTN, HLD, CAD (med management with ASA/Plavix), AF (no a/c), dCHF, AS (s/p TAVR), PPM, seizure disorder, COPD, pulmonary fibrosis, hypothyroid, presents to ED c/o 3 days of recurrent diarrhea. Patient noted to be hypotensive in ED despite volume resuscitation and was subsequently started on IV vasopressor therapy. Denies SOB, chest pain, n/v, or abdominal pain. CT A/P reveals proctitis. UA(+). SHIRLEY. She reports poor PO intake over the past several days.  Admitted with septic shock from GI or  source requiring IVF/ 2 pressors; r/o CDAD    Remained lethargic but arousable   Remained on 2 pressor since admission     PAST MEDICAL & SURGICAL HISTORY:  CAD (coronary artery disease)  Gait abnormality  HTN (hypertension)  Seizure  Atrial fibrillation  COPD (chronic obstructive pulmonary disease): 2L continuous O2  Hypothyroidism  Heart failure  CHF exacerbation  PAF (paroxysmal atrial fibrillation)  Skin cancer  Hyperlipidemia  Aortic stenosis  Bilateral cataracts  S/P TAVR (transcatheter aortic valve replacement)  Pacemaker    All systems reviewed with symptoms mentions as above.       Physical Examination:    ICU Vital Signs Last 24 Hrs  T(C): 36 (19 Mar 2020 12:00), Max: 37.1 (18 Mar 2020 16:08)  T(F): 96.8 (19 Mar 2020 12:00), Max: 98.8 (18 Mar 2020 16:08)  HR: 70 (19 Mar 2020 11:00) (69 - 100)  BP: 103/49 (19 Mar 2020 05:30) (54/36 - 145/73)  BP(mean): 71 (19 Mar 2020 05:30) (43 - 112)  ABP: 126/46 (19 Mar 2020 11:00) (107/35 - 146/55)  ABP(mean): 71 (19 Mar 2020 11:00) (55 - 84)  RR: 19 (19 Mar 2020 11:00) (17 - 40)  SpO2: 100% (19 Mar 2020 11:00) (95% - 100%)      General: No acute distress.      Neuro: Lethargic but arousable then O*2-3, No motor, sensory, or cranial nerve deficit    HEENT: Pupils equal, reactive to light, Oral mucosa dry    PULM: Clear to auscultation bilaterally except decreased at bases, no significant adventitious breath sounds     CVS: Regular rhythm and controlled rate, no murmurs, rubs, or gallops    ABD: Soft, nondistended, nontender, normoactive bowel sounds, no CVA tenderness    EXT: No b/l LE edema, nontender with pedal pulse palpable     SKIN: Warm and well perfused, no acute rashes       Medications:  piperacillin/tazobactam IVPB.. 3.375 Gram(s) IV Intermittent every 8 hours  vancomycin    Solution 125 milliGRAM(s) Oral every 6 hours    aMIOdarone    Tablet 200 milliGRAM(s) Oral daily  digoxin     Tablet 0.125 milliGRAM(s) Oral daily  midodrine 15 milliGRAM(s) Oral every 8 hours  norepinephrine Infusion 0.05 MICROgram(s)/kG/Min IV Continuous <Continuous>      levETIRAcetam 500 milliGRAM(s) Oral two times a day      aspirin  chewable 81 milliGRAM(s) Oral daily  clopidogrel Tablet 75 milliGRAM(s) Oral daily  heparin  Injectable 5000 Unit(s) SubCutaneous every 8 hours        atorvastatin 20 milliGRAM(s) Oral at bedtime  hydrocortisone sodium succinate Injectable 100 milliGRAM(s) IV Push every 8 hours  levothyroxine 50 MICROGram(s) Oral daily  vasopressin Infusion 0.04 Unit(s)/Min IV Continuous <Continuous>    lactated ringers. 1000 milliLiter(s) IV Continuous <Continuous>    influenza   Vaccine 0.5 milliLiter(s) IntraMuscular once    chlorhexidine 2% Cloths 1 Application(s) Topical <User Schedule>            I&O's Detail    18 Mar 2020 07:01  -  19 Mar 2020 07:00  --------------------------------------------------------  IN:    lactated ringers.: 825 mL    norepinephrine Infusion: 248.2 mL    Sodium Chloride 0.9% IV Bolus: 1800 mL    Solution: 300 mL    Solution: 150 mL    vasopressin Infusion: 19.2 mL  Total IN: 3342.4 mL    OUT:    Voided: 735 mL  Total OUT: 735 mL    Total NET: 2607.4 mL      19 Mar 2020 07:01  -  19 Mar 2020 13:16  --------------------------------------------------------  IN:    lactated ringers.: 300 mL    norepinephrine Infusion: 60 mL    Solution: 50 mL    vasopressin Infusion: 9.6 mL  Total IN: 419.6 mL    OUT:  Total OUT: 0 mL    Total NET: 419.6 mL    RADIOLOGY/ Microbiology/ Labs: reviewed     I have personally provided 45  minutes of critical care time excluding time spent on separate procedures

## 2020-03-19 NOTE — DIETITIAN INITIAL EVALUATION ADULT. - CONTINUE CURRENT NUTRITION CARE PLAN
yes/-- advance diet as medically feasible/tolerable; if concerns for chewing/swallowing difficulty, recommend SLP swallow evaluation.

## 2020-03-19 NOTE — DIETITIAN INITIAL EVALUATION ADULT. - PERTINENT LABORATORY DATA
03-19 Na133 mmol/L<L> Glu 133 mg/dL<H> K+ 3.7 mmol/L Cr  1.07 mg/dL BUN 17.0 mg/dL Phos 2.9 mg/dL Alb 2.0 g/dL<L> PAB n/a

## 2020-03-19 NOTE — DIETITIAN INITIAL EVALUATION ADULT. - NS FNS WEIGHT USED FOR CALC
[place] for meeting with pt parents, [facility] , NANCY .     Summary.    Pt is in hospital. Hospital team stating he does not meet criteria to be here, recommending discharge. However, there is nowhere for him to go    Parents willing to take home but only with a very high level of supports in home. These supports are not recommended or able to be funded by [facility] in this case, nor generally.    [facility] not recommending out of home placement.    Parents have treatment goals they wish for pt to meet in hospital. Team notes these goals, does not necessarily feel that these goals have to be met prior to discharge. Team recommends continued work on these goals outside of hospital.      Goals of meeting:    Therapy discussion: what are effective therapeutic goals for patient and family at this moment, while pt is in hospital?    Team is recommending therapy with parents only to address family dynamics.     Team will also work with pt individually on what is effective for him re: parents.      Case management discussion:    Placement is important, affects Bill's care. Is at a standstill with a gap between parent requests and county abilities.    How do we take steps forward?      Case management review:    [facility] to review what they're able to fund/provide now    Parents requests are much higher than this. How does that feel?    At this time, is there a realistic, short-term path for parents being willing to take patient home from hospital. Or, given what the [facility] can provide currently, or in future, is that a non starter at this time?    Given that how to we proceed from a case management perspective? Does CPS need to get involved, screening committee, etc?       admission

## 2020-03-19 NOTE — CHART NOTE - NSCHARTNOTEFT_GEN_A_CORE
Upon Nutritional Assessment by the Registered Dietitian your patient was determined to meet criteria / has evidence of the following diagnosis/diagnoses:          [ ]  Mild Protein Calorie Malnutrition        [ ]  Moderate Protein Calorie Malnutrition        [x ] Severe Protein Calorie Malnutrition        [ ] Unspecified Protein Calorie Malnutrition        [ ] Underweight / BMI <19        [ ] Morbid Obesity / BMI > 40    Pt presents at high nutrition risk secondary to malnutrition (severe, chronic) related to inability to meet sufficient protein-energy in setting of advanced age, COPD, altered GI function secondary to enterocolitis and diarrhea, and poor appetite PTA as evidenced by likely meeting <75% nutrient needs >1 month, severe muscle loss of temples, clavicles, shoulders; severe fat loss of orbitals, probable 15% wt loss x ~3 months.    Findings as based on:  •  Comprehensive nutrition assessment and consultation  •  Calorie counts (nutrient intake analysis)  •  Food acceptance and intake status from observations by staff  •  Follow up  •  Patient education  •  Intervention secondary to interdisciplinary rounds  •   concerns      Treatment:    The following has been recommended:  1) Advance diet as medically feasible/tolerable; if concerns for chewing/swallowing difficulty, recommend SLP swallow evaluation.  2) Rx: MVI daily as feasible.   3) Rx: Bacid and Banatrol TID as feasible to support gut integrity.   4) Obtain daily weights to monitor trends.      PROVIDER Section:     By signing this assessment you are acknowledging and agree with the diagnosis/diagnoses assigned by the Registered Dietitian    Comments:

## 2020-03-19 NOTE — DIETITIAN INITIAL EVALUATION ADULT. - PERTINENT MEDS FT
MEDICATIONS  (STANDING):  aMIOdarone    Tablet 200 milliGRAM(s) Oral daily  aspirin  chewable 81 milliGRAM(s) Oral daily  atorvastatin 20 milliGRAM(s) Oral at bedtime  chlorhexidine 2% Cloths 1 Application(s) Topical <User Schedule>  clopidogrel Tablet 75 milliGRAM(s) Oral daily  digoxin     Tablet 0.125 milliGRAM(s) Oral daily  heparin  Injectable 5000 Unit(s) SubCutaneous every 8 hours  influenza   Vaccine 0.5 milliLiter(s) IntraMuscular once  lactated ringers. 1000 milliLiter(s) (75 mL/Hr) IV Continuous <Continuous>  levETIRAcetam 500 milliGRAM(s) Oral two times a day  levothyroxine 50 MICROGram(s) Oral daily  midodrine 10 milliGRAM(s) Oral every 8 hours  norepinephrine Infusion 0.05 MICROgram(s)/kG/Min (5.44 mL/Hr) IV Continuous <Continuous>  piperacillin/tazobactam IVPB.. 3.375 Gram(s) IV Intermittent every 8 hours  potassium chloride  10 mEq/100 mL IVPB 10 milliEquivalent(s) IV Intermittent every 1 hour  vancomycin    Solution 125 milliGRAM(s) Oral every 6 hours  vasopressin Infusion 0.04 Unit(s)/Min (2.4 mL/Hr) IV Continuous <Continuous>    MEDICATIONS  (PRN):

## 2020-03-19 NOTE — PROGRESS NOTE ADULT - ASSESSMENT
1: Septic shock:   2: UTI vs CDAD vs proctitis r/o bacteremia   3: SHIRLEY with hyponatremia, hypochloremia   4: HTN, HLD, CAD (med management with ASA/Plavix), AF (no a/c), dCHF, AS (s/p TAVR), PPM, seizure disorder, COPD, pulmonary fibrosis, hypothyroid  5: Acute metabolic encephalopathy       Patient seen and examined   Needs ICU care with 2 pressor shock having to add other meds and at risk for cardiovascular collapse, cardiac arrest    increase levophed along with fixed dose vasopressin for now along with addition of Solu-cortef and midodrine Po to help assist MAP > 65 for now; pending TTE; C/w IVF, s/p BOLUS on admission  Close urine output/lactate trend/renal adjustments of meds/electrolyte replacement as needed   Stool studies pending; pending Cx  Empiric Vanco IV *1; will continue; PO vanco at 125 q6 while testing for CDAD; c/w empiric Zosyn for now   Diet to be advance as mentation improved  C/w MICU care for now

## 2020-03-20 NOTE — PROGRESS NOTE ADULT - SUBJECTIVE AND OBJECTIVE BOX
Patient is a 85y old  Female who presents with a chief complaint of Septic shock (19 Mar 2020 13:16)    BRIEF HOSPITAL COURSE:   86 y/o F with a h/o HTN, HLD, CAD (med management with ASA/Plavix), AF (no a/c), dCHF, AS (s/p TAVR), PPM, seizure disorder, COPD, pulmonary fibrosis, hypothyroid, presents to ED c/o 3 days of recurrent diarrhea. Patient noted to be hypotensive in ED despite volume resuscitation and was subsequently started on IV vasopressor therapy. Denies SOB, chest pain, n/v, or abdominal pain. CT A/P reveals proctitis. UA(+). SHIRLEY. She reports poor PO intake over the past several days.  Admitted with septic shock from GI or  source requiring IVF/ 2 pressors    Events last 24 hours:   Pt was dependant on 2 pressors overnight. Afebrile. Easily arousable but does not follow commands    PAST MEDICAL & SURGICAL HISTORY:  CAD (coronary artery disease)  Gait abnormality  HTN (hypertension)  Seizure  Atrial fibrillation  COPD (chronic obstructive pulmonary disease): 2L continuous O2  Hypothyroidism  Heart failure  CHF exacerbation  PAF (paroxysmal atrial fibrillation)  Skin cancer  Hyperlipidemia  Aortic stenosis  Bilateral cataracts  S/P TAVR (transcatheter aortic valve replacement)  Pacemaker    Review of Systems:  Could not assess    Physical Examination:    ICU Vital Signs Last 24 Hrs  T(C): 36.1 (20 Mar 2020 11:40), Max: 36.5 (20 Mar 2020 00:00)  T(F): 97 (20 Mar 2020 11:40), Max: 97.7 (20 Mar 2020 00:00)  HR: 70 (20 Mar 2020 13:00) (70 - 103)  BP: --  BP(mean): --  ABP: 105/33 (20 Mar 2020 13:00) (101/39 - 146/58)  ABP(mean): 54 (20 Mar 2020 13:00) (54 - 86)  RR: 47 (20 Mar 2020 13:00) (15 - 47)  SpO2: 100% (20 Mar 2020 13:00) (98% - 100%)      Neuro: AAO*1, No motor, sensory, or cranial nerve deficit    HEENT: Pupils equal, reactive to light, Moist oral mucosa    PULM: Clear to auscultation bilaterally, no significant adventitious breath sounds     CVS: Regular rhythm and controlled rate, no murmurs, rubs, or gallops    ABD: Soft, nondistended, nontender, normoactive bowel sounds    EXT: No b/l LE edema, nontender with pedal pulse palpable     SKIN: Warm and well perfused, no acute rashes       Medications:  piperacillin/tazobactam IVPB.. 3.375 Gram(s) IV Intermittent every 8 hours  vancomycin  IVPB 750 milliGRAM(s) IV Intermittent <User Schedule>    aMIOdarone    Tablet 200 milliGRAM(s) Oral daily  digoxin     Tablet 0.125 milliGRAM(s) Oral daily  midodrine 15 milliGRAM(s) Oral every 8 hours  norepinephrine Infusion 0.05 MICROgram(s)/kG/Min IV Continuous <Continuous>      levETIRAcetam 500 milliGRAM(s) Oral two times a day      aspirin  chewable 81 milliGRAM(s) Oral daily  clopidogrel Tablet 75 milliGRAM(s) Oral daily  heparin  Injectable 5000 Unit(s) SubCutaneous every 8 hours        atorvastatin 20 milliGRAM(s) Oral at bedtime  hydrocortisone sodium succinate Injectable 100 milliGRAM(s) IV Push every 8 hours  levothyroxine 50 MICROGram(s) Oral daily  vasopressin Infusion 0.04 Unit(s)/Min IV Continuous <Continuous>    multiple electrolytes Injection Type 1 1000 milliLiter(s) IV Continuous <Continuous>    influenza   Vaccine 0.5 milliLiter(s) IntraMuscular once    chlorhexidine 2% Cloths 1 Application(s) Topical <User Schedule>            I&O's Detail    19 Mar 2020 07:01  -  20 Mar 2020 07:00  --------------------------------------------------------  IN:    lactated ringers.: 525 mL    multiple electrolytes Injection Type 1: 1275 mL    multiple electrolytes Injection Type 1 Bolus: 500 mL    norepinephrine Infusion: 220.7 mL    Solution: 300 mL    Solution: 225 mL    vasopressin Infusion: 57.6 mL  Total IN: 3103.3 mL    OUT:    Indwelling Catheter - Urethral: 890 mL  Total OUT: 890 mL    Total NET: 2213.3 mL            RADIOLOGY/ Microbiology/ Labs: reviewed

## 2020-03-20 NOTE — PROVIDER CONTACT NOTE (EICU) - ASSESSMENT
AM labs with K+ of 3.8 and phos level of 2.2
85/49, hr 70, pulse ox 100% RR 24
Serum potassium 3.7, magnesium 1.8  BUN/Cr 17/1.07

## 2020-03-20 NOTE — PROVIDER CONTACT NOTE (EICU) - ACTION/TREATMENT ORDERED:
As per [place] electrolyte standard will give 15 millimoles of KPHOS
A.M labs ordered.
Bedside GONZALO Spicer requesting Vasopressin drip order, ordered as requested.
10mEq IV potassium x3 and 1 gram of IV magnesium ordered for repletion.

## 2020-03-20 NOTE — PROGRESS NOTE ADULT - ASSESSMENT
Septic shock  Proctitis  COPD/pulmonary fibrosis  CAD  Afib (not on AC)  AS (s/p TAVR)  s/p PPM, seizure disorder    Neuro: No active issues  Cardiovascular: Remains dependant on 2 pressors and midodrine. Aim for MAP target 65. On ASA, Plavix and statin. On Amio and digoxin for rate control  Resp/Chest: Maintain SpO2 > 92%  GI/Nutrition: Bowel regimen as needed  ID: On empiric Vanco and Zosyn. F/u BCx. GI PCR grossly normal  Renal: Avoid nephrotoxic agents. Strict I&O  Endocrinology: Maintain Blood sugar < 180. ISS as per protocol. Continue levothyroxine  Haem/Oncology:  DVT ppx w/ HSQ    Critical Care time: 35 minutes assessing presenting problems of acute illness that poses high probability of life threatening deterioration or end organ damage/dysfunction.  Medical decision making including Initiating plan of care, reviewing data, reviewing radiology, discussing with multidisciplinary team, non inclusive of procedures

## 2020-03-21 NOTE — PROCEDURE NOTE - NSPOSTCAREGUIDE_GEN_A_CORE
Instructed patient/caregiver to follow-up with primary care physician/Verbal/written post procedure instructions were given to patient/caregiver
Care for catheter as per unit/ICU protocols

## 2020-03-21 NOTE — CHART NOTE - NSCHARTNOTEFT_GEN_A_CORE
Source: Patient [ ]  Family [ ]   other [ x]    Current Diet: Diet, Dysphagia 2 Mechanical Soft-Nectar Consistency Fluid (03-19-20 @ 15:33)      PO intake:  < 50% [x ]   50-75%  [ ]   %  [ ]  other :    Current Weight:   (3/21) 145.5 lbs  (3/20) 138 lbs  (3/18) 129.6 lbs  ? accuracy of weights, noted with 1+ generalized edema, continue to trend and maintain strict Is&Os     Pertinent Medications: MEDICATIONS  (STANDING):  aMIOdarone    Tablet 200 milliGRAM(s) Oral daily  aspirin  chewable 81 milliGRAM(s) Oral daily  atorvastatin 20 milliGRAM(s) Oral at bedtime  chlorhexidine 2% Cloths 1 Application(s) Topical <User Schedule>  clopidogrel Tablet 75 milliGRAM(s) Oral daily  digoxin     Tablet 0.125 milliGRAM(s) Oral daily  heparin  Injectable 5000 Unit(s) SubCutaneous every 8 hours  hydrocortisone sodium succinate Injectable 100 milliGRAM(s) IV Push every 8 hours  influenza   Vaccine 0.5 milliLiter(s) IntraMuscular once  levETIRAcetam 500 milliGRAM(s) Oral two times a day  levothyroxine 50 MICROGram(s) Oral daily  midodrine 20 milliGRAM(s) Oral every 8 hours  multiple electrolytes Injection Type 1 1000 milliLiter(s) (75 mL/Hr) IV Continuous <Continuous>  norepinephrine Infusion 0.05 MICROgram(s)/kG/Min (5.44 mL/Hr) IV Continuous <Continuous>  piperacillin/tazobactam IVPB.. 3.375 Gram(s) IV Intermittent every 8 hours  vancomycin  IVPB 750 milliGRAM(s) IV Intermittent <User Schedule>    MEDICATIONS  (PRN):    Pertinent Labs: CBC Full  -  ( 21 Mar 2020 07:07 )  WBC Count : 22.13 K/uL  RBC Count : 2.95 M/uL  Hemoglobin : 9.0 g/dL  Hematocrit : 28.2 %  Platelet Count - Automated : 243 K/uL  Mean Cell Volume : 95.6 fl  Mean Cell Hemoglobin : 30.5 pg  Mean Cell Hemoglobin Concentration : 31.9 gm/dL  Auto Neutrophil # : 20.09 K/uL  Auto Lymphocyte # : 1.03 K/uL  Auto Monocyte # : 0.73 K/uL  Auto Eosinophil # : 0.01 K/uL  Auto Basophil # : 0.04 K/uL  Auto Neutrophil % : 90.8 %  Auto Lymphocyte % : 4.7 %  Auto Monocyte % : 3.3 %  Auto Eosinophil % : 0.0 %  Auto Basophil % : 0.2 %    03-21 Na138 mmol/L Glu 89 mg/dL K+ 3.4 mmol/L<L> Cr  0.57 mg/dL BUN 9.0 mg/dL Phos 2.3 mg/dL<L> Alb n/a   PAB n/a       Skin: Intact per documentation     Nutrition focused physical exam previously conducted - found signs of malnutrition [ ]absent [ x]present    Subcutaneous fat loss: [ x] Orbital fat pads region, [ ]Buccal fat region, [ ]Triceps region,  [ ]Ribs region    Muscle wasting: [x ]Temples region, [x ]Clavicle region, [x ]Shoulder region, [ ]Scapula region, [ ]Interosseous region,  [ ]thigh region, [ ]Calf region    Estimated Needs:   [x ] no change since previous assessment  [ ] recalculated:     Current Nutrition Diagnosis: Pt remains at high nutrition risk secondary to malnutrition (severe, chronic) related to inability to meet sufficient protein-energy in setting of advanced age, COPD, altered GI function secondary to enterocolitis and diarrhea, and poor appetite PTA as evidenced by likely meeting <75% nutrient needs >1 month, severe muscle loss of temples, clavicles, shoulders; severe fat loss of orbitals, probable 15% wt loss x ~3 months. Pt very lethargic, remains with suboptimal po intake. Continues to have loose BM.     Recommendations:   1) Continue diet as tolerated.  2) Add Ensure Enlive TID (nectar thick) to optimize po intake and provide an additional 350 kcal, 20g protein per serving.   3) Rx: MVI daily as feasible.   4) Rx: Bacid and Banatrol TID as feasible to support gut integrity.   5) Obtain daily weights to monitor trends.    Monitoring and Evaluation:   [x ] PO intake [x ] Tolerance to diet prescription [X] Weights  [X] Follow up per protocol [X] Labs

## 2020-03-21 NOTE — PROCEDURE NOTE - ADDITIONAL PROCEDURE DETAILS
Patient tolerated procedure well. All sharps discarded safely.
Patient with septic/hypovolemic shock, enterocolitis, UTI, hypokalemia. Escalating IV vasopressor requirement. Unable to thread wire in left axillae. Labile BPs. Arterial line placed emergently in left femoral artery. Procedure performed independently of critical care time.

## 2020-03-21 NOTE — PROGRESS NOTE ADULT - ASSESSMENT
84 yo female with HTN, HLD, CAD, AF not on AC, CHFpEF, AS s/p TAVR, PPM, seizures, COPD, ILD, hypothyroid, presented from nursing home on 3/18 with several days of diarrhea, found to be hypotensive in ED requiring pressors.  Found to have proctitis on CT abdomen  Covered empirically with abx, cultures negative to date. GI PCR negative.  Cdiff sent but refused by lab as it was not liquid enough.    Septic Shock secondary to Proctocolitis  - WBC slowly trending down; also on steroids  - CT chest and abdomen reviewed  - check procal  - continue vanco and zosyn  - GI PCR negative  - blood cultures no growth to date  - taper steroids if BP permits  - IVF discontinued earlier by MICU  - consider ID consult if leukocytosis does not improve    CAD  -asymptomatic   -continue asa, plavix, statin    Afib s/p PPM  - continue digoxin and amiodarone  - not a candidate for AC    HTN  -hold anti-HTN due to shock  -BP stable on midodrine and steroids    HFpEF  -appears hypervolemic but IVF stopped  -no respiratory decompensation  -will hold off on lasix today due to marginal BP but consider starting in 24 hours  -strict I/Os, daily weights    AS s/p TAVR  -outpatient cardio follow up    COPD/ILD  -continue supplemental O2, bronchodilators as needed  -on IV steroids as above; not bronchospastic  -CT chest with pulmonary fibrosis  -outpatient pulm follow up    Seizure Disorder  -continue keppra  -seizure precautions    Hypothyroidism  -check TSH  -continue synthroid    Anemia, likely anemia of chronic disease  -Hb relatively stable  -on DAPT and dvt ppx  -check iron studies  -transfuse if Hb< 7    DVT ppx -Heparin SC    Dispo - PT evaluation; will discuss consideration of hospice services with patient's family.

## 2020-03-21 NOTE — PROGRESS NOTE ADULT - SUBJECTIVE AND OBJECTIVE BOX
On Admission  03-18-20 (3d)  HPI:  86 y/o F with a h/o HTN, HLD, CAD (med management with ASA/Plavix), AF (no a/c), dCHF, AS (s/p TAVR), PPM, seizure disorder, COPD, pulmonary fibrosis, hypothyroid, presents to ED c/o 3 days of recurrent diarrhea. Patient noted to be hypotensive in ED despite volume resuscitation and was subsequently started on IV vasopressor therapy. Denies SOB, chest pain, n/v, or abdominal pain. CT A/P reveals proctitis. UA(+). SHIRLEY. She reports poor PO intake over the past several days. (18 Mar 2020 21:17)    PAST MEDICAL & SURGICAL HISTORY:  CAD (coronary artery disease)  Gait abnormality  HTN (hypertension)  Seizure  Atrial fibrillation  COPD (chronic obstructive pulmonary disease): 2L continuous O2  Hypothyroidism  Heart failure  CHF exacerbation  PAF (paroxysmal atrial fibrillation)  Skin cancer  Hyperlipidemia  Aortic stenosis  Bilateral cataracts  S/P TAVR (transcatheter aortic valve replacement)  Pacemaker      Antimicrobial:  piperacillin/tazobactam IVPB.. 3.375 Gram(s) IV Intermittent every 8 hours  vancomycin  IVPB 750 milliGRAM(s) IV Intermittent <User Schedule>    Cardiovascular:  aMIOdarone    Tablet 200 milliGRAM(s) Oral daily  digoxin     Tablet 0.125 milliGRAM(s) Oral daily  midodrine 20 milliGRAM(s) Oral every 8 hours  norepinephrine Infusion 0.05 MICROgram(s)/kG/Min IV Continuous <Continuous>    Pulmonary:    Hematalogic:  aspirin  chewable 81 milliGRAM(s) Oral daily  clopidogrel Tablet 75 milliGRAM(s) Oral daily  heparin  Injectable 5000 Unit(s) SubCutaneous every 8 hours    Other:  atorvastatin 20 milliGRAM(s) Oral at bedtime  chlorhexidine 2% Cloths 1 Application(s) Topical <User Schedule>  hydrocortisone sodium succinate Injectable 100 milliGRAM(s) IV Push every 8 hours  influenza   Vaccine 0.5 milliLiter(s) IntraMuscular once  levETIRAcetam 500 milliGRAM(s) Oral two times a day  levothyroxine 50 MICROGram(s) Oral daily      Drug Dosing Weight  Height (cm): 157.5 (18 Mar 2020 21:30)  Weight (kg): 58.8 (18 Mar 2020 21:30)  BMI (kg/m2): 23.7 (18 Mar 2020 21:30)  BSA (m2): 1.59 (18 Mar 2020 21:30)    T(C): 35.6 (03-21-20 @ 09:00), Max: 36.4 (03-20-20 @ 16:00)  HR: 70 (03-21-20 @ 10:00)  BP: --  BP(mean): --  ABP: 120/55 (03-21-20 @ 10:00)  ABP(mean): 78 (03-21-20 @ 10:00)  RR: 18 (03-21-20 @ 10:00)  SpO2: 100% (03-21-20 @ 10:00)          03-20 @ 07:01  -  03-21 @ 07:00  --------------------------------------------------------  IN: 2232 mL / OUT: 1325 mL / NET: 907 mL              LABS:  CBC Full  -  ( 21 Mar 2020 07:07 )  WBC Count : 22.13 K/uL  RBC Count : 2.95 M/uL  Hemoglobin : 9.0 g/dL  Hematocrit : 28.2 %  Platelet Count - Automated : 243 K/uL  Mean Cell Volume : 95.6 fl  Mean Cell Hemoglobin : 30.5 pg  Mean Cell Hemoglobin Concentration : 31.9 gm/dL  Auto Neutrophil # : 20.09 K/uL  Auto Lymphocyte # : 1.03 K/uL  Auto Monocyte # : 0.73 K/uL  Auto Eosinophil # : 0.01 K/uL  Auto Basophil # : 0.04 K/uL  Auto Neutrophil % : 90.8 %  Auto Lymphocyte % : 4.7 %  Auto Monocyte % : 3.3 %  Auto Eosinophil % : 0.0 %  Auto Basophil % : 0.2 %    03-21    138  |  100  |  9.0  ----------------------------<  89  3.4<L>   |  29.0  |  0.57    Ca    6.8<L>      21 Mar 2020 07:07  Phos  2.3     03-21  Mg     2.1     03-21    TPro  5.1<L>  /  Alb  1.8<L>  /  TBili  0.5  /  DBili  x   /  AST  25  /  ALT  11  /  AlkPhos  103  03-19        Culture Results:   GI PCR Results: NOT detected  *******Please Note:*******  GI panel PCR evaluates for:  Campylobacter, Plesiomonas shigelloides, Salmonella,  Vibrio, Yersinia enterocolitica, Enteroaggregative  Escherichia coli (EAEC), Enteropathogenic E.coli (EPEC),  Enterotoxigenic E. coli (ETEC) lt/st, Shiga-like  toxin-producing E. coli (STEC) stx1/stx2,  Shigella/ Enteroinvasive E. coli (EIEC), Cryptosporidium,  Cyclospora cayetanensis, Entamoeba histolytica,  Giardia lamblia, Adenovirus F 40/41, Astrovirus,  Norovirus GI/GII, Rotavirus A, Sapovirus (03-19 @ 09:42)  Culture Results:   <10,000 CFU/mL Normal Urogenital Shyanne (03-19 @ 03:12)  Culture Results:   No growth at 48 hours (03-18 @ 18:30)  Culture Results:   No growth at 48 hours (03-18 @ 18:29)    ____________________________________________________________________________________________________

## 2020-03-21 NOTE — PROGRESS NOTE ADULT - SUBJECTIVE AND OBJECTIVE BOX
CHIEF COMPLAINT/INTERVAL HISTORY:    Patient is a 85y old  Female who presents with a chief complaint of Septic shock (21 Mar 2020 11:05)    SUBJECTIVE & OBJECTIVE: Pt seen and examined at bedside. Downgraded to the hospitalist service today.    ROS:     ICU Vital Signs Last 24 Hrs  T(C): 36.3 (21 Mar 2020 12:00), Max: 36.4 (20 Mar 2020 16:00)  T(F): 97.4 (21 Mar 2020 12:00), Max: 97.5 (20 Mar 2020 16:00)  HR: 70 (21 Mar 2020 14:00) (70 - 80)  BP: 111/56 (21 Mar 2020 14:00) (93/50 - 111/56)  BP(mean): 79 (21 Mar 2020 14:00) (68 - 79)  ABP: 120/55 (21 Mar 2020 10:00) (108/44 - 133/53)  ABP(mean): 78 (21 Mar 2020 10:00) (64 - 79)  RR: 23 (21 Mar 2020 14:00) (18 - 95)  SpO2: 100% (21 Mar 2020 14:00) (97% - 100%)      MEDICATIONS  (STANDING):  aMIOdarone    Tablet 200 milliGRAM(s) Oral daily  aspirin  chewable 81 milliGRAM(s) Oral daily  atorvastatin 20 milliGRAM(s) Oral at bedtime  chlorhexidine 2% Cloths 1 Application(s) Topical <User Schedule>  clopidogrel Tablet 75 milliGRAM(s) Oral daily  digoxin     Tablet 0.125 milliGRAM(s) Oral daily  heparin  Injectable 5000 Unit(s) SubCutaneous every 8 hours  hydrocortisone sodium succinate Injectable 100 milliGRAM(s) IV Push every 8 hours  influenza   Vaccine 0.5 milliLiter(s) IntraMuscular once  levETIRAcetam 500 milliGRAM(s) Oral two times a day  levothyroxine 50 MICROGram(s) Oral daily  midodrine 20 milliGRAM(s) Oral every 8 hours  piperacillin/tazobactam IVPB.. 3.375 Gram(s) IV Intermittent every 8 hours  vancomycin  IVPB 750 milliGRAM(s) IV Intermittent <User Schedule>    MEDICATIONS  (PRN):      LABS:                        9.0    22.13 )-----------( 243      ( 21 Mar 2020 07:07 )             28.2     03-21    138  |  100  |  9.0  ----------------------------<  89  3.4<L>   |  29.0  |  0.57    Ca    6.8<L>      21 Mar 2020 07:07  Phos  2.3     03-21  Mg     2.1     03-21    PHYSICAL EXAM:    GENERAL: NAD, well-groomed, well-developed  HEAD:  Atraumatic, Normocephalic  EYES: EOMI, PERRLA, conjunctiva and sclera clear  ENMT: Moist mucous membranes  NECK: Supple, No JVD  NERVOUS SYSTEM:  Alert & Oriented X3, Motor Strength 5/5 B/L upper and lower extremities; DTRs 2+ intact and symmetric  CHEST/LUNG: Clear to auscultation bilaterally; No rales, rhonchi, wheezing, or rubs  HEART: Regular rate and rhythm; No murmurs, rubs, or gallops  ABDOMEN: Soft, Nontender, Nondistended; Bowel sounds present  EXTREMITIES:  2+ Peripheral Pulses, No clubbing, cyanosis, or edema CHIEF COMPLAINT/INTERVAL HISTORY:    Patient is a 85y old  Female who presents with a chief complaint of Septic shock (21 Mar 2020 11:05)    SUBJECTIVE & OBJECTIVE: Pt seen and examined at bedside. Downgraded to the hospitalist service today. Patient denies any active complaints. BP stable; weaned off IV pressors this morning. On hydrocortisone and midodrine.    ROS: Limited due to lethargy; denies chest pain or SOB.    ICU Vital Signs Last 24 Hrs  T(C): 36.3 (21 Mar 2020 12:00), Max: 36.4 (20 Mar 2020 16:00)  T(F): 97.4 (21 Mar 2020 12:00), Max: 97.5 (20 Mar 2020 16:00)  HR: 70 (21 Mar 2020 14:00) (70 - 80)  BP: 111/56 (21 Mar 2020 14:00) (93/50 - 111/56)  BP(mean): 79 (21 Mar 2020 14:00) (68 - 79)  ABP: 120/55 (21 Mar 2020 10:00) (108/44 - 133/53)  ABP(mean): 78 (21 Mar 2020 10:00) (64 - 79)  RR: 23 (21 Mar 2020 14:00) (18 - 95)  SpO2: 100% (21 Mar 2020 14:00) (97% - 100%)      MEDICATIONS  (STANDING):  aMIOdarone    Tablet 200 milliGRAM(s) Oral daily  aspirin  chewable 81 milliGRAM(s) Oral daily  atorvastatin 20 milliGRAM(s) Oral at bedtime  chlorhexidine 2% Cloths 1 Application(s) Topical <User Schedule>  clopidogrel Tablet 75 milliGRAM(s) Oral daily  digoxin     Tablet 0.125 milliGRAM(s) Oral daily  heparin  Injectable 5000 Unit(s) SubCutaneous every 8 hours  hydrocortisone sodium succinate Injectable 100 milliGRAM(s) IV Push every 8 hours  influenza   Vaccine 0.5 milliLiter(s) IntraMuscular once  levETIRAcetam 500 milliGRAM(s) Oral two times a day  levothyroxine 50 MICROGram(s) Oral daily  midodrine 20 milliGRAM(s) Oral every 8 hours  piperacillin/tazobactam IVPB.. 3.375 Gram(s) IV Intermittent every 8 hours  vancomycin  IVPB 750 milliGRAM(s) IV Intermittent <User Schedule>    MEDICATIONS  (PRN):      LABS:                        9.0    22.13 )-----------( 243      ( 21 Mar 2020 07:07 )             28.2     03-21    138  |  100  |  9.0  ----------------------------<  89  3.4<L>   |  29.0  |  0.57    Ca    6.8<L>      21 Mar 2020 07:07  Phos  2.3     03-21  Mg     2.1     03-21    PHYSICAL EXAM:    GENERAL: elderly female, chronically ill appearing, not in acute distress  HEAD:  Atraumatic, Normocephalic  EYES: EOMI, PERRLA, conjunctiva and sclera clear  ENMT: Moist mucous membranes  NECK: Supple   CHEST/LUNG: coarse breath sounds  HEART: Regular rate and rhythm; + S1/S2  ABDOMEN: Soft, Nontender, obese; Bowel sounds present  EXTREMITIES:  ++ LE edema

## 2020-03-21 NOTE — PROCEDURE NOTE - NSPROCDETAILS_GEN_ALL_CORE
secured in place/sterile technique, catheter placed/blood seen on insertion/dressing applied/flushes easily/ultrasound utilization/location identified, draped/prepped, sterile technique used
sutured in place/location identified, draped/prepped, sterile technique used, needle inserted/introduced/connected to a pressurized flush line/hemostasis with direct pressure, dressing applied/Seldinger technique/positive blood return obtained via catheter/all materials/supplies accounted for at end of procedure

## 2020-03-22 NOTE — PROGRESS NOTE ADULT - ASSESSMENT
86 yo female with HTN, HLD, CAD, AF not on AC, CHFpEF, AS s/p TAVR, PPM, seizures, COPD, ILD, hypothyroid, presented from nursing home on 3/18 with several days of diarrhea, found to be hypotensive in ED requiring pressors. Found to have proctitis on CT abdomen. Covered empirically with abx, cultures negative to date. GI PCR negative.      Septic Shock secondary to Proctocolitis  - WBC slowly trending down; also on steroids  - CT abdomen reviewed  - procal noted  - continue vanco and zosyn  - GI PCR negative  - blood cultures no growth to date  - taper steroids   - ID consult to help with management and duration of abx    CAD  -asymptomatic   -continue asa, plavix, statin    Afib s/p PPM  - continue digoxin and amiodarone  - not a candidate for AC    HTN  -hold anti-HTN due to shock  -BP stable on midodrine and steroids (taper steroids as tolerated)    HFpEF  -appears hypervolemic but IVF stopped  -no respiratory decompensation  -will hold off on lasix today due to marginal BP but consider starting once BP is stable  -strict I/Os, daily weights    AS s/p TAVR  -outpatient cardio follow up    COPD/ILD  -continue supplemental O2, bronchodilators as needed  -on IV steroids as above; not bronchospastic  -CT chest with pulmonary fibrosis  -outpatient pulm follow up    Seizure Disorder  -continue keppra  -seizure precautions    Hypothyroidism  -TSH WNL  -continue synthroid    Anemia, likely anemia of chronic disease  -Hb relatively stable  -on DAPT and dvt ppx  -iron studies reviewed  -transfuse if Hb< 7    DVT ppx -Heparin SC    Dispo - PT evaluation; will discuss consideration of hospice services with patient's family.      Attending Attestation:   Plan discussed with patient, RN, will update family 84 yo female with HTN, HLD, CAD, AF not on AC, CHFpEF, AS s/p TAVR, PPM, seizures, COPD, ILD, hypothyroid, presented from nursing home on 3/18 with several days of diarrhea, found to be hypotensive in ED requiring pressors. Found to have proctitis on CT abdomen. Covered empirically with abx, cultures negative to date. GI PCR negative.      Septic Shock secondary to Proctocolitis  - WBC slowly trending down; also on steroids  - CT abdomen reviewed  - procal noted  - continue vanco and zosyn  - GI PCR negative  - blood cultures no growth to date  - taper steroids   - ID consult to help with management and duration of abx    CAD  -asymptomatic   -continue asa, plavix, statin    Afib s/p PPM  - continue digoxin and amiodarone  - not a candidate for AC    HTN  -hold anti-HTN due to shock  -BP stable on midodrine and steroids (taper steroids as tolerated)    HFpEF  -appears hypervolemic but IVF stopped  -no respiratory decompensation  -will hold off on lasix today due to marginal BP but consider starting once BP is stable  -strict I/Os, daily weights    AS s/p TAVR  -outpatient cardio follow up    COPD/ILD  -continue supplemental O2, bronchodilators as needed  -on IV steroids as above; not bronchospastic  -CT chest with pulmonary fibrosis  -outpatient pulm follow up    Seizure Disorder  -continue keppra  -seizure precautions    Hypothyroidism  -TSH WNL  -continue synthroid    Anemia, likely anemia of chronic disease  -Hb relatively stable  -on DAPT and dvt ppx  -iron studies reviewed  -transfuse if Hb< 7    Hypokalemia/Hypophosphatemia  -replete    DVT ppx -Heparin SC    Dispo - PT evaluation; will discuss consideration of hospice services with patient's family.      Attending Attestation:   Plan discussed with patient, RN, will update family

## 2020-03-22 NOTE — CONSULT NOTE ADULT - ASSESSMENT
86y/o  Female with h/o HTN, HLD, CAD (med management with ASA/Plavix), AF (no a/c), dCHF, AS (s/p TAVR), PPM, seizure disorder, COPD, pulmonary fibrosis, hypothyroid. Patient presents to ED with c/o 3 days of recurrent diarrhea. Patient noted to be hypotensive in ED despite volume resuscitation and was subsequently started on IV vasopressor therapy.  CT A/P reveals proctitis. SHIRLEY. She improved in MICU and was downgraded to the medical floor. Currently on IV Vancomycin and Zosyn.      Proctocolitis   sepsis due to above  Leukocytosis      - Blood cultures no growth   - GI PCR negative   - CT A/P with Proctocolitis   - UA nopt suggestive of UTI  - Procalcitonin level 0.15 (improved)  - Leukocytosis improved   - D/C Zosyn  - D/C Vancomycin  - Start Ceftriaxone 1gm IV q 24hours  - Last day of antibiotics 3/25/20  - Trend Fever  - Trend Leukocytosis      Will sign off. Please call PRN.

## 2020-03-22 NOTE — CONSULT NOTE ADULT - SUBJECTIVE AND OBJECTIVE BOX
[facility]  INFECTIOUS DISEASES AND INTERNAL MEDICINE at [place]  =======================================================  Mason Goodman MD  Diplomates American Board of Internal Medicine and Infectious Diseases  Tel: [phone]      Fax: [phone]  =======================================================      [facility]-[id number]  VERENICE HUDSON     History obtained from the chart. Patient not able to provide history.     CC: Patient is a 85y old  Female who presents with a chief complaint of Septic shock (22 Mar 2020 13:42)    84y/o  Female with h/o HTN, HLD, CAD (med management with ASA/Plavix), AF (no a/c), dCHF, AS (s/p TAVR), PPM, seizure disorder, COPD, pulmonary fibrosis, hypothyroid. Patient presents to ED with c/o 3 days of recurrent diarrhea. Patient noted to be hypotensive in ED despite volume resuscitation and was subsequently started on IV vasopressor therapy.  CT A/P reveals proctitis. SHIRLEY. She improved in MICU and was downgraded to the medical floor. Currently on IV Vancomycin and Zosyn. ID input requested.       Past Medical & Surgical Hx:  CAD (coronary artery disease)  Gait abnormality  HTN (hypertension)  Seizure  Atrial fibrillation  COPD (chronic obstructive pulmonary disease): 2L continuous O2  Hypothyroidism  Heart failure  CHF exacerbation  PAF (paroxysmal atrial fibrillation)  Skin cancer  Hyperlipidemia  Aortic stenosis  Bilateral cataracts  S/P TAVR (transcatheter aortic valve replacement)  Pacemaker      Social Hx:  Unable to obtain due to medical condition       FAMILY HISTORY:  Unable to obtain due to medical condition       Allergies  No Known Allergies      Antibiotics:  piperacillin/tazobactam IVPB.. 3.375 Gram(s) IV Intermittent every 8 hours  vancomycin  IVPB 750 milliGRAM(s) IV Intermittent <User Schedule>       REVIEW OF SYSTEMS:  Unable to obtain due to medical condition       Physical Exam:  Vital Signs Last 24 Hrs  T(C): 36.1 (22 Mar 2020 00:00), Max: 36.1 (22 Mar 2020 00:00)  T(F): 97 (22 Mar 2020 00:00), Max: 97 (22 Mar 2020 00:00)  HR: 70 (22 Mar 2020 08:00) (70 - 70)  BP: 136/73 (22 Mar 2020 14:40) (95/61 - 136/73)  RR: 16 (22 Mar 2020 08:00) (16 - 21)  SpO2: 95% (22 Mar 2020 08:00) (95% - 100%)      GEN: NAD  HEENT: normocephalic and atraumatic.  Anicteric  NECK: Supple.   LUNGS: decreased BS B/L  HEART: Regular rate and rhythm   ABDOMEN: Soft, nontender, and nondistended.  Positive bowel sounds.    : No CVA tenderness  EXTREMITIES: Without any edema.  MSK: No joint swelling  NEUROLOGIC:  Wakes to stimuli   PSYCHIATRIC: unable to assess   SKIN: No Rash      Labs:  03-22    138  |  98  |  8.0  ----------------------------<  95  3.0<L>   |  27.0  |  0.63    Ca    7.8<L>      22 Mar 2020 07:53  Phos  2.0     03-22  Mg     2.3     03-22                         9.2    14.43 )-----------( 251      ( 22 Mar 2020 07:53 )             28.9       Procalcitonin, Serum: 0.15 ng/mL (03-22-20 @ 07:53)  Procalcitonin, Serum: 0.16 ng/mL (03-21-20 @ 21:23)  Procalcitonin, Serum: 0.21 ng/mL (03-21-20 @ 14:14)  Procalcitonin, Serum: 0.36 ng/mL (03-20-20 @ 06:26)      RECENT CULTURES:  03-19 @ 09:42 .Stool     GI PCR Results: NOT detected  *******Please Note:*******  GI panel PCR evaluates for:  Campylobacter, Plesiomonas shigelloides, Salmonella,  Vibrio, Yersinia enterocolitica, Enteroaggregative  Escherichia coli (EAEC), Enteropathogenic E.coli (EPEC),  Enterotoxigenic E. coli (ETEC) lt/st, Shiga-like  toxin-producing E. coli (STEC) stx1/stx2,  Shigella/ Enteroinvasive E. coli (EIEC), Cryptosporidium,  Cyclospora cayetanensis, Entamoeba histolytica,  Giardia lamblia, Adenovirus F 40/41, Astrovirus,  Norovirus GI/GII, Rotavirus A, Sapovirus      03-19 @ 03:12 .Urine     <10,000 CFU/mL Normal Urogenital Shyanne      03-18 @ 18:30 .Blood     No growth at 48 hours      03-18 @ 18:29 .Blood     No growth at 48 hours          < from: CT Abdomen and Pelvis No Cont (03.18.20 @ 19:40) >  EXAM:  CT ABDOMEN AND PELVIS                          PROCEDURE DATE:  03/18/2020      INTERPRETATION:  CLINICAL INFORMATION: Diarrhea     COMPARISON: 11/5/2015    PROCEDURE:   CT of the Abdomen and Pelvis was performed without intravenous contrast.   Intravenous contrast: None.  Oral contrast: None.  Sagittal and coronal reformats were performed.  The patient could not raise the arms above the head for the study    FINDINGS:    LOWER CHEST: TAVR is noted. Pacemaker leads are appreciated as well. There is mitral annular calcification and coronary artery calcification. There is a small hiatal hernia    LIVER: Within normal limits.  BILE DUCTS: Normal caliber.  GALLBLADDER: Cholelithiasis and possible sludge.  SPLEEN: Within normal limits.  PANCREAS: Atrophic.  ADRENALS: Within normal limits.  KIDNEYS/URETERS: Within normal limits.    BLADDER: Within normal limits.  REPRODUCTIVE ORGANS: Uterus and adnexa within normal limits. A pessary is also seen    BOWEL: No bowel obstruction. Appendix is normal.. Note is made of bowel interposed between the liver and the diaphragm. There is significant rectal wall thickening and stranding in the perirectal fat suggestive of proctitis. There is diverticulosis without diverticulitis.  PERITONEUM: No ascites.  VESSELS: Atherosclerotic changes.  RETROPERITONEUM/LYMPH NODES: No lymphadenopathy.    ABDOMINAL WALL: Within normal limits.  BONES: Degenerative changes.    IMPRESSION:     Findings consistent with proctitis    < end of copied text >

## 2020-03-22 NOTE — PROGRESS NOTE ADULT - SUBJECTIVE AND OBJECTIVE BOX
CHIEF COMPLAINT/INTERVAL HISTORY:    Patient is a 85y old  Female who presents with a chief complaint of Septic shock (21 Mar 2020 14:30)    SUBJECTIVE & OBJECTIVE: Pt seen and examined at bedside. No overnight events. Patient elderly; unable to provide any specific complaints.    ROS: Limited; sleepy    ICU Vital Signs Last 24 Hrs  T(C): 36.1 (22 Mar 2020 00:00), Max: 36.1 (22 Mar 2020 00:00)  T(F): 97 (22 Mar 2020 00:00), Max: 97 (22 Mar 2020 00:00)  HR: 70 (22 Mar 2020 08:00) (70 - 70)  BP: 110/63 (22 Mar 2020 08:00) (95/61 - 112/65)  BP(mean): 79 (21 Mar 2020 14:00) (79 - 79)  RR: 16 (22 Mar 2020 08:00) (16 - 23)  SpO2: 95% (22 Mar 2020 08:00) (95% - 100%)    MEDICATIONS  (STANDING):  aMIOdarone    Tablet 200 milliGRAM(s) Oral daily  aspirin  chewable 81 milliGRAM(s) Oral daily  atorvastatin 20 milliGRAM(s) Oral at bedtime  chlorhexidine 2% Cloths 1 Application(s) Topical <User Schedule>  clopidogrel Tablet 75 milliGRAM(s) Oral daily  digoxin     Tablet 0.125 milliGRAM(s) Oral daily  heparin  Injectable 5000 Unit(s) SubCutaneous every 8 hours  hydrocortisone sodium succinate Injectable 50 milliGRAM(s) IV Push every 8 hours  influenza   Vaccine 0.5 milliLiter(s) IntraMuscular once  levETIRAcetam 500 milliGRAM(s) Oral two times a day  levothyroxine 50 MICROGram(s) Oral daily  midodrine 20 milliGRAM(s) Oral every 8 hours  piperacillin/tazobactam IVPB.. 3.375 Gram(s) IV Intermittent every 8 hours  potassium phosphate IVPB 30 milliMole(s) IV Intermittent once  vancomycin  IVPB 750 milliGRAM(s) IV Intermittent <User Schedule>    MEDICATIONS  (PRN):      LABS:                        9.2    14.43 )-----------( 251      ( 22 Mar 2020 07:53 )             28.9     03-22    138  |  98  |  8.0  ----------------------------<  95  3.0<L>   |  27.0  |  0.63    Ca    7.8<L>      22 Mar 2020 07:53  Phos  2.0     03-22  Mg     2.3     03-22    PHYSICAL EXAM:    GENERAL: elderly female, chronically ill appearing, not in acute distress  HEAD:  Atraumatic, Normocephalic  EYES: EOMI, PERRLA, conjunctiva and sclera clear  ENMT: Moist mucous membranes  NECK: Supple   CHEST/LUNG: coarse breath sounds  HEART: Regular rate and rhythm; + S1/S2  ABDOMEN: Soft, Nontender, obese; Bowel sounds present  EXTREMITIES:  ++ LE edema

## 2020-03-22 NOTE — PROGRESS NOTE ADULT - REASON FOR ADMISSION
Septic shock
Attending Attestation (For Attendings USE Only)...

## 2020-03-23 NOTE — DISCHARGE NOTE PROVIDER - NSDCCPCAREPLAN_GEN_ALL_CORE_FT
PRINCIPAL DISCHARGE DIAGNOSIS  Diagnosis: Hypotension, unspecified hypotension type  Assessment and Plan of Treatment: Improving, continue Midodrine for now and taper down as able. Continue steroid taper. Hold Metoprolol and Lasix for now. Can reintroduce as able. .Instructions for follow-up, activity and diet: It is important to see your primary physician as well as the physicians noted below within the next week to perform a comprehensive medical review.  Call their offices for an appointment as soon as you leave the hospital.  If you do not have a primary physician, contact the [facility] [place] [phone] located on [address] [place] [address].  Your medical issues appear to be stable at this time, but if your symptoms recur or worsen, contact your physicians and/or return to the hospital if necessary.  If you encounter any issues or questions with your medication, call your physicians before stopping the medication.  Do not drive.  Limit your diet to 2 grams of sodium daily.      SECONDARY DISCHARGE DIAGNOSES  Diagnosis: Heart failure  Assessment and Plan of Treatment: Lasix and Metoprolol being held for now secondary to hypotension. Pt euvolemic, can restart as able as BP improves.    Diagnosis: Hypokalemia  Assessment and Plan of Treatment: Repleted, plesae follow up repeat labs and replete as needed.    Diagnosis: Proctocolitis  Assessment and Plan of Treatment: Diarrhea resolved. Continue antibiotics via peripheral IV through 3/25/20. Follow up PMD within 2 days.    Diagnosis: Septic shock  Assessment and Plan of Treatment: Secondary to above, marlin resolved, plan as above.

## 2020-03-23 NOTE — DISCHARGE NOTE PROVIDER - NSDCFUSCHEDAPPT_GEN_ALL_CORE_FT
VERENICE HUDSON T ; 05/08/2020 ; [facility] [address] [facility]  VERENICE HUDSON T ; 05/08/2020 ; [facility] [address] [facility] VERENICE HUDSON T ; 05/08/2020 ; [facility] [address] [facility]  VERENICE HUDSON T ; 05/08/2020 ; [facility] [address] [facility]

## 2020-03-23 NOTE — DISCHARGE NOTE NURSING/CASE MANAGEMENT/SOCIAL WORK - PATIENT PORTAL LINK FT
You can access the FollowMyHealth Patient Portal offered by [facility] by registering at the following website: [email or web address]. By joining [facility]’s FollowMyHealth portal, you will also be able to view your health information using other applications (apps) compatible with our system.

## 2020-03-23 NOTE — DISCHARGE NOTE PROVIDER - HOSPITAL COURSE
86 yo female with HTN, HLD, CAD, AF not on AC, CHFpEF, AS s/p TAVR, PPM, seizures, COPD, ILD, hypothyroid, presented from nursing home on 3/18 with several days of diarrhea, found to be hypotensive in ED requiring pressors. Found to have proctitis on CT abdomen. Covered empirically with abx, cultures negative to date. GI PCR negative.      Septic Shock has now resolved, s/p Vanco/Zosyn and per ID will continue Ceftriaxone through 3/25/20. WBC trending down, afebrile, blood cultures negative. Pt was on hydrocortisone and will now be discharged on steroid taper. BP remains stable on Midodrine and steroids, on softer side. Will continue to hold Lasix and Metoprolol, pt remains on Midodrine which can be tapered down in Nursing Home. For CAD pt will continue asa, plavix, statin. For Afib s/p PPM will continue digoxin and amiodarone, not a candidate for AC.     For HFpEF lasix was held due to hypotension and pt has no respiratory decompensation, will hold off on restarting lasix due to marginal BP, can consider starting once BP improves, will outline in discharge summary for Nursing Home. For AS s/p TAVR pt will follow up with cardiologist as outpatient. For COPD/ILD, will continue supplemental O2, bronchodilators as needed, cont steroid taper. CT chest shows pulmonary fibrosis, will follow up pulmonology as outpatient. Pt will continue Keppra as directed for seizure disorder. Juan hypothyroidism will cont synthroid, TSH is wnl. Pt has anemia, likely AOCD, Hb remaining stable on DAPT. Pt had some hypokalemia and hypophosphatemia, now repleted.        Vital Signs Last 24 Hrs    T(C): 36.7 (23 Mar 2020 08:21), Max: 36.7 (23 Mar 2020 08:21)    T(F): 98 (23 Mar 2020 08:21), Max: 98 (23 Mar 2020 08:21)    HR: 69 (23 Mar 2020 08:21) (69 - 89)    BP: 104/57 (23 Mar 2020 08:21) (104/57 - 137/73)    BP(mean): --    RR: 18 (23 Mar 2020 08:21) (17 - 18)    SpO2: 93% (23 Mar 2020 08:21) (93% - 100%)        General: NAD, sitting up in bed, well groomed in nightgown    HEENT: NC/AT    Neck: Supple and symmetrical, no JVD    CV: RRR, no m/r/g    Lungs: CTA b/l, no use of accessory muscles, no wheezes, rales, rhonchi    Skin: warm, dry, no rashes    Psych: normal mood/affect    Abdomen: Normal BS, soft, NT/ND    Extremities: no edema, cyanosis    Musculoskeletal: No obvious deformities    Neuro: A & O X 3, CN 2-12 grossly intact, no sensory or motor deficit.             40 minutes spent on discharge summary and plan.        Pt stable for discharge to NH. 86 yo female with HTN, HLD, CAD, AF not on AC, CHFpEF, AS s/p TAVR, PPM, seizures, COPD, ILD, hypothyroid, presented from nursing home on 3/18 with several days of diarrhea, found to be hypotensive in ED requiring pressors. Found to have proctitis on CT abdomen. Covered empirically with abx, cultures negative to date. GI PCR negative.      Septic Shock has now resolved, s/p Vanco/Zosyn and per ID will continue Ceftriaxone through 3/25/20. WBC trending down, afebrile, blood cultures negative. Pt was on hydrocortisone and will now be discharged on steroid taper. BP remains stable on Midodrine and steroids, on softer side. Will continue to hold Lasix and Metoprolol, pt remains on Midodrine which can be tapered down in Nursing Home. For CAD pt will continue asa, plavix, statin. For Afib s/p PPM will continue digoxin and amiodarone, not a candidate for AC.     For HFpEF lasix was held due to hypotension and pt has no respiratory decompensation, will hold off on restarting lasix due to marginal BP, can consider starting once BP improves, will outline in discharge summary for Nursing Home. For AS s/p TAVR pt will follow up with cardiologist as outpatient. For COPD/ILD, will continue supplemental O2, bronchodilators as needed, cont steroid taper. CT chest shows pulmonary fibrosis, will follow up pulmonology as outpatient. Pt will continue Keppra as directed for seizure disorder. Juan hypothyroidism will cont synthroid, TSH is wnl. Pt has anemia, likely AOCD, Hb remaining stable on DAPT. Pt had some hypokalemia and hypophosphatemia, now repleted.        Vital Signs Last 24 Hrs    T(C): 36.7 (23 Mar 2020 08:21), Max: 36.7 (23 Mar 2020 08:21)    T(F): 98 (23 Mar 2020 08:21), Max: 98 (23 Mar 2020 08:21)    HR: 69 (23 Mar 2020 08:21) (69 - 89)    BP: 104/57 (23 Mar 2020 08:21) (104/57 - 137/73)    BP(mean): --    RR: 18 (23 Mar 2020 08:21) (17 - 18)    SpO2: 93% (23 Mar 2020 08:21) (93% - 100%)        General: NAD, sitting up in bed, well groomed in nightgown    HEENT: NC/AT    Neck: Supple and symmetrical, no JVD    CV: RRR, no m/r/g    Lungs: CTA b/l, no use of accessory muscles, no wheezes, rales, rhonchi    Skin: warm, dry, no rashes    Psych: normal mood/affect    Abdomen: Normal BS, soft, NT/ND    Extremities: no edema, cyanosis    Musculoskeletal: No obvious deformities    Neuro: A & O X 3, CN 2-12 grossly intact, no sensory or motor deficit.         40 minutes spent on discharge summary and plan.        Medically stable for discharge to rehab. Time spent on discharge 45 minutes.

## 2020-05-08 ENCOUNTER — APPOINTMENT (OUTPATIENT)
Dept: CARDIOLOGY | Facility: CLINIC | Age: 85
End: 2020-05-08

## 2021-06-10 NOTE — SEPSIS NOTE - AS O2 DELIVERY
supplemental O2 Kilograms Preamble Statement (Weight Entered In Details Tab): Reported Weight in kilograms:

## 2024-03-28 NOTE — PROGRESS NOTE ADULT - MINUTES
[de-identified] : Date of initial evaluation 03/28/2024 Patient age is 63 years old Occupation is gastroenterologist  Body part causing symptoms is the left shoulder Symptoms began 6 months ago, NKI  Location of pain is anterior  Quality of pain is sharp  Pain score at rest is 1 Pain score during activity is 7 Radicular symptoms are not present Prior treatments include Home PT  Patient's condition is not associated with workers compensation, no-fault or interscholastic athletics 35

## 2024-12-08 NOTE — PATIENT PROFILE ADULT - BRADEN ACTIVITY
Case: [id number] Date/Time: 12/07/24 1810    Procedure: ORIF, FRACTURE, FEMUR (Right) - distal femur fracture, flat alisha table, xray, carolyn system in house    Location:  OR 02 / SURGERY [facility]    Surgeons: Hi Garzon M.D.            Relevant Problems   NEURO   (positive) Ocular migraine      CARDIAC   (positive) Aortic atherosclerosis (HCC)   (positive) Coronary atherosclerosis due to calcified coronary lesion   (positive) Essential hypertension   (positive) Hypertensive urgency   (positive) Ocular migraine   (positive) Other forms of angina pectoris (HCC)   (positive) Paroxysmal SVT (supraventricular tachycardia) (HCC)   (positive) Paroxysmal atrial fibrillation (HCC)      GI   (positive) Gastroesophageal reflux disease without esophagitis         (positive) Fibrosis of liver       Physical Exam    Airway   Mallampati: II  TM distance: >3 FB  Neck ROM: full       Cardiovascular - normal exam  Rhythm: regular  Rate: normal  (-) murmur     Dental - normal exam           Pulmonary - normal exam  Breath sounds clear to auscultation     Abdominal    Neurological - normal exam                   Anesthesia Plan    ASA 2       Plan - general       Airway plan will be ETT          Induction: intravenous    Postoperative Plan: Postoperative administration of opioids is intended.    Pertinent diagnostic labs and testing reviewed    Informed Consent:    Anesthetic plan and risks discussed with patient.    Use of blood products discussed with: patient whom consented to blood products.            (3) walks occasionally